# Patient Record
Sex: FEMALE | Race: BLACK OR AFRICAN AMERICAN | Employment: OTHER | URBAN - METROPOLITAN AREA
[De-identification: names, ages, dates, MRNs, and addresses within clinical notes are randomized per-mention and may not be internally consistent; named-entity substitution may affect disease eponyms.]

---

## 2017-02-27 ENCOUNTER — HOSPITAL ENCOUNTER (OUTPATIENT)
Dept: WOMENS IMAGING | Age: 82
Discharge: HOME | End: 2017-02-27
Payer: MEDICARE

## 2017-02-27 DIAGNOSIS — Z13.9 SCREENING: ICD-10-CM

## 2017-02-27 PROCEDURE — G0202 SCR MAMMO BI INCL CAD: HCPCS

## 2017-03-07 ENCOUNTER — HOSPITAL ENCOUNTER (OUTPATIENT)
Dept: CARDIOLOGY | Age: 82
Discharge: HOME | End: 2017-03-07
Payer: MEDICARE

## 2017-03-07 PROCEDURE — 93293 PM PHONE R-STRIP DEVICE EVAL: CPT

## 2017-06-06 ENCOUNTER — HOSPITAL ENCOUNTER (OUTPATIENT)
Dept: CARDIOLOGY | Age: 82
Discharge: HOME | End: 2017-06-06
Payer: MEDICARE

## 2017-06-06 PROCEDURE — 93293 PM PHONE R-STRIP DEVICE EVAL: CPT

## 2017-06-08 ENCOUNTER — OFFICE VISIT (OUTPATIENT)
Dept: CARDIOLOGY | Age: 82
End: 2017-06-08

## 2017-06-08 VITALS
SYSTOLIC BLOOD PRESSURE: 154 MMHG | HEART RATE: 60 BPM | HEIGHT: 62 IN | WEIGHT: 136.5 LBS | BODY MASS INDEX: 25.12 KG/M2 | DIASTOLIC BLOOD PRESSURE: 72 MMHG | TEMPERATURE: 98.7 F | RESPIRATION RATE: 18 BRPM | OXYGEN SATURATION: 98 %

## 2017-06-08 DIAGNOSIS — E78.00 PURE HYPERCHOLESTEROLEMIA: ICD-10-CM

## 2017-06-08 DIAGNOSIS — E03.9 HYPOTHYROIDISM, UNSPECIFIED TYPE: ICD-10-CM

## 2017-06-08 DIAGNOSIS — I25.10 CORONARY ARTERY DISEASE INVOLVING NATIVE HEART WITHOUT ANGINA PECTORIS, UNSPECIFIED VESSEL OR LESION TYPE: ICD-10-CM

## 2017-06-08 DIAGNOSIS — Z95.5 HISTORY OF CORONARY ARTERY STENT PLACEMENT: ICD-10-CM

## 2017-06-08 DIAGNOSIS — I10 ESSENTIAL HYPERTENSION: Primary | Chronic | ICD-10-CM

## 2017-06-08 PROCEDURE — 99214 OFFICE O/P EST MOD 30 MIN: CPT | Performed by: INTERNAL MEDICINE

## 2017-06-08 PROCEDURE — 93000 ELECTROCARDIOGRAM COMPLETE: CPT | Performed by: INTERNAL MEDICINE

## 2017-08-07 ENCOUNTER — HOSPITAL ENCOUNTER (INPATIENT)
Age: 82
LOS: 1 days | Discharge: HOME | DRG: 259 | End: 2017-08-09
Attending: INTERNAL MEDICINE | Admitting: INTERNAL MEDICINE
Payer: MEDICARE

## 2017-08-07 ENCOUNTER — APPOINTMENT (OUTPATIENT)
Dept: GENERAL RADIOLOGY | Age: 82
DRG: 259 | End: 2017-08-07
Payer: MEDICARE

## 2017-08-07 ENCOUNTER — APPOINTMENT (OUTPATIENT)
Dept: CT IMAGING | Age: 82
DRG: 259 | End: 2017-08-07
Payer: MEDICARE

## 2017-08-07 DIAGNOSIS — R55 SYNCOPE AND COLLAPSE: Primary | ICD-10-CM

## 2017-08-07 DIAGNOSIS — E87.1 HYPONATREMIA: ICD-10-CM

## 2017-08-07 LAB
ALBUMIN SERPL-MCNC: 3.9 G/DL (ref 3.9–4.9)
ALP BLD-CCNC: 53 U/L (ref 40–130)
ALT SERPL-CCNC: 16 U/L (ref 0–33)
ANION GAP SERPL CALCULATED.3IONS-SCNC: 12 MEQ/L (ref 7–13)
AST SERPL-CCNC: 28 U/L (ref 0–35)
BACTERIA: ABNORMAL /HPF
BASOPHILS ABSOLUTE: 0 K/UL (ref 0–0.2)
BASOPHILS RELATIVE PERCENT: 1.1 %
BILIRUB SERPL-MCNC: 0.4 MG/DL (ref 0–1.2)
BILIRUBIN URINE: NEGATIVE
BLOOD, URINE: NEGATIVE
BUN BLDV-MCNC: 30 MG/DL (ref 8–23)
CALCIUM SERPL-MCNC: 8.5 MG/DL (ref 8.6–10.2)
CHLORIDE BLD-SCNC: 94 MEQ/L (ref 98–107)
CK MB: 4.1 NG/ML (ref 0–3.8)
CLARITY: CLEAR
CO2: 25 MEQ/L (ref 22–29)
COLOR: YELLOW
CREAT SERPL-MCNC: 1.44 MG/DL (ref 0.5–0.9)
CREATINE KINASE-MB INDEX: 2 % (ref 0–3.5)
EOSINOPHILS ABSOLUTE: 0.2 K/UL (ref 0–0.7)
EOSINOPHILS RELATIVE PERCENT: 3.6 %
GFR AFRICAN AMERICAN: 41.9
GFR NON-AFRICAN AMERICAN: 34.6
GLOBULIN: 3.3 G/DL (ref 2.3–3.5)
GLUCOSE BLD-MCNC: 96 MG/DL (ref 74–109)
GLUCOSE URINE: NEGATIVE MG/DL
HCT VFR BLD CALC: 37 % (ref 37–47)
HEMOGLOBIN: 12.4 G/DL (ref 12–16)
KETONES, URINE: NEGATIVE MG/DL
LACTIC ACID: 1.2 MMOL/L (ref 0.5–2.2)
LEUKOCYTE ESTERASE, URINE: ABNORMAL
LYMPHOCYTES ABSOLUTE: 1.6 K/UL (ref 1–4.8)
LYMPHOCYTES RELATIVE PERCENT: 36.9 %
MAGNESIUM: 2.3 MG/DL (ref 1.7–2.3)
MCH RBC QN AUTO: 30.9 PG (ref 27–31.3)
MCHC RBC AUTO-ENTMCNC: 33.5 % (ref 33–37)
MCV RBC AUTO: 92.1 FL (ref 82–100)
MONOCYTES ABSOLUTE: 0.6 K/UL (ref 0.2–0.8)
MONOCYTES RELATIVE PERCENT: 15.3 %
NEUTROPHILS ABSOLUTE: 1.8 K/UL (ref 1.4–6.5)
NEUTROPHILS RELATIVE PERCENT: 43.1 %
NITRITE, URINE: NEGATIVE
PDW BLD-RTO: 15.4 % (ref 11.5–14.5)
PH UA: 7 (ref 5–9)
PLATELET # BLD: 146 K/UL (ref 130–400)
POTASSIUM SERPL-SCNC: 4.1 MEQ/L (ref 3.5–5.1)
PRO-BNP: 4337 PG/ML
PROTEIN UA: NEGATIVE MG/DL
RBC # BLD: 4.02 M/UL (ref 4.2–5.4)
RBC UA: ABNORMAL /HPF (ref 0–2)
SODIUM BLD-SCNC: 131 MEQ/L (ref 132–144)
SPECIFIC GRAVITY UA: 1.01 (ref 1–1.03)
TOTAL CK: 209 U/L (ref 0–170)
TOTAL PROTEIN: 7.2 G/DL (ref 6.4–8.1)
TROPONIN: <0.01 NG/ML (ref 0–0.01)
TROPONIN: <0.01 NG/ML (ref 0–0.01)
URINE REFLEX TO CULTURE: YES
UROBILINOGEN, URINE: 0.2 E.U./DL
WBC # BLD: 4.2 K/UL (ref 4.8–10.8)
WBC UA: ABNORMAL /HPF (ref 0–5)

## 2017-08-07 PROCEDURE — 2580000003 HC RX 258: Performed by: PHYSICIAN ASSISTANT

## 2017-08-07 PROCEDURE — 82553 CREATINE MB FRACTION: CPT

## 2017-08-07 PROCEDURE — 83605 ASSAY OF LACTIC ACID: CPT

## 2017-08-07 PROCEDURE — 83880 ASSAY OF NATRIURETIC PEPTIDE: CPT

## 2017-08-07 PROCEDURE — 70450 CT HEAD/BRAIN W/O DYE: CPT

## 2017-08-07 PROCEDURE — G0378 HOSPITAL OBSERVATION PER HR: HCPCS

## 2017-08-07 PROCEDURE — 99285 EMERGENCY DEPT VISIT HI MDM: CPT

## 2017-08-07 PROCEDURE — 85025 COMPLETE CBC W/AUTO DIFF WBC: CPT

## 2017-08-07 PROCEDURE — 87086 URINE CULTURE/COLONY COUNT: CPT

## 2017-08-07 PROCEDURE — 80053 COMPREHEN METABOLIC PANEL: CPT

## 2017-08-07 PROCEDURE — 99222 1ST HOSP IP/OBS MODERATE 55: CPT | Performed by: INTERNAL MEDICINE

## 2017-08-07 PROCEDURE — 82550 ASSAY OF CK (CPK): CPT

## 2017-08-07 PROCEDURE — 84484 ASSAY OF TROPONIN QUANT: CPT

## 2017-08-07 PROCEDURE — 71010 XR CHEST PORTABLE: CPT

## 2017-08-07 PROCEDURE — 6370000000 HC RX 637 (ALT 250 FOR IP): Performed by: PHYSICIAN ASSISTANT

## 2017-08-07 PROCEDURE — 93005 ELECTROCARDIOGRAM TRACING: CPT

## 2017-08-07 PROCEDURE — 81001 URINALYSIS AUTO W/SCOPE: CPT

## 2017-08-07 PROCEDURE — 36415 COLL VENOUS BLD VENIPUNCTURE: CPT

## 2017-08-07 PROCEDURE — 83735 ASSAY OF MAGNESIUM: CPT

## 2017-08-07 RX ORDER — SODIUM CHLORIDE 0.9 % (FLUSH) 0.9 %
10 SYRINGE (ML) INJECTION PRN
Status: DISCONTINUED | OUTPATIENT
Start: 2017-08-07 | End: 2017-08-09 | Stop reason: HOSPADM

## 2017-08-07 RX ORDER — SODIUM CHLORIDE 0.9 % (FLUSH) 0.9 %
10 SYRINGE (ML) INJECTION PRN
Status: DISCONTINUED | OUTPATIENT
Start: 2017-08-07 | End: 2017-08-07

## 2017-08-07 RX ORDER — NITROGLYCERIN 0.4 MG/1
TABLET SUBLINGUAL
Qty: 25 TABLET | Refills: 11 | Status: SHIPPED | OUTPATIENT
Start: 2017-08-07 | End: 2022-01-03 | Stop reason: SDUPTHER

## 2017-08-07 RX ORDER — CLOPIDOGREL BISULFATE 75 MG/1
75 TABLET ORAL DAILY
Status: DISCONTINUED | OUTPATIENT
Start: 2017-08-07 | End: 2017-08-09 | Stop reason: HOSPADM

## 2017-08-07 RX ORDER — LORAZEPAM 1 MG/1
1 TABLET ORAL EVERY 6 HOURS PRN
Status: DISCONTINUED | OUTPATIENT
Start: 2017-08-07 | End: 2017-08-09 | Stop reason: HOSPADM

## 2017-08-07 RX ORDER — ONDANSETRON 2 MG/ML
4 INJECTION INTRAMUSCULAR; INTRAVENOUS EVERY 6 HOURS PRN
Status: DISCONTINUED | OUTPATIENT
Start: 2017-08-07 | End: 2017-08-09 | Stop reason: HOSPADM

## 2017-08-07 RX ORDER — ONDANSETRON 2 MG/ML
4 INJECTION INTRAMUSCULAR; INTRAVENOUS EVERY 6 HOURS PRN
Status: DISCONTINUED | OUTPATIENT
Start: 2017-08-07 | End: 2017-08-07

## 2017-08-07 RX ORDER — SODIUM CHLORIDE 0.9 % (FLUSH) 0.9 %
10 SYRINGE (ML) INJECTION EVERY 12 HOURS SCHEDULED
Status: DISCONTINUED | OUTPATIENT
Start: 2017-08-07 | End: 2017-08-07

## 2017-08-07 RX ORDER — ASPIRIN 81 MG/1
81 TABLET, CHEWABLE ORAL DAILY
Status: DISCONTINUED | OUTPATIENT
Start: 2017-08-07 | End: 2017-08-07 | Stop reason: SDUPTHER

## 2017-08-07 RX ORDER — ACETAMINOPHEN 325 MG/1
650 TABLET ORAL EVERY 4 HOURS PRN
Status: DISCONTINUED | OUTPATIENT
Start: 2017-08-07 | End: 2017-08-09 | Stop reason: HOSPADM

## 2017-08-07 RX ORDER — AMLODIPINE BESYLATE 2.5 MG/1
2.5 TABLET ORAL DAILY
Status: DISCONTINUED | OUTPATIENT
Start: 2017-08-07 | End: 2017-08-09 | Stop reason: HOSPADM

## 2017-08-07 RX ORDER — CLOPIDOGREL BISULFATE 75 MG/1
75 TABLET ORAL DAILY
Status: DISCONTINUED | OUTPATIENT
Start: 2017-08-07 | End: 2017-08-07

## 2017-08-07 RX ORDER — MORPHINE SULFATE 4 MG/ML
4 INJECTION, SOLUTION INTRAMUSCULAR; INTRAVENOUS
Status: DISCONTINUED | OUTPATIENT
Start: 2017-08-07 | End: 2017-08-07

## 2017-08-07 RX ORDER — ASPIRIN 81 MG/1
81 TABLET, CHEWABLE ORAL DAILY
Status: DISCONTINUED | OUTPATIENT
Start: 2017-08-08 | End: 2017-08-09 | Stop reason: HOSPADM

## 2017-08-07 RX ORDER — MORPHINE SULFATE 4 MG/ML
2 INJECTION, SOLUTION INTRAMUSCULAR; INTRAVENOUS
Status: DISCONTINUED | OUTPATIENT
Start: 2017-08-07 | End: 2017-08-09 | Stop reason: HOSPADM

## 2017-08-07 RX ORDER — SODIUM CHLORIDE 0.9 % (FLUSH) 0.9 %
10 SYRINGE (ML) INJECTION EVERY 12 HOURS SCHEDULED
Status: DISCONTINUED | OUTPATIENT
Start: 2017-08-07 | End: 2017-08-09 | Stop reason: HOSPADM

## 2017-08-07 RX ORDER — LISINOPRIL 20 MG/1
20 TABLET ORAL DAILY
Status: DISCONTINUED | OUTPATIENT
Start: 2017-08-07 | End: 2017-08-09 | Stop reason: HOSPADM

## 2017-08-07 RX ORDER — MORPHINE SULFATE 4 MG/ML
4 INJECTION, SOLUTION INTRAMUSCULAR; INTRAVENOUS
Status: DISCONTINUED | OUTPATIENT
Start: 2017-08-07 | End: 2017-08-09 | Stop reason: HOSPADM

## 2017-08-07 RX ORDER — NITROGLYCERIN 0.4 MG/1
0.4 TABLET SUBLINGUAL EVERY 5 MIN PRN
Status: DISCONTINUED | OUTPATIENT
Start: 2017-08-07 | End: 2017-08-07

## 2017-08-07 RX ORDER — ATENOLOL 50 MG/1
50 TABLET ORAL DAILY
Status: DISCONTINUED | OUTPATIENT
Start: 2017-08-07 | End: 2017-08-09 | Stop reason: HOSPADM

## 2017-08-07 RX ORDER — ACETAMINOPHEN 325 MG/1
650 TABLET ORAL EVERY 4 HOURS PRN
Status: DISCONTINUED | OUTPATIENT
Start: 2017-08-07 | End: 2017-08-07

## 2017-08-07 RX ORDER — 0.9 % SODIUM CHLORIDE 0.9 %
1000 INTRAVENOUS SOLUTION INTRAVENOUS ONCE
Status: COMPLETED | OUTPATIENT
Start: 2017-08-07 | End: 2017-08-07

## 2017-08-07 RX ORDER — LEVOTHYROXINE SODIUM 0.05 MG/1
50 TABLET ORAL DAILY
Status: DISCONTINUED | OUTPATIENT
Start: 2017-08-07 | End: 2017-08-09 | Stop reason: HOSPADM

## 2017-08-07 RX ORDER — ISOSORBIDE MONONITRATE 30 MG/1
30 TABLET, EXTENDED RELEASE ORAL DAILY
Status: DISCONTINUED | OUTPATIENT
Start: 2017-08-07 | End: 2017-08-09 | Stop reason: HOSPADM

## 2017-08-07 RX ORDER — NITROGLYCERIN 0.4 MG/1
0.4 TABLET SUBLINGUAL EVERY 5 MIN PRN
Status: DISCONTINUED | OUTPATIENT
Start: 2017-08-07 | End: 2017-08-09 | Stop reason: HOSPADM

## 2017-08-07 RX ADMIN — LORAZEPAM 1 MG: 1 TABLET ORAL at 18:54

## 2017-08-07 RX ADMIN — ATENOLOL 50 MG: 50 TABLET ORAL at 20:06

## 2017-08-07 RX ADMIN — SODIUM CHLORIDE 1000 ML: 9 INJECTION, SOLUTION INTRAVENOUS at 15:43

## 2017-08-07 RX ADMIN — SODIUM CHLORIDE, PRESERVATIVE FREE 10 ML: 5 INJECTION INTRAVENOUS at 20:06

## 2017-08-07 ASSESSMENT — ENCOUNTER SYMPTOMS
GASTROINTESTINAL NEGATIVE: 1
APNEA: 0
STRIDOR: 0
WHEEZING: 0
BLOOD IN STOOL: 0
ALLERGIC/IMMUNOLOGIC NEGATIVE: 1
EYES NEGATIVE: 1
COUGH: 0
NAUSEA: 0
VOMITING: 0
SHORTNESS OF BREATH: 1
TROUBLE SWALLOWING: 0
SHORTNESS OF BREATH: 0
DIARRHEA: 0
CHEST TIGHTNESS: 0
COLOR CHANGE: 0
EYE PAIN: 0
RESPIRATORY NEGATIVE: 1

## 2017-08-07 NOTE — CARE COORDINATION
PAGED DAVID FIORE @ 3816 REGARDING WHETHER NEURO CHECKS WOULD BE CONSIDERED. NO RESPONSE TO PAGE AS OF 1730.

## 2017-08-07 NOTE — H&P
Negative for blood in stool and nausea. Genitourinary: Negative. Musculoskeletal: Negative. Skin: Negative. Neurological: Positive for syncope. Negative for dizziness, weakness and light-headedness. Hematological: Negative. Psychiatric/Behavioral: Negative. Physical Examination:    BP (!) 141/95  Pulse 65  Ht 5' 4\" (1.626 m)  Wt 135 lb (61.2 kg)  SpO2 97%  BMI 23.17 kg/m2   Physical Exam   Constitutional: She appears healthy. No distress. HENT:   Normal cephalic and Atraumatic   Eyes: Pupils are equal, round, and reactive to light. Neck: Normal range of motion and thyroid normal. Neck supple. No JVD present. No adenopathy. No thyromegaly present. Cardiovascular: Normal rate, regular rhythm, intact distal pulses and normal pulses. Murmur heard. Pulmonary/Chest: Effort normal and breath sounds normal. She has no wheezes. She has no rales. She exhibits no tenderness. Abdominal: Soft. Bowel sounds are normal.   Musculoskeletal: Normal range of motion. She exhibits no edema or tenderness. Neurological: She is alert and oriented to person, place, and time. Skin: Skin is warm. No cyanosis. Nails show no clubbing.          LABS:  CBC:   Lab Results   Component Value Date    WBC 4.2 08/07/2017    RBC 4.02 08/07/2017    HGB 12.4 08/07/2017    HCT 37.0 08/07/2017    MCV 92.1 08/07/2017    MCH 30.9 08/07/2017    MCHC 33.5 08/07/2017    RDW 15.4 08/07/2017     08/07/2017    MPV 9.1 07/18/2014     CBC with Differential:    Lab Results   Component Value Date    WBC 4.2 08/07/2017    RBC 4.02 08/07/2017    HGB 12.4 08/07/2017    HCT 37.0 08/07/2017     08/07/2017    MCV 92.1 08/07/2017    MCH 30.9 08/07/2017    MCHC 33.5 08/07/2017    RDW 15.4 08/07/2017    LYMPHOPCT 36.9 08/07/2017    MONOPCT 15.3 08/07/2017    BASOPCT 1.1 08/07/2017    MONOSABS 0.6 08/07/2017    LYMPHSABS 1.6 08/07/2017    EOSABS 0.2 08/07/2017    BASOSABS 0.0 08/07/2017     CMP:    Lab Results

## 2017-08-07 NOTE — IP AVS SNAPSHOT
Patient Information     Patient Name YENIFER Hopkins 1932      Care Provided at:     Name Address Phone       [address] [facility] [address] [facility]  [place] [place] [address] [phone]            Your Visit    Here you will find information about your visit, including the reason for your visit. Please take this sheet with you when you visit your doctor or other health care provider in the future. It will help determine the best possible medical care for you at that time. If you have any questions once you leave the hospital, please call the department phone number listed below. Why you were here     Your primary diagnosis was:  Not on File    Your diagnoses also included:  Syncope And Collapse      Visit Information     Date & Time Provider Department Dept. Phone    2017 Sugey Whitt MD [address] Telemetry [phone]       Follow-up Appointments    Below is a list of your follow-up and future appointments. This may not be a complete list as you may have made appointments directly with providers that we are not aware of or your providers may have made some for you. Please call your providers to confirm appointments. It is important to keep your appointments. Please bring your current insurance card, photo ID, co-pay, and all medication bottles to your appointment. If self-pay, payment is expected at the time of service. Follow-up Information     Schedule an appointment as soon as possible for a visit with Mac Kim MD.    Specialty:  Cardiology    Why:  Hospital discharge / Post pacemaker generator change , For wound check    Contact information:    [address] [facility], [address] [facility] [id number] [phone]        Future Appointments     2017 10:30 AM     Appointment with Mac Kim MD at [facility] Cardiology ([phone])   Please arrive 15 minutes prior to appointment, bring photo ID and insurance card.    [address] [facility], [address] [facility] · If your doctor recommends it, limit the amount of water you drink. And limit fluids that are mostly water. These include tea, coffee, and juice. · Take your medicines exactly as prescribed. Call your doctor if you have any problems with your medicine. · Get your sodium levels tested when your doctor tells you to. When should you call for help? Call 911 anytime you think you may need emergency care. For example, call if:  · You have a seizure. · You passed out (lost consciousness). Call your doctor now or seek immediate medical care if:  · You are confused or it is hard to focus. · You have little or no appetite. · You feel sick to your stomach or you vomit. · You have a headache. · You have mood changes. · You feel more tired than usual.  Watch closely for changes in your health, and be sure to contact your doctor if:  · You do not get better as expected. Where can you learn more? Go to https://[place].Tigermed. org and sign in to your Qiniu account. Enter U819 in the EyeIC box to learn more about \"Hyponatremia: Care Instructions. \"     If you do not have an account, please click on the \"Sign Up Now\" link. Current as of: October 14, 2016  Content Version: 11.2  © 4834-3873 [employer], [employer]. Care instructions adapted under license by [facility] ([facility]). If you have questions about a medical condition or this instruction, always ask your healthcare professional. [place] [address] any warranty or liability for your use of this information. Electrolyte Imbalance: Care Instructions  Your Care Instructions  Electrolytes are minerals in your blood. They include sodium, potassium, calcium, and magnesium. When they are not at the right levels, you can feel very ill. You may not know what is causing it, but you know something is wrong. You may feel weak or numb, have muscle spasms, or twitch.  Your of the medicines you take. How can you care for yourself at home? · Drink plenty of fluids, enough so that your urine is light yellow or clear like water. If you have kidney, heart, or liver disease and have to limit fluids, talk with your doctor before you increase the amount of fluids you drink. · Include high-fiber foods in your diet each day. These include fruits, vegetables, beans, and whole grains. · Get at least 30 minutes of exercise on most days of the week. Walking is a good choice. You also may want to do other activities, such as running, swimming, cycling, or playing tennis or team sports. · Take a fiber supplement, such as Citrucel or Metamucil, every day. Read and follow all instructions on the label. · Schedule time each day for a bowel movement. A daily routine may help. Take your time having your bowel movement. · Support your feet with a small step stool when you sit on the toilet. This helps flex your hips and places your pelvis in a squatting position. · Your doctor may recommend an over-the-counter laxative to relieve your constipation. Examples are Milk of Magnesia and MiraLax. Read and follow all instructions on the label. Do not use laxatives on a long-term basis. When should you call for help? Call your doctor now or seek immediate medical care if:  · You have new or worse belly pain. · You have new or worse nausea or vomiting. · You have blood in your stools. Watch closely for changes in your health, and be sure to contact your doctor if:  · Your constipation is getting worse. · You do not get better as expected. Where can you learn more? Go to https://bandar.WePay. org and sign in to your CLASEMOVIL account. Enter 21 [phone] in the Peoplematics box to learn more about \"Constipation: Care Instructions. \"     If you do not have an account, please click on the \"Sign Up Now\" link.   Current as of: May 27, 2016  Content Version: 11.2 activity you think might cause chest pain. Follow your doctor's instructions. Do not crush, chew, break, or open an extended-release capsule. Swallow it whole. The nitroglycerin sublingual tablet should be placed under your tongue and allowed to dissolve slowly. Do not chew or swallow it. You may use additional tablets every 5 minutes, but not more than 3 tablets in 15 minutes. If you use nitroglycerin sublingual spray  to treat an angina attack: At the first sign of an attack, apply the spray directly on or under your tongue. Close your mouth after each spray. Do not inhale the spray. Do not shake the spray before or during use. You may use additional sprays every 5 minutes, but not more than 3 sprays in 15 minutes. Seek emergency medical attention if your chest pain gets worse or lasts more than 5 minutes, especially if you have trouble breathing or feel weak, dizzy, or nauseated, or lightheaded. You may feel a slight burning or stinging in your mouth when you use this medicine. However, this sensation is not a sign of how well the medication is working. Do not use more medication just because you do not feel a burning or stinging. This medicine can cause unusual results with certain medical tests. Tell any doctor who treats you that you are using nitroglycerin. If you take nitroglycerin on a regular schedule to prevent angina, do not stop taking it suddenly or you could have a severe attack of angina. Keep this medicine on hand at all times in case of an angina attack. Get your prescription refilled before you run out of medicine completely. Store the tablets  in the glass container at room temperature, away from moisture and heat. Keep the bottle tightly closed when not in use. Keep the spray away from open flame or high heat, such as in a car on a hot day. The canister may explode if it gets too hot. What happens if I miss a dose?   Since nitroglycerin is taken as needed, you may not be on a dosing · pounding heartbeats or fluttering in your chest;  · slow heart rate;  · blurred vision or dry mouth; or  · a light-headed feeling, like you might pass out. Common side effects may include:  · mild burning or tingling with the tablet in your mouth;  · headache;  · dizziness, spinning sensation;  · nausea, vomiting;  · flushing (warmth, redness, or tingly feeling);  · pale skin, increased sweating; or  · feeling weak or dizzy. This is not a complete list of side effects and others may occur. Call your doctor for medical advice about side effects. You may report side effects to FDA at 4-139-MXG-1831. What other drugs will affect nitroglycerin? Many drugs can interact with nitroglycerin. Not all possible interactions are listed here. Tell your doctor about all your medications and any you start or stop using during treatment with nitroglycerin, especially:  · aspirin or heparin;  · a diuretic or \"water pill\";  · medicine to treat depression or mental illness; or  · ergot medicine to treat migraine headache, such as dihydroergotamine, ergotamine, ergonovine, or methylergonovine. This list is not complete and many other drugs can interact with nitroglycerin. This includes prescription and over-the-counter medicines, vitamins, and herbal products. Give a list of all your medicines to any healthcare provider who treats you. Where can I get more information? Your pharmacist can provide more information about nitroglycerin. Remember, keep this and all other medicines out of the reach of children, never share your medicines with others, and use this medication only for the indication prescribed. Every effort has been made to ensure that the information provided by [address] is accurate, up-to-date, and complete, but no guarantee is made to that effect. Drug information contained herein may be time sensitive.  [facility] information has been compiled for use by prevent or reduce dizziness, fainting, and shortness of breath caused by a slow or unsteady heartbeat. Your chest may be sore where the doctor made the cut (incision) and put in the pacemaker. You also may have a bruise and mild swelling. These symptoms usually get better in 1 to 2 weeks. You may feel a hard ridge along the incision. This usually gets softer in the months after surgery. You may be able to see or feel the outline of the pacemaker under your skin. You will probably be able to go back to work or your usual routine 1 to 2 weeks after surgery. Pacemaker batteries usually last 5 to 15 years. Your doctor will talk to you about how often you will need to have your pacemaker checked. You can safely use most household and office electronics such as kitchen appliances, electric power tools, and computers. You will need to stay away from things with strong magnetic and electrical fields such as an MRI machine (unless your pacemaker is safe for an MRI), welding equipment, and power generators. You can use a cell phone, but keep it at least 6 inches away from your pacemaker. Check with your doctor about what you need to stay away from, what you need to use with care, and what is okay to use. This care sheet gives you a general idea about how long it will take for you to recover. But each person recovers at a different pace. Follow the steps below to get better as quickly as possible. How can you care for yourself at home? Activity  · Rest when you feel tired. · Be active. Walking is a good choice. · For 4 to 6 weeks:  ¨ Avoid activities that strain your chest or upper arm muscles. This includes pushing a  or vacuum, mopping floors, swimming, or swinging a golf club or tennis racquet. ¨ Do not raise your arm (the one on the side of your body where the pacemaker is located) above your shoulder. ¨ Allow your body to heal. Don't move quickly or lift anything heavy until you are feeling better. · Keep a medical ID card with you at all times that says you have a pacemaker. The card should include the  and model information. · Wear medical alert jewelry stating that you have a pacemaker. You can buy this at most drugstores. · Check your pulse as directed by your doctor. · Have your pacemaker checked as often as your doctor recommends. In some cases, this may be done over the phone or the Internet. Your doctor will give you instructions about how to do this. Follow-up care is a key part of your treatment and safety. Be sure to make and go to all appointments, and call your doctor if you are having problems. It's also a good idea to know your test results and keep a list of the medicines you take. When should you call for help? Call 911 anytime you think you may need emergency care. For example, call if:  · You passed out (lost consciousness). · You have severe trouble breathing. · You have sudden chest pain and shortness of breath, or you cough up blood. · You have symptoms of a heart attack. These may include:  ¨ Chest pain or pressure, or a strange feeling in the chest.  ¨ Sweating. ¨ Shortness of breath. ¨ Nausea or vomiting. ¨ Pain, pressure, or a strange feeling in the back, neck, jaw, or upper belly or in one or both shoulders or arms. ¨ Lightheadedness or sudden weakness. ¨ A fast or irregular heartbeat. After you call 911, the  may tell you to chew 1 adult-strength or 2 to 4 low-dose aspirin. Wait for an ambulance. Do not try to drive yourself. · You have symptoms of a stroke. These may include:  ¨ Sudden numbness, tingling, weakness, or loss of movement in your face, arm, or leg, especially on only one side of your body. ¨ Sudden vision changes. ¨ Sudden trouble speaking. ¨ Sudden confusion or trouble understanding simple statements. ¨ Sudden problems with walking or balance. ¨ A sudden, severe headache that is different from past headaches.

## 2017-08-07 NOTE — Clinical Note
Patient Class: Observation [104]   REQUIRED: Diagnosis: Syncope and collapse [780. 2. ICD-9-CM]   Estimated Length of Stay: Estimated stay of less than 2 midnights   Future Attending Provider: Rosario Goodwin [[id number]]   Telemetry Bed Required?: Yes

## 2017-08-07 NOTE — ED PROVIDER NOTES
[address] [facility] ED  eMERGENCY dEPARTMENT eNCOUnter      Pt Name: Zuly Kendrick  MRN: [id number]  [place] 7/8/1932  Date of evaluation: 8/7/2017  Provider: Ruthie Estevez PA-C    CHIEF COMPLAINT       Chief Complaint   Patient presents with    Dizziness    Shortness of Breath    Loss of Consciousness    Abdominal Pain         HISTORY OF PRESENT ILLNESS   (Location/Symptom, Timing/Onset, Context/Setting, Quality, Duration, Modifying Factors, Severity)  Note limiting factors. Zuly Kendrick is a 80 y.o. female who presents to the emergency department following a syncopal episode earlier today. Patient does state some very minor chest pain association with the symptoms. Patient states that she has intermittent episodes of dizziness but none currently. Patient she has chronic constipation and does complain of very mild abdominal pain but denies any new or changing to this pain. Patient states that she is only short of breath and has been feeling short of breath for the past several days. Patient denies any cough or chest congestion association with the symptoms. Patient denies any palpitations. Patient does state that she has a history of passing out but one regularly reviewed she had a pacemaker and stent done following her syncopal episode in 2007. Patient denies any headaches, loss or change of vision or hearing. HPI    Nursing Notes were reviewed. REVIEW OF SYSTEMS    (2-9 systems for level 4, 10 or more for level 5)     Review of Systems   Constitutional: Negative for diaphoresis and fever. HENT: Negative for hearing loss and trouble swallowing. Eyes: Negative for pain. Respiratory: Positive for shortness of breath. Negative for apnea. Cardiovascular: Positive for chest pain. Negative for palpitations and leg swelling. Gastrointestinal: Negative for diarrhea and vomiting. Endocrine: Negative. Genitourinary: Negative for hematuria.    Musculoskeletal: Negative for neck pain and neck stiffness. Skin: Negative for color change. Allergic/Immunologic: Negative. Neurological: Positive for dizziness and syncope. Negative for numbness. Hematological: Negative. Psychiatric/Behavioral: Negative. All other systems reviewed and are negative. Except as noted above the remainder of the review of systems was reviewed and negative. PAST MEDICAL HISTORY     Past Medical History:   Diagnosis Date    Anxiety     Depression     Heart disease     Hypertension     Hypothyroidism          SURGICAL HISTORY       Past Surgical History:   Procedure Laterality Date    BREAST CYST ASPIRATION Right 1/17/14    U/S guided core bx of the right breast    HYSTERECTOMY      PACEMAKER INSERTION           CURRENT MEDICATIONS       Previous Medications    AMLODIPINE (NORVASC) 2.5 MG TABLET    Take 2.5 mg by mouth daily. ASPIRIN 81 MG TABLET    Take 81 mg by mouth    ATENOLOL (TENORMIN) 50 MG TABLET    Take 50 mg by mouth daily. CLOPIDOGREL (PLAVIX) 75 MG TABLET    Take 75 mg by mouth daily. ISOSORBIDE MONONITRATE (IMDUR) 30 MG CR TABLET    Take 30 mg by mouth daily. LEVOTHYROXINE (SYNTHROID) 50 MCG TABLET    Take 50 mcg by mouth Daily. LISINOPRIL (PRINIVIL;ZESTRIL) 20 MG TABLET    Take 20 mg by mouth daily. LORAZEPAM (ATIVAN) 1 MG TABLET    Take 1 mg by mouth every 6 hours as needed. MULTIPLE VITAMINS-MINERALS (CENTRUM SILVER ADULT 50+ PO)    Take 1 tablet by mouth    MULTIPLE VITAMINS-MINERALS (CENTRUM SILVER) TABS    Take  by mouth daily. NITROGLYCERIN (NITROSTAT) 0.4 MG SL TABLET    Place 0.4 mg under the tongue every 5 minutes as needed. SERTRALINE (ZOLOFT) 50 MG TABLET    Take 50 mg by mouth daily. TRIAMTERENE (DYRENIUM) 50 MG CAPSULE    Take 50 mg by mouth       ALLERGIES     Sulfa antibiotics    FAMILY HISTORY     History reviewed. No pertinent family history. SOCIAL HISTORY       Social History     Social History    Marital status:   Ultrasound and MRI are read by the radiologist. Plain radiographic images are visualized and preliminarily interpreted by the emergency physician with the below findings:    NEG    Interpretation per the Radiologist below, if available at the time of this note:    CT Head WO Contrast   Final Result      1. No appreciable acute intracranial abnormality. Note: An acute ischemic event or extension of chronic ischemic process may not be initially evident on CT. Recommend further evaluation with MRI as clinically indicated. 2. Interval progression of chronic small vessel ischemic disease. 3. Opacification of an ethmoid air cell. 4. Global atrophy and atherosclerosis. XR Chest Portable   Final Result   No acute pulmonary parenchymal abnormality. ED BEDSIDE ULTRASOUND:   Performed by ED Physician - none    LABS:  Labs Reviewed   CBC WITH AUTO DIFFERENTIAL - Abnormal; Notable for the following:        Result Value    WBC 4.2 (*)     RBC 4.02 (*)     RDW 15.4 (*)     All other components within normal limits   COMPREHENSIVE METABOLIC PANEL - Abnormal; Notable for the following:     Sodium 131 (*)     Chloride 94 (*)     BUN 30 (*)     CREATININE 1.44 (*)     GFR Non- 34.6 (*)     GFR  41.9 (*)     Calcium 8.5 (*)     All other components within normal limits   CK - Abnormal; Notable for the following: Total  (*)     All other components within normal limits   MAGNESIUM   TROPONIN   LACTIC ACID, PLASMA   URINE RT REFLEX TO CULTURE   CKMB & RELATIVE PERCENT       All other labs were within normal range or not returned as of this dictation. EMERGENCY DEPARTMENT COURSE and DIFFERENTIAL DIAGNOSIS/MDM:   Vitals:    Vitals:    08/07/17 1432 08/07/17 1532   BP: (!) 147/94 (!) 141/95   Pulse: 65    SpO2: 97%    Weight: 135 lb (61.2 kg)    Height: 5' 4\" (1.626 m)        She presents to the emergency department following a syncopal episode.   Testing is grossly

## 2017-08-08 ENCOUNTER — APPOINTMENT (OUTPATIENT)
Dept: GENERAL RADIOLOGY | Age: 82
DRG: 259 | End: 2017-08-08
Payer: MEDICARE

## 2017-08-08 ENCOUNTER — APPOINTMENT (OUTPATIENT)
Dept: CARDIAC CATH/INVASIVE PROCEDURES | Age: 82
DRG: 259 | End: 2017-08-08
Payer: MEDICARE

## 2017-08-08 LAB
ANION GAP SERPL CALCULATED.3IONS-SCNC: 13 MEQ/L (ref 7–13)
BUN BLDV-MCNC: 25 MG/DL (ref 8–23)
CALCIUM SERPL-MCNC: 7.9 MG/DL (ref 8.6–10.2)
CHLORIDE BLD-SCNC: 95 MEQ/L (ref 98–107)
CHOLESTEROL, TOTAL: 148 MG/DL (ref 0–199)
CO2: 22 MEQ/L (ref 22–29)
CREAT SERPL-MCNC: 1.24 MG/DL (ref 0.5–0.9)
GFR AFRICAN AMERICAN: 49.7
GFR NON-AFRICAN AMERICAN: 41.1
GLUCOSE BLD-MCNC: 109 MG/DL (ref 74–109)
HCT VFR BLD CALC: 33.5 % (ref 37–47)
HDLC SERPL-MCNC: 60 MG/DL (ref 40–59)
HEMOGLOBIN: 11.3 G/DL (ref 12–16)
INR BLD: 1
LDL CHOLESTEROL CALCULATED: 72 MG/DL (ref 0–129)
LV EF: 63 %
LVEF MODALITY: NORMAL
MAGNESIUM: 2.4 MG/DL (ref 1.7–2.3)
MCH RBC QN AUTO: 30.6 PG (ref 27–31.3)
MCHC RBC AUTO-ENTMCNC: 33.6 % (ref 33–37)
MCV RBC AUTO: 91 FL (ref 82–100)
PDW BLD-RTO: 15 % (ref 11.5–14.5)
PLATELET # BLD: 143 K/UL (ref 130–400)
POTASSIUM SERPL-SCNC: 3.9 MEQ/L (ref 3.5–5.1)
PROTHROMBIN TIME: 10.6 SEC (ref 8.1–13.7)
RBC # BLD: 3.68 M/UL (ref 4.2–5.4)
SODIUM BLD-SCNC: 130 MEQ/L (ref 132–144)
TRIGL SERPL-MCNC: 82 MG/DL (ref 0–200)
TROPONIN: <0.01 NG/ML (ref 0–0.01)
WBC # BLD: 4.4 K/UL (ref 4.8–10.8)

## 2017-08-08 PROCEDURE — 80048 BASIC METABOLIC PNL TOTAL CA: CPT

## 2017-08-08 PROCEDURE — C1785 PMKR, DUAL, RATE-RESP: HCPCS

## 2017-08-08 PROCEDURE — 2500000003 HC RX 250 WO HCPCS

## 2017-08-08 PROCEDURE — G0378 HOSPITAL OBSERVATION PER HR: HCPCS

## 2017-08-08 PROCEDURE — 80061 LIPID PANEL: CPT

## 2017-08-08 PROCEDURE — 2580000003 HC RX 258

## 2017-08-08 PROCEDURE — 6370000000 HC RX 637 (ALT 250 FOR IP): Performed by: INTERNAL MEDICINE

## 2017-08-08 PROCEDURE — 36415 COLL VENOUS BLD VENIPUNCTURE: CPT

## 2017-08-08 PROCEDURE — 84484 ASSAY OF TROPONIN QUANT: CPT

## 2017-08-08 PROCEDURE — 99233 SBSQ HOSP IP/OBS HIGH 50: CPT | Performed by: INTERNAL MEDICINE

## 2017-08-08 PROCEDURE — 93280 PM DEVICE PROGR EVAL DUAL: CPT

## 2017-08-08 PROCEDURE — 6370000000 HC RX 637 (ALT 250 FOR IP): Performed by: PHYSICIAN ASSISTANT

## 2017-08-08 PROCEDURE — 85610 PROTHROMBIN TIME: CPT

## 2017-08-08 PROCEDURE — 85027 COMPLETE CBC AUTOMATED: CPT

## 2017-08-08 PROCEDURE — 6360000002 HC RX W HCPCS: Performed by: NURSE PRACTITIONER

## 2017-08-08 PROCEDURE — 93306 TTE W/DOPPLER COMPLETE: CPT

## 2017-08-08 PROCEDURE — 33228 REMV&REPLC PM GEN DUAL LEAD: CPT | Performed by: INTERNAL MEDICINE

## 2017-08-08 PROCEDURE — 71010 XR CHEST PORTABLE: CPT

## 2017-08-08 PROCEDURE — 83735 ASSAY OF MAGNESIUM: CPT

## 2017-08-08 PROCEDURE — 0JPT0PZ REMOVAL OF CARDIAC RHYTHM RELATED DEVICE FROM TRUNK SUBCUTANEOUS TISSUE AND FASCIA, OPEN APPROACH: ICD-10-PCS | Performed by: INTERNAL MEDICINE

## 2017-08-08 PROCEDURE — 0JH606Z INSERTION OF PACEMAKER, DUAL CHAMBER INTO CHEST SUBCUTANEOUS TISSUE AND FASCIA, OPEN APPROACH: ICD-10-PCS | Performed by: INTERNAL MEDICINE

## 2017-08-08 PROCEDURE — 6360000002 HC RX W HCPCS

## 2017-08-08 PROCEDURE — 93005 ELECTROCARDIOGRAM TRACING: CPT

## 2017-08-08 PROCEDURE — 2580000003 HC RX 258: Performed by: PHYSICIAN ASSISTANT

## 2017-08-08 PROCEDURE — 96366 THER/PROPH/DIAG IV INF ADDON: CPT

## 2017-08-08 PROCEDURE — 96365 THER/PROPH/DIAG IV INF INIT: CPT

## 2017-08-08 PROCEDURE — 2580000003 HC RX 258: Performed by: INTERNAL MEDICINE

## 2017-08-08 RX ORDER — SODIUM CHLORIDE 9 MG/ML
INJECTION, SOLUTION INTRAVENOUS CONTINUOUS
Status: DISCONTINUED | OUTPATIENT
Start: 2017-08-08 | End: 2017-08-08

## 2017-08-08 RX ORDER — SODIUM CHLORIDE 0.9 % (FLUSH) 0.9 %
10 SYRINGE (ML) INJECTION PRN
Status: DISCONTINUED | OUTPATIENT
Start: 2017-08-08 | End: 2017-08-08

## 2017-08-08 RX ORDER — SODIUM CHLORIDE 0.9 % (FLUSH) 0.9 %
10 SYRINGE (ML) INJECTION EVERY 12 HOURS SCHEDULED
Status: DISCONTINUED | OUTPATIENT
Start: 2017-08-08 | End: 2017-08-08

## 2017-08-08 RX ADMIN — ISOSORBIDE MONONITRATE 30 MG: 30 TABLET, EXTENDED RELEASE ORAL at 08:40

## 2017-08-08 RX ADMIN — LEVOTHYROXINE SODIUM 50 MCG: 50 TABLET ORAL at 06:21

## 2017-08-08 RX ADMIN — SODIUM CHLORIDE, PRESERVATIVE FREE 10 ML: 5 INJECTION INTRAVENOUS at 08:41

## 2017-08-08 RX ADMIN — CLOPIDOGREL BISULFATE 75 MG: 75 TABLET ORAL at 21:09

## 2017-08-08 RX ADMIN — SERTRALINE HYDROCHLORIDE 50 MG: 50 TABLET ORAL at 21:10

## 2017-08-08 RX ADMIN — CEFAZOLIN SODIUM 2 G: 2 SOLUTION INTRAVENOUS at 15:57

## 2017-08-08 RX ADMIN — MAGNESIUM HYDROXIDE 30 ML: 400 SUSPENSION ORAL at 03:58

## 2017-08-08 RX ADMIN — SODIUM CHLORIDE: 9 INJECTION, SOLUTION INTRAVENOUS at 15:56

## 2017-08-08 RX ADMIN — AMLODIPINE BESYLATE 2.5 MG: 2.5 TABLET ORAL at 08:41

## 2017-08-08 RX ADMIN — ATENOLOL 50 MG: 50 TABLET ORAL at 08:40

## 2017-08-08 RX ADMIN — LISINOPRIL 20 MG: 20 TABLET ORAL at 08:41

## 2017-08-08 ASSESSMENT — ENCOUNTER SYMPTOMS
BLOOD IN STOOL: 0
CHEST TIGHTNESS: 0
RESPIRATORY NEGATIVE: 1
SHORTNESS OF BREATH: 0
WHEEZING: 0
COUGH: 0
EYES NEGATIVE: 1
NAUSEA: 0
STRIDOR: 0
GASTROINTESTINAL NEGATIVE: 1

## 2017-08-08 NOTE — CARE COORDINATION
PATIENT FROM HOME  STATES DTR LIVES  NEXT DOOR AND VERY INVOLVED IN CARE  PATIENT  FOR  PACEMAKER CHANGE TODAY  D/C PLAN  ASSESS FOR HOME CARE NEED  POST  PACEMAKER  REPLACEMENT.

## 2017-08-08 NOTE — FLOWSHEET NOTE
Received pt back to [facility] via cart from post cath lab. S/P pacemaker generator change. Transferred to bed via maxislide. Alert / oriented. Denies pain or SOB. VS obtained per PCA. Left chest site covered aquacel dressing, dry and intact. No bleeding / swelling / hematoma noted at this time. Set up to eat dinner. Daughter in room with pt. Call light at hand.  Electronically signed by Massimo Medley RN on 8/8/2017 at 6:50 PM

## 2017-08-08 NOTE — PROCEDURES
Section of Cardiology  Adult Brief Pacemaker Procedure Note        Procedure(s):  Pacemaker generator replacement    Pre-operative Diagnosis:  XIN    H&P Status: Completed and reviewed. Post-operative Diagnosis:  Successful PPM placement generator replacement.      Findings: see full report    Complications:  none    Primary Proceduralist:   Dr. Cassia Rust       Full procedure note to follow

## 2017-08-08 NOTE — FLOWSHEET NOTE
RN assessment completed. Pt resting in bed. Alert / oriented. Denies pain or SOB. No distress. Reviewed plan of care with pt. Up in room, steady gait. Denies dizziness.  Electronically signed by Ashutosh Stewart RN on 8/8/2017 at 2:14 PM

## 2017-08-08 NOTE — PROGRESS NOTES
Arrived to pre/post cath from the cath lab. Left upper chest dressing dry and intact, no bleeding or hematoma.   Attached to monitor and  Patient resting
Chest x ray done and left upper chest dressing dry and intact with no bleeding or hematoma
Report called to [facility] on [facility]
Abdominal: Soft. Bowel sounds are normal. There is no tenderness. Musculoskeletal: Normal range of motion. She exhibits no edema or deformity. Neurological: She is alert and oriented to person, place, and time. Skin: Skin is warm and dry. No rash noted.    Psychiatric: Her behavior is normal.       LABS:  CBC:   Lab Results   Component Value Date    WBC 4.4 08/08/2017    RBC 3.68 08/08/2017    HGB 11.3 08/08/2017    HCT 33.5 08/08/2017    MCV 91.0 08/08/2017    MCH 30.6 08/08/2017    MCHC 33.6 08/08/2017    RDW 15.0 08/08/2017     08/08/2017    MPV 9.1 07/18/2014     CBC with Differential:    Lab Results   Component Value Date    WBC 4.4 08/08/2017    RBC 3.68 08/08/2017    HGB 11.3 08/08/2017    HCT 33.5 08/08/2017     08/08/2017    MCV 91.0 08/08/2017    MCH 30.6 08/08/2017    MCHC 33.6 08/08/2017    RDW 15.0 08/08/2017    LYMPHOPCT 36.9 08/07/2017    MONOPCT 15.3 08/07/2017    BASOPCT 1.1 08/07/2017    MONOSABS 0.6 08/07/2017    LYMPHSABS 1.6 08/07/2017    EOSABS 0.2 08/07/2017    BASOSABS 0.0 08/07/2017     CMP:    Lab Results   Component Value Date     08/08/2017    K 3.9 08/08/2017    CL 95 08/08/2017    CO2 22 08/08/2017    BUN 25 08/08/2017    CREATININE 1.24 08/08/2017    GFRAA 49.7 08/08/2017    LABGLOM 41.1 08/08/2017    GLUCOSE 109 08/08/2017    PROT 7.2 08/07/2017    LABALBU 3.9 08/07/2017    CALCIUM 7.9 08/08/2017    BILITOT 0.4 08/07/2017    ALKPHOS 53 08/07/2017    AST 28 08/07/2017    ALT 16 08/07/2017     BMP:    Lab Results   Component Value Date     08/08/2017    K 3.9 08/08/2017    CL 95 08/08/2017    CO2 22 08/08/2017    BUN 25 08/08/2017    LABALBU 3.9 08/07/2017    CREATININE 1.24 08/08/2017    CALCIUM 7.9 08/08/2017    GFRAA 49.7 08/08/2017    LABGLOM 41.1 08/08/2017    GLUCOSE 109 08/08/2017     Magnesium:    Lab Results   Component Value Date    MG 2.4 08/08/2017     Troponin:    Lab Results   Component Value Date    TROPONINI <0.010 08/08/2017       EKG:

## 2017-08-09 VITALS
WEIGHT: 140.4 LBS | HEART RATE: 60 BPM | RESPIRATION RATE: 18 BRPM | BODY MASS INDEX: 23.97 KG/M2 | SYSTOLIC BLOOD PRESSURE: 132 MMHG | HEIGHT: 64 IN | DIASTOLIC BLOOD PRESSURE: 69 MMHG | OXYGEN SATURATION: 98 % | TEMPERATURE: 97.6 F

## 2017-08-09 LAB
ANION GAP SERPL CALCULATED.3IONS-SCNC: 15 MEQ/L (ref 7–13)
BUN BLDV-MCNC: 23 MG/DL (ref 8–23)
CALCIUM SERPL-MCNC: 8.6 MG/DL (ref 8.6–10.2)
CHLORIDE BLD-SCNC: 100 MEQ/L (ref 98–107)
CO2: 23 MEQ/L (ref 22–29)
CREAT SERPL-MCNC: 1.3 MG/DL (ref 0.5–0.9)
GFR AFRICAN AMERICAN: 47.1
GFR NON-AFRICAN AMERICAN: 38.9
GLUCOSE BLD-MCNC: 97 MG/DL (ref 74–109)
HCT VFR BLD CALC: 36.1 % (ref 37–47)
HEMOGLOBIN: 12 G/DL (ref 12–16)
MCH RBC QN AUTO: 30.5 PG (ref 27–31.3)
MCHC RBC AUTO-ENTMCNC: 33.2 % (ref 33–37)
MCV RBC AUTO: 92 FL (ref 82–100)
PDW BLD-RTO: 15.3 % (ref 11.5–14.5)
PLATELET # BLD: 141 K/UL (ref 130–400)
POTASSIUM SERPL-SCNC: 3.8 MEQ/L (ref 3.5–5.1)
RBC # BLD: 3.92 M/UL (ref 4.2–5.4)
SODIUM BLD-SCNC: 138 MEQ/L (ref 132–144)
URINE CULTURE, ROUTINE: NORMAL
WBC # BLD: 4.9 K/UL (ref 4.8–10.8)

## 2017-08-09 PROCEDURE — 6370000000 HC RX 637 (ALT 250 FOR IP): Performed by: PHYSICIAN ASSISTANT

## 2017-08-09 PROCEDURE — 2060000000 HC ICU INTERMEDIATE R&B

## 2017-08-09 PROCEDURE — 2580000003 HC RX 258: Performed by: PHYSICIAN ASSISTANT

## 2017-08-09 PROCEDURE — 85027 COMPLETE CBC AUTOMATED: CPT

## 2017-08-09 PROCEDURE — G0378 HOSPITAL OBSERVATION PER HR: HCPCS

## 2017-08-09 PROCEDURE — 80048 BASIC METABOLIC PNL TOTAL CA: CPT

## 2017-08-09 PROCEDURE — 99238 HOSP IP/OBS DSCHRG MGMT 30/<: CPT | Performed by: INTERNAL MEDICINE

## 2017-08-09 PROCEDURE — 36415 COLL VENOUS BLD VENIPUNCTURE: CPT

## 2017-08-09 PROCEDURE — 6370000000 HC RX 637 (ALT 250 FOR IP): Performed by: INTERNAL MEDICINE

## 2017-08-09 RX ADMIN — LISINOPRIL 20 MG: 20 TABLET ORAL at 08:09

## 2017-08-09 RX ADMIN — LEVOTHYROXINE SODIUM 50 MCG: 50 TABLET ORAL at 06:18

## 2017-08-09 RX ADMIN — SERTRALINE HYDROCHLORIDE 50 MG: 50 TABLET ORAL at 08:09

## 2017-08-09 RX ADMIN — CLOPIDOGREL BISULFATE 75 MG: 75 TABLET ORAL at 08:09

## 2017-08-09 RX ADMIN — AMLODIPINE BESYLATE 2.5 MG: 2.5 TABLET ORAL at 08:10

## 2017-08-09 RX ADMIN — ASPIRIN 81 MG 81 MG: 81 TABLET ORAL at 08:09

## 2017-08-09 RX ADMIN — ATENOLOL 50 MG: 50 TABLET ORAL at 08:09

## 2017-08-09 RX ADMIN — ACETAMINOPHEN 650 MG: 325 TABLET ORAL at 16:15

## 2017-08-09 RX ADMIN — SODIUM CHLORIDE, PRESERVATIVE FREE 10 ML: 5 INJECTION INTRAVENOUS at 08:09

## 2017-08-09 RX ADMIN — ISOSORBIDE MONONITRATE 30 MG: 30 TABLET, EXTENDED RELEASE ORAL at 08:09

## 2017-08-09 ASSESSMENT — PAIN SCALES - GENERAL
PAINLEVEL_OUTOF10: 0
PAINLEVEL_OUTOF10: 0
PAINLEVEL_OUTOF10: 4
PAINLEVEL_OUTOF10: 0

## 2017-08-09 ASSESSMENT — PAIN DESCRIPTION - LOCATION: LOCATION: HEAD

## 2017-08-09 ASSESSMENT — PAIN DESCRIPTION - PAIN TYPE: TYPE: ACUTE PAIN

## 2017-08-09 ASSESSMENT — PAIN DESCRIPTION - DESCRIPTORS: DESCRIPTORS: ACHING

## 2017-08-09 NOTE — DISCHARGE SUMMARY
Cardiology Discharge Summary      Patient Identification:  Darrell Jones  : 1932  MRN: [id number]   Account: [de-identified]     Admit date: 2017  Discharge date: 7:05 AM    Attending provider: Dixon Colon MD        Primary care provider: Madison Brown MD     Admission Diagnoses:  Syncope        Discharge Diagnoses: Active Hospital Problems    Diagnosis Date Noted    Syncope and collapse [R55]      Priority: High       Syncope  Hospital Course:   Darrell Jones is a 80 y.o. female admitted to [facility] on 2017 for . 41-year-old female presents to the hospital with syncope. She has permanent pacing from previous. Interrogation demonstrated end of battery life. Patient was pacing VVIR. Generator was changed. Procedures:    Pacer Generator change     Consults:   None    Examination:  BP (!) 171/68  Pulse 60  Temp 97.7 °F (36.5 °C) (Oral)   Resp 18  Ht 5' 4\" (1.626 m)  Wt 140 lb 6.4 oz (63.7 kg)  SpO2 98%  BMI 24.1 kg/m2   Physical Exam   Constitutional: She is oriented to person, place, and time. She appears well-developed and well-nourished. No distress. HENT:   Head: Normocephalic and atraumatic. Eyes: EOM are normal. Pupils are equal, round, and reactive to light. Neck: Normal range of motion. Neck supple. No JVD present. No tracheal deviation present. No thyromegaly present. Cardiovascular: Normal rate, regular rhythm and intact distal pulses. No murmur heard. Pulmonary/Chest: Effort normal and breath sounds normal. No respiratory distress. She has no wheezes. She has no rales. She exhibits no tenderness. Abdominal: Soft. Bowel sounds are normal. There is no tenderness. Musculoskeletal: Normal range of motion. She exhibits no edema or deformity. Neurological: She is alert and oriented to person, place, and time. Skin: Skin is warm and dry. No rash noted.    Psychiatric: Her behavior is normal. consistent with chronic small vessel disease. There are atherosclerotic calcifications of the cavernous carotid arteries. There is opacification of a right ethmoid air cell, otherwise the visualized paranasal sinuses and mastoid air cells are well aerated. Native ocular lenses are absent. There is hyperostosis frontalis interna, a benign age related finding. 1. No appreciable acute intracranial abnormality. Note: An acute ischemic event or extension of chronic ischemic process may not be initially evident on CT. Recommend further evaluation with MRI as clinically indicated. 2. Interval progression of chronic small vessel ischemic disease. 3. Opacification of an ethmoid air cell. 4. Global atrophy and atherosclerosis. Xr Chest Portable    Result Date: 8/8/2017  XR CHEST PORTABLE : 8/8/2017 CLINICAL HISTORY: Pacemaker placement and rule out pneumothorax . COMPARISON: 8/7/2017. TECHNIQUE: A portable upright AP radiograph of the chest was obtained. FINDINGS: A shallow inspiration is present without significant infiltrate identified. The cardiac and mediastinal silhouettes appear within normal limits for the patient's age group and technique, with a left subclavian dual-lead pacemaker in good apparent position. There is no significant pleural effusion, vascular congestion, pneumothorax, or displaced fractures identified     LEFT SUBCLAVIAN DUAL-LEAD PACEMAKER IN GOOD APPARENT POSITION. NO PNEUMOTHORAX, OR SIGNIFICANT CHANGE FROM 8/7/2017 IDENTIFIED. Xr Chest Portable    Result Date: 8/7/2017  EXAMINATION: XR CHEST PORTABLE, 8/7/2017 3:00 PM History: 26-year-old female, fainted today COMPARISON:  July 18, 2014 FINDINGS: The lungs are clear. The costophrenic angles are clear. The cardiac silhouette is normal in size. Aortic arch calcifications. The pulmonary vascularity is normal size. Elevation of the right humeral head, suggesting chronic right rotator cuff tear. Monitoring leads project across the thorax. There is a left chest wall cardiac device with 2 leads in stable position. No acute pulmonary parenchymal abnormality.        Labs:   Recent Results (from the past 72 hour(s))   CBC Auto Differential    Collection Time: 08/07/17  3:00 PM   Result Value Ref Range    WBC 4.2 (L) 4.8 - 10.8 K/uL    RBC 4.02 (L) 4.20 - 5.40 M/uL    Hemoglobin 12.4 12.0 - 16.0 g/dL    Hematocrit 37.0 37.0 - 47.0 %    MCV 92.1 82.0 - 100.0 fL    MCH 30.9 27.0 - 31.3 pg    MCHC 33.5 33.0 - 37.0 %    RDW 15.4 (H) 11.5 - 14.5 %    Platelets 570 315 - 244 K/uL    Neutrophils % 43.1 %    Lymphocytes % 36.9 %    Monocytes % 15.3 %    Eosinophils % 3.6 %    Basophils % 1.1 %    Neutrophils # 1.8 1.4 - 6.5 K/uL    Lymphocytes # 1.6 1.0 - 4.8 K/uL    Monocytes # 0.6 0.2 - 0.8 K/uL    Eosinophils # 0.2 0.0 - 0.7 K/uL    Basophils # 0.0 0.0 - 0.2 K/uL   Comprehensive Metabolic Panel    Collection Time: 08/07/17  3:00 PM   Result Value Ref Range    Sodium 131 (L) 132 - 144 mEq/L    Potassium 4.1 3.5 - 5.1 mEq/L    Chloride 94 (L) 98 - 107 mEq/L    CO2 25 22 - 29 mEq/L    Anion Gap 12 7 - 13 mEq/L    Glucose 96 74 - 109 mg/dL    BUN 30 (H) 8 - 23 mg/dL    CREATININE 1.44 (H) 0.50 - 0.90 mg/dL    GFR Non-African American 34.6 (L) >60    GFR  41.9 (L) >60    Calcium 8.5 (L) 8.6 - 10.2 mg/dL    Total Protein 7.2 6.4 - 8.1 g/dL    Alb 3.9 3.9 - 4.9 g/dL    Total Bilirubin 0.4 0.0 - 1.2 mg/dL    Alkaline Phosphatase 53 40 - 130 U/L    ALT 16 0 - 33 U/L    AST 28 0 - 35 U/L    Globulin 3.3 2.3 - 3.5 g/dL   Magnesium    Collection Time: 08/07/17  3:00 PM   Result Value Ref Range    Magnesium 2.3 1.7 - 2.3 mg/dL   Troponin    Collection Time: 08/07/17  3:00 PM   Result Value Ref Range    Troponin <0.010 0.000 - 0.010 ng/mL   CK    Collection Time: 08/07/17  3:00 PM   Result Value Ref Range    Total  (H) 0 - 170 U/L   Urine Reflex to Culture    Collection Time: 08/07/17  3:00 PM   Result Value Ref Range    Color, UA Yellow Straw/Yellow Clarity, UA Clear Clear    Glucose, Ur Negative Negative mg/dL    Bilirubin Urine Negative Negative    Ketones, Urine Negative Negative mg/dL    Specific Gravity, UA 1.007 1.005 - 1.030    Blood, Urine Negative Negative    pH, UA 7.0 5.0 - 9.0    Protein, UA Negative Negative mg/dL    Urobilinogen, Urine 0.2 <2.0 E.U./dL    Nitrite, Urine Negative Negative    Leukocyte Esterase, Urine TRACE (A) Negative    Urine Reflex to Culture YES    Lactic Acid, Plasma    Collection Time: 08/07/17  3:00 PM   Result Value Ref Range    Lactic Acid 1.2 0.5 - 2.2 mmol/L   EKG 12 Lead - Chest Pain    Collection Time: 08/07/17  3:19 PM   Result Value Ref Range    Ventricular Rate 65 BPM    Atrial Rate 63 BPM    QRS Duration 178 ms    Q-T Interval 522 ms    QTc Calculation (Bazett) 542 ms    R Axis -64 degrees    T Axis 83 degrees   CKMB & RELATIVE PERCENT    Collection Time: 08/07/17  4:51 PM   Result Value Ref Range    CK-MB 4.1 (H) 0.0 - 3.8 ng/mL    CK-MB Index 2.0 0.0 - 3.5 %   Urine Culture    Collection Time: 08/07/17  4:51 PM   Result Value Ref Range    Urine Culture, Routine No growth in <24 hours, culture reincubated    Microscopic Urinalysis    Collection Time: 08/07/17  4:51 PM   Result Value Ref Range    WBC, UA 3-5 0 - 5 /HPF    RBC, UA 3-5 (A) 0 - 2 /HPF    Bacteria, UA Few /HPF   Troponin    Collection Time: 08/07/17  5:28 PM   Result Value Ref Range    Troponin <0.010 0.000 - 0.010 ng/mL   Brain Natriuretic Peptide    Collection Time: 08/07/17  5:28 PM   Result Value Ref Range    Pro-BNP 4337 pg/mL   Troponin    Collection Time: 08/08/17  6:14 AM   Result Value Ref Range    Troponin <0.010 0.000 - 0.010 ng/mL   Basic metabolic panel    Collection Time: 08/08/17  6:14 AM   Result Value Ref Range    Sodium 130 (L) 132 - 144 mEq/L    Potassium 3.9 3.5 - 5.1 mEq/L    Chloride 95 (L) 98 - 107 mEq/L    CO2 22 22 - 29 mEq/L    Anion Gap 13 7 - 13 mEq/L    Glucose 109 74 - 109 mg/dL    BUN 25 (H) 8 - 23 mg/dL    CREATININE

## 2017-08-09 NOTE — PROCEDURES
[address] [facility]                     [address] [facility], [address] [facility]                                PROCEDURE NOTE    PATIENT NAME: Krystle SPEARS                                  :       1932  MED REC NO:   [id number]                                       ROOM:      Q579  ACCOUNT NO:   [de-identified]                                      ADMISSION  DATE:  2017  PROVIDER:     Chikis Walter DO      DATE OF PROCEDURE:  2017    PROCEDURE PERFORMED:  Dual-chamber permanent pacemaker generator  replacement. INDICATION:  Battery end of life. PROCEDURE PERFORMED:  Varun Walter D.O.    COMPLICATIONS:  None. DESCRIPTION OF PROCEDURE: The patient was brought to the cardiac cath  lab suite where she was sterilely prepped and draped in usual fashion. Lidocaine 1% was used to anesthetize the left subclavian site and an  incision was made over the previous generator. Dissection was made at  the level of the generator and the generator was freed from the  pocket, and leads were unsecured with the set screw provided and  connected to a new generator in the appropriate chambers. They were  secured into place. Sensing and pacing thresholds were checked and  the generator was placed in the pocket with the leads posterior. Subcutaneous and dermal layers were sutured with Vicryl sutures. Steri-Strips were overlaid. The patient tolerated the procedure well. She was transferred to the postop holding area in stable and  satisfactory condition. ASSESSMENT:  Status post successful dual-chamber permanent pacemaker  generator replacement.     PLAN:  Post procedure care as usual.        Pepe Espinoza DO    D: 2017 11:53:01       T: 2017 12:28:19     [email or web address]  Job#: [id number]     Doc#: [id number]

## 2017-08-10 LAB
EKG ATRIAL RATE: 63 BPM
EKG ATRIAL RATE: 72 BPM
EKG Q-T INTERVAL: 508 MS
EKG Q-T INTERVAL: 522 MS
EKG QRS DURATION: 172 MS
EKG QRS DURATION: 178 MS
EKG QTC CALCULATION (BAZETT): 528 MS
EKG QTC CALCULATION (BAZETT): 542 MS
EKG R AXIS: -57 DEGREES
EKG R AXIS: -64 DEGREES
EKG T AXIS: 144 DEGREES
EKG T AXIS: 83 DEGREES
EKG VENTRICULAR RATE: 65 BPM
EKG VENTRICULAR RATE: 65 BPM

## 2017-08-14 RX ORDER — AMMONIUM LACTATE 12 G/100G
LOTION TOPICAL
Qty: 1 BOTTLE | Refills: 3 | Status: SHIPPED | OUTPATIENT
Start: 2017-08-14 | End: 2017-12-11 | Stop reason: ALTCHOICE

## 2017-08-17 ENCOUNTER — OFFICE VISIT (OUTPATIENT)
Dept: CARDIOLOGY | Age: 82
End: 2017-08-17

## 2017-08-17 VITALS
BODY MASS INDEX: 23.9 KG/M2 | RESPIRATION RATE: 15 BRPM | HEART RATE: 60 BPM | OXYGEN SATURATION: 97 % | TEMPERATURE: 98.7 F | WEIGHT: 140 LBS | SYSTOLIC BLOOD PRESSURE: 171 MMHG | HEIGHT: 64 IN | DIASTOLIC BLOOD PRESSURE: 76 MMHG

## 2017-08-17 DIAGNOSIS — F32.5 MAJOR DEPRESSION, SINGLE EPISODE, IN COMPLETE REMISSION (HCC): ICD-10-CM

## 2017-08-17 DIAGNOSIS — Z95.0 STATUS POST PLACEMENT OF OTHER CARDIAC PACEMAKER: ICD-10-CM

## 2017-08-17 DIAGNOSIS — Z95.5 HISTORY OF CORONARY ARTERY STENT PLACEMENT: ICD-10-CM

## 2017-08-17 DIAGNOSIS — I45.9 CONDUCTION DISORDER OF THE HEART: Primary | ICD-10-CM

## 2017-08-17 DIAGNOSIS — I15.9 SECONDARY HYPERTENSION: ICD-10-CM

## 2017-08-17 DIAGNOSIS — R55 SYNCOPE, UNSPECIFIED SYNCOPE TYPE: ICD-10-CM

## 2017-08-17 DIAGNOSIS — I25.10 CORONARY ARTERY DISEASE INVOLVING NATIVE CORONARY ARTERY OF NATIVE HEART WITHOUT ANGINA PECTORIS: ICD-10-CM

## 2017-08-17 PROCEDURE — 93000 ELECTROCARDIOGRAM COMPLETE: CPT | Performed by: INTERNAL MEDICINE

## 2017-08-17 PROCEDURE — 99214 OFFICE O/P EST MOD 30 MIN: CPT | Performed by: INTERNAL MEDICINE

## 2017-08-17 RX ORDER — CHLORTHALIDONE 25 MG/1
25 TABLET ORAL DAILY
COMMUNITY
Start: 2017-05-24

## 2017-08-17 RX ORDER — CARVEDILOL 12.5 MG/1
25 TABLET ORAL 2 TIMES DAILY
COMMUNITY
Start: 2017-05-24 | End: 2020-08-19 | Stop reason: DRUGHIGH

## 2017-08-17 NOTE — PROGRESS NOTES
Lennox Old   80 y.o. female  Chief Complaint   Patient presents with    Coronary Artery Disease     history of PCI    Loss of Consciousness     Recent hosptalization for Syncope        History and Physical:  Mrs. Diogenes Vidales is a pleasant 43-year-old female at my office for a follow-up visit to the hospitalization of August 7, 2017. The patient at the time apparently blacked out at home while sitting down. She was brought to the hospital for evaluation but there was no evidence of any acute coronary event. There was no evidence of any complex dysrhythmia, and the pacemaker evaluation showed end-of-life pacer, so this was replaced by Dr. Marbella Fontenot. The patient remained stable throughout her hospitalization while being monitored by the nursing staff. PAST MEDICAL HISTORY:     -     Pertinent for coronary disease post PCI of the LAD in 2006 she had had the pacemaker sent since         2006  -     She is known to have history of hyper-tension    Past Medical History: He  has a past medical history of BPH (benign prostati*    Past Medical History: She  has a past medical history of Anxiety; Depression; Heart disease; Hypertension; and Hypothyroidism. Past Surgical History: She  has a past surgical history that includes Hysterectomy; Pacemaker insertion; and Breast cyst aspiration (Right, 1/17/14). Family History: No family history on file. Social History: She  reports that she has never smoked. She does not have any smokeless tobacco history on file. She reports that she does not drink alcohol or use illicit drugs.     Current Medications:   Current Outpatient Prescriptions on File Prior to Visit   Medication Sig Dispense Refill    ammonium lactate (LAC-HYDRIN) 12 % lotion apply to affected area topically twice a day 1 Bottle 3    NITROSTAT 0.4 MG SL tablet place 1 tablet under the tongue if needed every 5 minutes for chest pain for 3 doses IF NO RELIEF AFTER 3RD DOSE CALL PRESCRIBER . 25 tablet 11    clopidogrel (PLAVIX) 75 MG tablet Take 75 mg by mouth daily.  atenolol (TENORMIN) 50 MG tablet Take 50 mg by mouth daily.  amLODIPine (NORVASC) 2.5 MG tablet Take 2.5 mg by mouth daily.  isosorbide mononitrate (IMDUR) 30 MG CR tablet Take 30 mg by mouth daily.  levothyroxine (SYNTHROID) 50 MCG tablet Take 50 mcg by mouth Daily.  lisinopril (PRINIVIL;ZESTRIL) 20 MG tablet Take 20 mg by mouth daily.  sertraline (ZOLOFT) 50 MG tablet Take 50 mg by mouth daily.  LORazepam (ATIVAN) 1 MG tablet Take 1 mg by mouth every 6 hours as needed. No current facility-administered medications on file prior to visit. Allergies:  Sulfa antibiotics    Review of Systems  Review of Systems   Constitutional:        The patient is doing quite well post her recent hospitalization with no further incidence of syncope. She continues to live alone but has two daughter who provide her with support and stop regularly to check on her. She remains fairly active and is still driving herself. HENT: Negative. Eyes: Negative. Respiratory: Negative for chest tightness and shortness of breath. Cardiovascular: Negative for chest pain, palpitations and leg swelling. Gastrointestinal: Negative. Endocrine: Negative. Genitourinary: Negative. Musculoskeletal: Positive for arthralgias. Generalized age related arthritic changes. Skin:        The pacemaker incision and pocket are well healed and there is no signs of inflammation or infection. The patient is not complaining of any discomfort at the site. Allergic/Immunologic: Negative. Neurological: Negative for dizziness, syncope and light-headedness. The recent hospitalization was for an episode of syncope while at home in a resting state. The patients work up in the hospital was essentially negative besides the pacemaker battery being at end of life. She has had no recurrence of any symptoms since being discharged. Hematological: Bruises/bleeds easily. On daily antiplatelet therapy. Psychiatric/Behavioral: Negative. PHYSICAL EXAM: The exam revealed Mrs. Ervin to be pleasant and she remains mentally sharp. She is  fairly well preserved for her age and she continues to live alone, but there are 2 daughters close by who takes care of her. Skin is normal. JVP down. No cervical bruit. No lymphadenopathy or thyromegaly. The lungs are clear. The heart sounds are normal, could not be sure of any extra sounds. The abdominal exam revealed no rigidity, no tenderness, and no organomegaly. The lower extremity exam revealed no evidence of any ischemia and no edema. I did have the patient standup for a few minutes and to the blood pressure was still in the range of 05-79 systolic over 48/57 diastolic. The temperature 98.7. Heart rate 70/m and regular. O2 sat 97%. She is not overweight with a BMI of 24    The 12 leads EKG revealed the presence of at the paced rhythm and complete left bundle-branch block    ASSESSMENT:     Recent syncopal episode with prior history of fainting spells resumed etiology is end-of-life O for pacemaker but more likely is orthostatic changes. Well-controlled hypertension for patient's age    Stable coronary artery disease    RECOMMENDATIONS:    The above discussed with Mrs. Ervin    Recommended decreasing chlorthalidone dose to 12.5-12.5 mg every other day set of daily to decrease amlodipine dose to 12.5 mg a day. I advised that systolic blood pressure of 160s to 170s quite acceptable and not to increase antihypertensive treatment. To keep her regular appointment in 6 month. ADDENDUM: The pacemaker incision is noted to be well-healed and the pacemaker pocket is also noted to be not swollen and no evidence of any inflammation. Diagnostics:    Orders Placed This Encounter   Procedures    EKG 12 Lead     Order Specific Question:   Reason for Exam?     Answer:    Other Medications:  No orders of the defined types were placed in this encounter. Return in about 6 months (around 2/17/2018). Salvador Dailey MD, F.A.C.C.

## 2017-10-06 ASSESSMENT — ENCOUNTER SYMPTOMS
EYES NEGATIVE: 1
SHORTNESS OF BREATH: 0
CHEST TIGHTNESS: 0
GASTROINTESTINAL NEGATIVE: 1
ALLERGIC/IMMUNOLOGIC NEGATIVE: 1

## 2017-10-13 DIAGNOSIS — I51.9 HEART DISEASE: ICD-10-CM

## 2017-10-13 DIAGNOSIS — I10 ESSENTIAL HYPERTENSION: Chronic | ICD-10-CM

## 2017-10-13 DIAGNOSIS — E03.9 HYPOTHYROIDISM, UNSPECIFIED TYPE: Primary | Chronic | ICD-10-CM

## 2017-10-13 RX ORDER — CLOPIDOGREL BISULFATE 75 MG/1
75 TABLET ORAL DAILY
Qty: 30 TABLET | Refills: 11 | Status: SHIPPED | OUTPATIENT
Start: 2017-10-13

## 2017-10-13 RX ORDER — LEVOTHYROXINE SODIUM 0.05 MG/1
50 TABLET ORAL DAILY
Qty: 30 TABLET | Refills: 11 | Status: SHIPPED | OUTPATIENT
Start: 2017-10-13

## 2017-10-27 ENCOUNTER — HOSPITAL ENCOUNTER (EMERGENCY)
Age: 82
Discharge: HOME | End: 2017-10-27
Payer: MEDICARE

## 2017-10-27 VITALS
DIASTOLIC BLOOD PRESSURE: 92 MMHG | OXYGEN SATURATION: 98 % | WEIGHT: 136 LBS | TEMPERATURE: 97.9 F | SYSTOLIC BLOOD PRESSURE: 182 MMHG | BODY MASS INDEX: 23.22 KG/M2 | HEIGHT: 64 IN | HEART RATE: 63 BPM

## 2017-10-27 DIAGNOSIS — N93.9 ABNORMAL VAGINAL BLEEDING: Primary | ICD-10-CM

## 2017-10-27 DIAGNOSIS — N30.01 ACUTE CYSTITIS WITH HEMATURIA: ICD-10-CM

## 2017-10-27 LAB
AMORPHOUS: ABNORMAL
BACTERIA: ABNORMAL /HPF
BILIRUBIN URINE: NEGATIVE
BLOOD, URINE: ABNORMAL
CASTS: ABNORMAL /LPF
CLARITY: ABNORMAL
COLOR: YELLOW
GLUCOSE URINE: NEGATIVE MG/DL
KETONES, URINE: NEGATIVE MG/DL
LEUKOCYTE ESTERASE, URINE: ABNORMAL
NITRITE, URINE: NEGATIVE
PH UA: 6.5 (ref 5–9)
PROTEIN UA: 100 MG/DL
RBC UA: ABNORMAL /HPF (ref 0–2)
SPECIFIC GRAVITY UA: 1.02 (ref 1–1.03)
URINE REFLEX TO CULTURE: YES
UROBILINOGEN, URINE: 1 E.U./DL
WBC UA: ABNORMAL /HPF (ref 0–5)

## 2017-10-27 PROCEDURE — 81001 URINALYSIS AUTO W/SCOPE: CPT

## 2017-10-27 PROCEDURE — 99283 EMERGENCY DEPT VISIT LOW MDM: CPT

## 2017-10-27 PROCEDURE — 87086 URINE CULTURE/COLONY COUNT: CPT

## 2017-10-27 RX ORDER — CEPHALEXIN 500 MG/1
500 CAPSULE ORAL 3 TIMES DAILY
Qty: 21 CAPSULE | Refills: 0 | Status: SHIPPED | OUTPATIENT
Start: 2017-10-27 | End: 2017-11-03

## 2017-10-27 ASSESSMENT — ENCOUNTER SYMPTOMS
COLOR CHANGE: 0
COUGH: 0
CHEST TIGHTNESS: 0
SHORTNESS OF BREATH: 0
BACK PAIN: 0
NAUSEA: 0
CONSTIPATION: 0
VOMITING: 0
TROUBLE SWALLOWING: 0
DIARRHEA: 0
ABDOMINAL PAIN: 0

## 2017-10-27 NOTE — ED PROVIDER NOTES
[address] [facility] ED  eMERGENCY dEPARTMENT eNCOUnter      Pt Name: Duyen Dacosta  MRN: [id number]  Anitagfamber 7/8/1932  Date of evaluation: 10/27/2017  Provider: Jason Hogan NP     CHIEF COMPLAINT       Chief Complaint   Patient presents with    Vaginal Bleeding     since this am. denies any dysuria. HISTORY OF PRESENT ILLNESS   (Location/Symptom, Timing/Onset, Context/Setting, Quality, Duration, Modifying Factors, Severity) Note limiting factors. Is an 80-year-old female patient who comes to the emergency room with a one-day history of vaginal bleeding. She noticed when she was taking a bath this morning that she had some anginal vaginal bleeding when she washed and then later when she used the bathroom. Patient states that it is coming from her vagina and her rectum. Patient denies any abdominal pain, dysuria or frequency. She denies any fever, chills, nausea, vomiting, diarrhea, headache, lightheadedness, dizziness, blurred or double vision. Patient states that the blood was a light and was a small amount like spotting. Patient states that she has a history of a hysterectomy and she thinks that they took everything including her fallopian tubes and ovaries. Patient denies any recent travel or sick contacts. Patient lives at home alone, still works outside in her garden, and is able to drive. The history is provided by the patient. No  was used. Vaginal Bleeding   Quality:  Spotting (light red)  Severity:  Mild  Onset quality:  Sudden  Duration: Patient noticed it this morning however has been to the bathroom multiple times and hasn't noticed any further bleeding or spotting.   Timing:  Sporadic  Progression:  Unchanged  Chronicity:  New  Menstrual history:  Postmenopausal  Number of pads used:  0  Number of tampons used:  0  Possible pregnancy: no    Context: after urination and spontaneously    Context: not after intercourse, not at rest, not during intercourse, not during urination, not foreign body and not genital trauma    Relieved by:  None tried  Worsened by:  Nothing  Ineffective treatments:  None tried  Associated symptoms: no abdominal pain, no back pain, no dizziness, no dyspareunia, no dysuria, no fatigue, no fever, no nausea and no vaginal discharge    Risk factors: no bleeding disorder, no hx of ectopic pregnancy, no hx of endometriosis, no gynecological surgery, does not have multiple partners, no new sexual partner, no ovarian cysts, no ovarian torsion, no PID, no prior miscarriage, no STD, no STD exposure, no terminated pregnancies and does not have unprotected sex        Nursing Notes were reviewed. REVIEW OF SYSTEMS    (2+ for level 4; 10+ for level 5)     Review of Systems   Constitutional: Negative for chills, fatigue and fever. HENT: Negative for congestion and trouble swallowing. Respiratory: Negative for cough, chest tightness and shortness of breath. Cardiovascular: Negative for chest pain and leg swelling. Gastrointestinal: Negative for abdominal pain, constipation, diarrhea, nausea and vomiting. Genitourinary: Positive for vaginal bleeding. Negative for dyspareunia, dysuria and vaginal discharge. Musculoskeletal: Negative for arthralgias and back pain. Skin: Negative for color change. Neurological: Negative for dizziness, weakness, light-headedness and headaches. Psychiatric/Behavioral: Negative for agitation and behavioral problems. Except as noted above the remainder of the review of systems was reviewed and negative.      PAST MEDICAL HISTORY     Past Medical History:   Diagnosis Date    Anxiety     Depression     Heart disease     Hypertension     Hypothyroidism        SURGICAL HISTORY       Past Surgical History:   Procedure Laterality Date    BREAST CYST ASPIRATION Right 1/17/14    U/S guided core bx of the right breast    HYSTERECTOMY      PACEMAKER INSERTION         CURRENT MEDICATIONS       Discharge 4\" (1.626 m) 136 lb (61.7 kg)       Physical Exam   Constitutional: She is oriented to person, place, and time. She appears well-developed and well-nourished. HENT:   Head: Normocephalic and atraumatic. Eyes: Conjunctivae are normal.   Neck: Normal range of motion. Neck supple. Cardiovascular: Normal rate. Pulmonary/Chest: Effort normal and breath sounds normal.   Abdominal: Soft. Bowel sounds are normal. She exhibits no distension and no mass. There is no tenderness. There is no rebound and no guarding. Genitourinary: There is no rash, tenderness, lesion or injury on the right labia. There is no rash, tenderness, lesion or injury on the left labia. No erythema, tenderness or bleeding in the vagina. No foreign body in the vagina. No signs of injury around the vagina. No vaginal discharge found. Genitourinary Comments: Performed a pelvic exam on this patient with a chaperone by the name of Betito Arita RN. Patient has no external lesions on her labia or vagina. Patient's internal pelvic exam shows no bleeding at this time. Musculoskeletal: Normal range of motion. Neurological: She is alert and oriented to person, place, and time. Skin: Skin is warm and dry. Psychiatric: She has a normal mood and affect.        DIAGNOSTIC RESULTS     EKG: All EKG's are interpreted by the Emergency Department Physician who either signs or Co-signs this chart in the absence of a cardiologist.        RADIOLOGY:   Non-plain film images such as CT, Ultrasound and MRI are read by the radiologist. Plain radiographic images are visualized and preliminarily interpreted by the emergency physician with the below findings:      Interpretation per the Radiologist below (if available at the time of note entry):    No orders to display       LABS:  Labs Reviewed   URINE RT REFLEX TO CULTURE - Abnormal; Notable for the following:        Result Value    Clarity, UA CLOUDY (*)     Blood, Urine TRACE (*)     Protein,  (*) Leukocyte Esterase, Urine LARGE (*)     All other components within normal limits   MICROSCOPIC URINALYSIS - Abnormal; Notable for the following:     WBC, UA 20-50 (*)     All other components within normal limits   URINE CULTURE       All other labs were within normal range or not returned as of this dictation. EMERGENCY DEPARTMENT COURSE and DIFFERENTIAL DIAGNOSIS/MDM:   Vitals:    Vitals:    10/27/17 1355   BP: (!) 182/92   Pulse: 63   Temp: 97.9 °F (36.6 °C)   TempSrc: Oral   SpO2: 98%   Weight: 136 lb (61.7 kg)   Height: 5' 4\" (1.626 m)       MDM    Patient presents to the emergency room with complaints of light red spotting from her vagina that she noticed this morning. Patient had no complaints of dysuria however I ordered a UA to rule out a urinary tract infection. I also performed a pelvic exam which was negative for any visible plaque. I wanted to have the patient get an ultrasound while in the emergency room to rule out any abnormalities, however patient declined to stay at this time because she has to get her daughter and go to the airport to  her granddaughter who is coming in from [place]. Patient states that she will follow up with GYN. Patient's urine shows that she does have a urinary tract infection and patient will be sent home with Keflex. Patient instructed to return to the ER for any increased bleeding, abdominal pain, fever of 101 or greater, and any new or concerning symptoms. The patient verbalizes understanding and has no further questions comments or concerns at this time. Patient was referred to Dr. Mayra Harris for further evaluation of the vaginal bleeding. CRITICAL CARE TIME     Total Critical Care time (not applicable if blank)      Total minutes, excluding separately reportable procedures. There was a high probability of clinically significant/life threatening deterioration in the patient's condition which required my urgent intervention.  This includes discussing

## 2017-10-29 LAB — URINE CULTURE, ROUTINE: NORMAL

## 2017-11-07 ENCOUNTER — HOSPITAL ENCOUNTER (OUTPATIENT)
Dept: CARDIOLOGY | Age: 82
Discharge: HOME | End: 2017-11-07
Payer: MEDICARE

## 2017-11-07 PROCEDURE — 93296 REM INTERROG EVL PM/IDS: CPT

## 2017-11-08 ENCOUNTER — OFFICE VISIT (OUTPATIENT)
Dept: OBGYN | Age: 82
End: 2017-11-08

## 2017-11-08 VITALS
BODY MASS INDEX: 24.66 KG/M2 | DIASTOLIC BLOOD PRESSURE: 92 MMHG | SYSTOLIC BLOOD PRESSURE: 168 MMHG | WEIGHT: 134 LBS | HEIGHT: 62 IN

## 2017-11-08 DIAGNOSIS — N76.1 SUBACUTE VAGINITIS: ICD-10-CM

## 2017-11-08 DIAGNOSIS — Z01.419 NORMAL GYNECOLOGIC EXAMINATION: Primary | ICD-10-CM

## 2017-11-08 PROCEDURE — 99387 INIT PM E/M NEW PAT 65+ YRS: CPT | Performed by: OBSTETRICS & GYNECOLOGY

## 2017-11-08 ASSESSMENT — ENCOUNTER SYMPTOMS
RESPIRATORY NEGATIVE: 1
GASTROINTESTINAL NEGATIVE: 1

## 2017-11-08 NOTE — PROGRESS NOTES
Postmenopausal Annual     Kyle Neff is a 80y.o. year old  No obstetric history on file. female who presents today for her annual well woman exam.  The patient is not sexually active. The patient has never been taking hormone replacement therapy. Patient denies post-menopausal vaginal bleeding., However patient was recently in the emergency room and some spotting was noted along with some changes in her UA she was treated for UTI    The patient has regular exercise: no    Vitals:  BP (!) 168/92   Ht 5' 2\" (1.575 m)   Wt 134 lb (60.8 kg)   BMI 24.51 kg/m²   Allergies: Other and Sulfa antibiotics  Past Medical History:   Diagnosis Date    Anxiety     Depression     Heart disease     Hypertension     Hypothyroidism      Past Surgical History:   Procedure Laterality Date    BREAST CYST ASPIRATION Right 1/17/14    U/S guided core bx of the right breast    HYSTERECTOMY      PACEMAKER INSERTION       History reviewed. No pertinent family history. Social History     Social History    Marital status:      Spouse name: N/A    Number of children: N/A    Years of education: N/A     Occupational History    Not on file. Social History Main Topics    Smoking status: Never Smoker    Smokeless tobacco: Never Used    Alcohol use No    Drug use: No    Sexual activity: No     Other Topics Concern    Not on file     Social History Narrative    No narrative on file       Last mammogram 2mos  Breast cancer risk factors : No family history  Last Pap remote past    No LMP recorded. Patient has had a hysterectomy.   Age at menopause onset: 30yrs  Menopausal symptom assessment: none  Urinary incontinence &  symptoms: Mixed incontinence however patient is stable  Sexual dysfunction: Not applicable  Present Hormonal medications: None noted    Osteoporosis risk assessment : Small body frame  Last bone mineral density : Within the last 2 years    History of abnormal lipids: yes - followed by  Hypertension followed by primary care  Stroke/MI followed by primary care    Yearly flu vaccine recommended for persons aged 48 and older. Colonoscopy up-to-date    Review of Systems  Review of Systems   Constitutional: Negative. Respiratory: Negative. Cardiovascular: Negative. Gastrointestinal: Negative. Genitourinary: Negative. Neurological: Negative. All other systems reviewed and are negative. Objective:     Vitals:  BP (!) 168/92   Ht 5' 2\" (1.575 m)   Wt 134 lb (60.8 kg)   BMI 24.51 kg/m²     Physical Exam  Physical Exam   Constitutional: She is oriented to person, place, and time. She appears well-developed and well-nourished. No distress. HENT:   Head: Normocephalic. Right Ear: External ear normal.   Left Ear: External ear normal.   Nose: Nose normal.   Eyes: Conjunctivae and EOM are normal. Pupils are equal, round, and reactive to light. Neck: No tracheal deviation present. No thyromegaly present. Cardiovascular: Normal rate, regular rhythm, normal heart sounds and intact distal pulses. Exam reveals no gallop and no friction rub. No murmur heard. Pulmonary/Chest: Effort normal and breath sounds normal. No respiratory distress. She has no wheezes. She has no rales. She exhibits no tenderness. Abdominal: Soft. Bowel sounds are normal. She exhibits no distension and no mass. There is no tenderness. There is no rebound and no guarding. Genitourinary: No breast swelling, tenderness, discharge or bleeding. There is no rash, tenderness or lesion on the right labia. There is no rash, tenderness or lesion on the left labia. Right adnexum displays no mass, no tenderness and no fullness. Left adnexum displays no mass, no tenderness and no fullness. No erythema, tenderness or bleeding in the vagina. Vaginal discharge found. Genitourinary Comments: Patient does have some paravaginal prolapse but cuff is well supported no bleeding noted   Musculoskeletal: She exhibits no edema. Lymphadenopathy:        Right: No inguinal adenopathy present. Left: No inguinal adenopathy present. Neurological: She is alert and oriented to person, place, and time. She displays normal reflexes. No cranial nerve deficit. Skin: Skin is warm and dry. She is not diaphoretic. Psychiatric: She has a normal mood and affect. Her behavior is normal. Judgment normal.       Assessment:     No diagnosis found. Body mass index is 24.51 kg/m². Obesity:  Unnderweight  Smoking:  No    Plan:   Pap : not-indicated hysterectomy cervix removed  Vaginal cultures obtained    Obesity Counseling:  N/A  Smoking Counseling:  N/A    No orders of the defined types were placed in this encounter. No orders of the defined types were placed in this encounter. Follow up:  No Follow-up on file. Ina Mcallister DO    HEALTH MAINTENANCE:   Health information given. Women's Health patient information and counselling done. Counseled regarding risk/benefits/alternatives to Hormone Therapy and need for yearly re-evaluation. Periodic Pap smear discussed. Mammogram recommended yearly. Colon cancer screening by age 48 & every 5-10 years. Bone mineral density (by age 72 or sooner if indication it would affect Hormone Therapy management or other risk factors for osteoporosis).

## 2017-12-11 ENCOUNTER — OFFICE VISIT (OUTPATIENT)
Dept: CARDIOLOGY | Age: 82
End: 2017-12-11

## 2017-12-11 VITALS
RESPIRATION RATE: 16 BRPM | HEART RATE: 63 BPM | BODY MASS INDEX: 24.27 KG/M2 | HEIGHT: 63 IN | OXYGEN SATURATION: 98 % | DIASTOLIC BLOOD PRESSURE: 92 MMHG | SYSTOLIC BLOOD PRESSURE: 162 MMHG | WEIGHT: 137 LBS | TEMPERATURE: 98 F

## 2017-12-11 DIAGNOSIS — Z95.5 HISTORY OF CORONARY ARTERY STENT PLACEMENT: ICD-10-CM

## 2017-12-11 DIAGNOSIS — Z95.0 STATUS POST PLACEMENT OF OTHER CARDIAC PACEMAKER: ICD-10-CM

## 2017-12-11 DIAGNOSIS — I49.5 SICK SINUS SYNDROME (HCC): Primary | ICD-10-CM

## 2017-12-11 DIAGNOSIS — I10 ESSENTIAL HYPERTENSION: Chronic | ICD-10-CM

## 2017-12-11 PROCEDURE — 99214 OFFICE O/P EST MOD 30 MIN: CPT | Performed by: INTERNAL MEDICINE

## 2017-12-11 RX ORDER — AMLODIPINE BESYLATE 10 MG/1
10 TABLET ORAL DAILY
Qty: 30 TABLET | Refills: 3 | Status: SHIPPED | OUTPATIENT
Start: 2017-12-11 | End: 2018-11-05 | Stop reason: SDUPTHER

## 2017-12-11 RX ORDER — PRAVASTATIN SODIUM 20 MG
1 TABLET ORAL NIGHTLY
Refills: 1 | Status: ON HOLD | COMMUNITY
Start: 2017-12-08 | End: 2018-10-10

## 2017-12-11 RX ORDER — POTASSIUM BICARBONATE 25 MEQ/1
25 TABLET, EFFERVESCENT ORAL DAILY
Qty: 60 TABLET | Refills: 5 | Status: SHIPPED | OUTPATIENT
Start: 2017-12-11 | End: 2019-01-02 | Stop reason: SDUPTHER

## 2017-12-11 ASSESSMENT — ENCOUNTER SYMPTOMS
STRIDOR: 0
EYES NEGATIVE: 1
NAUSEA: 0
WHEEZING: 0
RESPIRATORY NEGATIVE: 1
SHORTNESS OF BREATH: 0
COUGH: 0
BLOOD IN STOOL: 0
CHEST TIGHTNESS: 0
GASTROINTESTINAL NEGATIVE: 1

## 2017-12-11 NOTE — PROGRESS NOTES
Subsequent Progress Note  Patient: Seth Yen  YOB: 1932  MRN: [id number]    Chief Complaint:f/u pacer aned syncope  Chief Complaint   Patient presents with    Coronary Artery Disease     6 m f/u, Arlyne Mark pt    Hypertension   CAD PCI 2008 8/2017 echo 60-65    Subjective/HPI: doing well no cp some jaramillo no palptitations. No falls. EKG:SR  Past Medical History:   Diagnosis Date    Anxiety     Depression     Heart disease     Hypertension     Hypothyroidism        Past Surgical History:   Procedure Laterality Date    BREAST CYST ASPIRATION Right 1/17/14    U/S guided core bx of the right breast    HYSTERECTOMY      PACEMAKER INSERTION         No family history on file. Social History     Social History    Marital status:       Spouse name: N/A    Number of children: N/A    Years of education: N/A     Social History Main Topics    Smoking status: Never Smoker    Smokeless tobacco: Never Used    Alcohol use No    Drug use: No    Sexual activity: No     Other Topics Concern    None     Social History Narrative    None       Allergies   Allergen Reactions    Other      Nectarines    Sulfa Antibiotics Rash       Current Outpatient Prescriptions   Medication Sig Dispense Refill    pravastatin (PRAVACHOL) 20 MG tablet Take 1 tablet by mouth nightly  1    amLODIPine (NORVASC) 10 MG tablet Take 1 tablet by mouth daily 30 tablet 3    potassium bicarbonate (EFFER-K) 25 MEQ disintegrating tablet Take 1 tablet by mouth daily 60 tablet 5    clopidogrel (PLAVIX) 75 MG tablet Take 1 tablet by mouth daily 30 tablet 11    levothyroxine (SYNTHROID) 50 MCG tablet Take 1 tablet by mouth Daily 30 tablet 11    chlorthalidone (HYGROTON) 25 MG tablet Take 12.5 mg by mouth every other day      carvedilol (COREG) 12.5 MG tablet Take 12.5 mg by mouth 2 times daily       NITROSTAT 0.4 MG SL tablet place 1 tablet under the tongue if needed every 5 minutes for chest pain for 3 doses IF NO RELIEF Depression    Anxiety    Hypothyroidism    Hypertension    Heart disease    Osteoarthritis of shoulder    Syncope and collapse    Status post placement of other cardiac pacemaker    Sick sinus syndrome (HCC)    History of coronary artery stent placement       Assessment/Plan:    1. Essential hypertension  Unconrolled. Stop Atenolol and start Amldidine 10 qd    2. Sick sinus syndrome (HCC)  PACER    3. History of coronary artery stent placement  STAY ON MEDS  4. Status post placement of other cardiac pacemaker  INTERROGATE A SINSTRUCTED       Counseling:  Heart Healthy Lifestyle, Low Salt Diet, Take Precautions to Prevent Falls, Regular Exercise and Walk Daily    Return in about 4 months (around 4/11/2018) for Cardiovascular care. .      Electronically signed by Margot Armando MD on 12/11/2017 at 2:15 PM

## 2018-02-06 ENCOUNTER — HOSPITAL ENCOUNTER (OUTPATIENT)
Dept: CARDIOLOGY | Age: 83
Discharge: HOME | End: 2018-02-06
Payer: MEDICARE

## 2018-02-06 PROCEDURE — 93280 PM DEVICE PROGR EVAL DUAL: CPT

## 2018-02-28 ENCOUNTER — APPOINTMENT (OUTPATIENT)
Dept: GENERAL RADIOLOGY | Age: 83
End: 2018-02-28
Payer: MEDICARE

## 2018-02-28 ENCOUNTER — HOSPITAL ENCOUNTER (EMERGENCY)
Age: 83
Discharge: HOME | End: 2018-02-28
Payer: MEDICARE

## 2018-02-28 VITALS
BODY MASS INDEX: 23.22 KG/M2 | SYSTOLIC BLOOD PRESSURE: 177 MMHG | OXYGEN SATURATION: 96 % | DIASTOLIC BLOOD PRESSURE: 76 MMHG | WEIGHT: 136 LBS | TEMPERATURE: 97.4 F | RESPIRATION RATE: 18 BRPM | HEART RATE: 60 BPM | HEIGHT: 64 IN

## 2018-02-28 DIAGNOSIS — S43.004A CLOSED DISLOCATION OF RIGHT SHOULDER, INITIAL ENCOUNTER: Primary | ICD-10-CM

## 2018-02-28 PROCEDURE — 73030 X-RAY EXAM OF SHOULDER: CPT

## 2018-02-28 PROCEDURE — 2580000003 HC RX 258: Performed by: PHYSICIAN ASSISTANT

## 2018-02-28 PROCEDURE — 96376 TX/PRO/DX INJ SAME DRUG ADON: CPT

## 2018-02-28 PROCEDURE — 23655 CLTX SHO DSLC W/MNPJ W/ANES: CPT

## 2018-02-28 PROCEDURE — 99284 EMERGENCY DEPT VISIT MOD MDM: CPT

## 2018-02-28 PROCEDURE — 2500000003 HC RX 250 WO HCPCS: Performed by: PHYSICIAN ASSISTANT

## 2018-02-28 PROCEDURE — 96375 TX/PRO/DX INJ NEW DRUG ADDON: CPT

## 2018-02-28 PROCEDURE — 96374 THER/PROPH/DIAG INJ IV PUSH: CPT

## 2018-02-28 PROCEDURE — 6360000002 HC RX W HCPCS: Performed by: PHYSICIAN ASSISTANT

## 2018-02-28 RX ORDER — MORPHINE SULFATE 4 MG/ML
4 INJECTION, SOLUTION INTRAMUSCULAR; INTRAVENOUS ONCE
Status: COMPLETED | OUTPATIENT
Start: 2018-02-28 | End: 2018-02-28

## 2018-02-28 RX ORDER — ONDANSETRON 2 MG/ML
INJECTION INTRAMUSCULAR; INTRAVENOUS
Status: DISCONTINUED
Start: 2018-02-28 | End: 2018-02-28 | Stop reason: HOSPADM

## 2018-02-28 RX ORDER — 0.9 % SODIUM CHLORIDE 0.9 %
1000 INTRAVENOUS SOLUTION INTRAVENOUS ONCE
Status: COMPLETED | OUTPATIENT
Start: 2018-02-28 | End: 2018-02-28

## 2018-02-28 RX ORDER — ONDANSETRON 2 MG/ML
4 INJECTION INTRAMUSCULAR; INTRAVENOUS ONCE
Status: COMPLETED | OUTPATIENT
Start: 2018-02-28 | End: 2018-02-28

## 2018-02-28 RX ORDER — FENTANYL CITRATE 50 UG/ML
25 INJECTION, SOLUTION INTRAMUSCULAR; INTRAVENOUS ONCE
Status: COMPLETED | OUTPATIENT
Start: 2018-02-28 | End: 2018-02-28

## 2018-02-28 RX ORDER — ETOMIDATE 2 MG/ML
0.3 INJECTION INTRAVENOUS ONCE
Status: COMPLETED | OUTPATIENT
Start: 2018-02-28 | End: 2018-02-28

## 2018-02-28 RX ORDER — HYDROCODONE BITARTRATE AND ACETAMINOPHEN 5; 325 MG/1; MG/1
1 TABLET ORAL EVERY 6 HOURS PRN
Qty: 12 TABLET | Refills: 0 | Status: SHIPPED | OUTPATIENT
Start: 2018-02-28 | End: 2018-03-03

## 2018-02-28 RX ADMIN — ONDANSETRON 4 MG: 2 INJECTION INTRAMUSCULAR; INTRAVENOUS at 01:43

## 2018-02-28 RX ADMIN — SODIUM CHLORIDE 1000 ML: 9 INJECTION, SOLUTION INTRAVENOUS at 00:16

## 2018-02-28 RX ADMIN — FENTANYL CITRATE 25 MCG: 50 INJECTION, SOLUTION INTRAMUSCULAR; INTRAVENOUS at 00:44

## 2018-02-28 RX ADMIN — ETOMIDATE 10 MG: 2 INJECTION INTRAVENOUS at 00:47

## 2018-02-28 RX ADMIN — MORPHINE SULFATE 4 MG: 4 INJECTION, SOLUTION INTRAMUSCULAR; INTRAVENOUS at 00:16

## 2018-02-28 RX ADMIN — ONDANSETRON 4 MG: 2 INJECTION INTRAMUSCULAR; INTRAVENOUS at 00:16

## 2018-02-28 RX ADMIN — ONDANSETRON 4 MG: 2 INJECTION INTRAMUSCULAR; INTRAVENOUS at 00:44

## 2018-02-28 ASSESSMENT — ENCOUNTER SYMPTOMS
APNEA: 0
SHORTNESS OF BREATH: 0
ABDOMINAL PAIN: 0
ALLERGIC/IMMUNOLOGIC NEGATIVE: 1
COLOR CHANGE: 0
EYE PAIN: 0
TROUBLE SWALLOWING: 0

## 2018-02-28 ASSESSMENT — PAIN SCALES - GENERAL
PAINLEVEL_OUTOF10: 10

## 2018-02-28 ASSESSMENT — PAIN DESCRIPTION - LOCATION: LOCATION: SHOULDER

## 2018-02-28 ASSESSMENT — PAIN DESCRIPTION - ORIENTATION: ORIENTATION: RIGHT

## 2018-02-28 NOTE — ED PROVIDER NOTES
[address] [facility] ED  eMERGENCY dEPARTMENT eNCOUnter      Pt Name: Kamryn Paniagua  MRN: [id number]  [place]furt 7/8/1932  Date of evaluation: 2/28/2018  Provider: Chayo Myers PA-C    CHIEF COMPLAINT       Chief Complaint   Patient presents with    Shoulder Injury     RIGHT SIDE DUE TO FALL          HISTORY OF PRESENT ILLNESS  (Location/Symptom, Timing/Onset, Context/Setting, Quality, Duration, Modifying Factors, Severity.)   Kamryn Paniagua is a 80 y.o. female who presents to the emergency department With complaints of right sided shoulder pain status post mechanical fall prior to arrival.  Patient states that she lost her balance and fallen directly onto the right shoulder. Patient has a shoulder that appears to be anterior dislocated with anterior fullness. Patient denies any numbness or tingling. Patient did not hit her head or lose consciousness. Patient denies any neck pain, back pain, saddle paresthesias or loss of bowel or bladder control. Patient denies any pain or injury to the hips or lower extremities. Patient denies any injury to the abdomen or chest     HPI    Nursing Notes were reviewed and I agree. REVIEW OF SYSTEMS    (2-9 systems for level 4, 10 or more for level 5)     Review of Systems   Constitutional: Negative for diaphoresis and fever. HENT: Negative for hearing loss and trouble swallowing. Eyes: Negative for pain. Respiratory: Negative for apnea and shortness of breath. Cardiovascular: Negative for chest pain. Gastrointestinal: Negative for abdominal pain. Endocrine: Negative. Genitourinary: Negative for hematuria. Musculoskeletal: Positive for arthralgias and joint swelling. Negative for neck pain and neck stiffness. Skin: Negative for color change. Allergic/Immunologic: Negative. Neurological: Negative for dizziness and numbness. Hematological: Negative. Psychiatric/Behavioral: Negative. All other systems reviewed and are negative.        Except as activity: No     Other Topics Concern    None     Social History Narrative    None       SCREENINGS           PHYSICAL EXAM    (up to 7 for level 4, 8 or more for level 5)     ED Triage Vitals [02/28/18 0011]   BP Temp Temp Source Pulse Resp SpO2 Height Weight   (!) 202/95 97.4 °F (36.3 °C) Oral 60 17 98 % 5' 4\" (1.626 m) 136 lb (61.7 kg)       Physical Exam   Constitutional: She is oriented to person, place, and time. She appears well-developed and well-nourished. No distress. HENT:   Head: Normocephalic and atraumatic. Mouth/Throat: No oropharyngeal exudate. Eyes: Conjunctivae and EOM are normal. Pupils are equal, round, and reactive to light. No scleral icterus. Neck: Normal range of motion. Neck supple. No tracheal deviation present. Cardiovascular: Normal rate, normal heart sounds and intact distal pulses. Pulmonary/Chest: Effort normal and breath sounds normal. No respiratory distress. Abdominal: Soft. Bowel sounds are normal. She exhibits no distension. Musculoskeletal:        Right shoulder: She exhibits decreased range of motion, tenderness, bony tenderness, swelling, deformity, pain and spasm. Neurological: She is alert and oriented to person, place, and time. No cranial nerve deficit. She exhibits normal muscle tone. Skin: Skin is warm and dry. No rash noted. She is not diaphoretic. No erythema. Psychiatric: She has a normal mood and affect. Her behavior is normal. Judgment and thought content normal.   Nursing note and vitals reviewed.         DIAGNOSTIC RESULTS     RADIOLOGY:   Non-plain film images such as CT, Ultrasound and MRI are read by the radiologist. Plain radiographic images are visualized and preliminarily interpreted by Krystle Pryor PA-C with the below findings:    Pos ant dislocation    Interpretation per the Radiologist below, if available at the time of this note:    XR SHOULDER RIGHT (MIN 2 VIEWS)    (Results Pending)   XR SHOULDER RIGHT (MIN 2 VIEWS)    (Results Pending)       LABS:  Labs Reviewed - No data to display    All other labs were within normal range or not returned as of this dictation. EMERGENCY DEPARTMENT COURSE and DIFFERENTIAL DIAGNOSIS/MDM:   Vitals:    Vitals:    02/28/18 0048 02/28/18 0050 02/28/18 0052 02/28/18 0053   BP:  (!) 181/109 (!) 190/101 (!) 190/101   Pulse: 60 60 60 60   Resp: 16 20 12 12   Temp:       TempSrc:       SpO2: 100% 100% 100% 100%   Weight:       Height:           REASSESSMENT     ED Course        Patient presented with a anterior right shoulder dislocation status post mechanical fall. Patient had conscious sedation performed with reduction of shoulder dislocation without difficulty. X-ray confirmed placement and patient was discharged home with orthopedic follow-up. MDM    PROCEDURES:    Ortho Injury  Date/Time: 2/28/2018 12:50 AM  Performed by: Luciano Mckeon  Authorized by: Luciano Mckeon   Consent: Verbal consent obtained. Written consent obtained.   Risks and benefits: risks, benefits and alternatives were discussed  Consent given by: patient  Patient understanding: patient states understanding of the procedure being performed  Patient consent: the patient's understanding of the procedure matches consent given  Procedure consent: procedure consent matches procedure scheduled  Relevant documents: relevant documents present and verified  Site marked: the operative site was marked  Imaging studies: imaging studies available  Patient identity confirmed: verbally with patient, arm band and provided demographic data  Injury location: shoulder  Location details: right shoulder  Injury type: dislocation  Dislocation type: anterior  Hill-Sachs deformity: no  Chronicity: new  Pre-procedure neurovascular assessment: neurovascularly intact  Pre-procedure distal perfusion: normal  Pre-procedure neurological function: normal  Pre-procedure range of motion: reduced    Anesthesia:  Local anesthesia used: no    Sedation:  Patient sedated: recognition program.  Efforts were made to edit the dictations but occasionally words are mis-transcribed.)    JOHN Weber PA-C  02/28/18 6710

## 2018-04-11 ENCOUNTER — OFFICE VISIT (OUTPATIENT)
Dept: CARDIOLOGY CLINIC | Age: 83
End: 2018-04-11
Payer: MEDICARE

## 2018-04-11 VITALS
BODY MASS INDEX: 23.12 KG/M2 | SYSTOLIC BLOOD PRESSURE: 128 MMHG | HEART RATE: 66 BPM | WEIGHT: 135.4 LBS | RESPIRATION RATE: 16 BRPM | DIASTOLIC BLOOD PRESSURE: 72 MMHG | HEIGHT: 64 IN | OXYGEN SATURATION: 97 %

## 2018-04-11 DIAGNOSIS — Z95.5 HISTORY OF CORONARY ARTERY STENT PLACEMENT: ICD-10-CM

## 2018-04-11 DIAGNOSIS — Z95.0 STATUS POST PLACEMENT OF OTHER CARDIAC PACEMAKER: Primary | ICD-10-CM

## 2018-04-11 DIAGNOSIS — I49.5 SICK SINUS SYNDROME (HCC): ICD-10-CM

## 2018-04-11 DIAGNOSIS — I10 ESSENTIAL HYPERTENSION: ICD-10-CM

## 2018-04-11 DIAGNOSIS — I51.9 HEART DISEASE: ICD-10-CM

## 2018-04-11 PROCEDURE — 93000 ELECTROCARDIOGRAM COMPLETE: CPT | Performed by: INTERNAL MEDICINE

## 2018-04-11 PROCEDURE — 99214 OFFICE O/P EST MOD 30 MIN: CPT | Performed by: INTERNAL MEDICINE

## 2018-04-11 ASSESSMENT — ENCOUNTER SYMPTOMS
STRIDOR: 0
CHEST TIGHTNESS: 0
WHEEZING: 0
NAUSEA: 0
BLOOD IN STOOL: 0
RESPIRATORY NEGATIVE: 1
SHORTNESS OF BREATH: 0
GASTROINTESTINAL NEGATIVE: 1
EYES NEGATIVE: 1
COUGH: 0

## 2018-05-08 ENCOUNTER — HOSPITAL ENCOUNTER (OUTPATIENT)
Dept: CARDIOLOGY | Age: 83
Discharge: HOME | End: 2018-05-08
Payer: MEDICARE

## 2018-05-08 PROCEDURE — 93296 REM INTERROG EVL PM/IDS: CPT

## 2018-08-07 ENCOUNTER — HOSPITAL ENCOUNTER (OUTPATIENT)
Dept: CARDIOLOGY | Age: 83
Discharge: HOME | End: 2018-08-07
Payer: MEDICARE

## 2018-08-07 PROCEDURE — 93280 PM DEVICE PROGR EVAL DUAL: CPT

## 2018-10-05 ENCOUNTER — OFFICE VISIT (OUTPATIENT)
Dept: CARDIOLOGY CLINIC | Age: 83
End: 2018-10-05
Payer: MEDICARE

## 2018-10-05 VITALS
BODY MASS INDEX: 23.73 KG/M2 | DIASTOLIC BLOOD PRESSURE: 82 MMHG | WEIGHT: 139 LBS | SYSTOLIC BLOOD PRESSURE: 162 MMHG | RESPIRATION RATE: 16 BRPM | HEART RATE: 68 BPM | OXYGEN SATURATION: 98 % | HEIGHT: 64 IN

## 2018-10-05 DIAGNOSIS — I10 ESSENTIAL HYPERTENSION: Chronic | ICD-10-CM

## 2018-10-05 DIAGNOSIS — R55 SYNCOPE AND COLLAPSE: ICD-10-CM

## 2018-10-05 DIAGNOSIS — I49.5 SICK SINUS SYNDROME (HCC): ICD-10-CM

## 2018-10-05 DIAGNOSIS — I51.9 HEART DISEASE: ICD-10-CM

## 2018-10-05 DIAGNOSIS — Z95.5 HISTORY OF CORONARY ARTERY STENT PLACEMENT: Primary | ICD-10-CM

## 2018-10-05 DIAGNOSIS — R06.09 DOE (DYSPNEA ON EXERTION): ICD-10-CM

## 2018-10-05 LAB
ANION GAP SERPL CALCULATED.3IONS-SCNC: 15 MEQ/L (ref 7–13)
BUN BLDV-MCNC: 29 MG/DL (ref 8–23)
CALCIUM SERPL-MCNC: 9.5 MG/DL (ref 8.6–10.2)
CHLORIDE BLD-SCNC: 97 MEQ/L (ref 98–107)
CO2: 26 MEQ/L (ref 22–29)
CREAT SERPL-MCNC: 1.54 MG/DL (ref 0.5–0.9)
GFR AFRICAN AMERICAN: 38.6
GFR NON-AFRICAN AMERICAN: 31.9
GLUCOSE BLD-MCNC: 94 MG/DL (ref 74–109)
HCT VFR BLD CALC: 38.5 % (ref 37–47)
HEMOGLOBIN: 12.9 G/DL (ref 12–16)
MCH RBC QN AUTO: 32.1 PG (ref 27–31.3)
MCHC RBC AUTO-ENTMCNC: 33.5 % (ref 33–37)
MCV RBC AUTO: 95.8 FL (ref 82–100)
PDW BLD-RTO: 15.3 % (ref 11.5–14.5)
PLATELET # BLD: 157 K/UL (ref 130–400)
POTASSIUM SERPL-SCNC: 4.2 MEQ/L (ref 3.5–5.1)
RBC # BLD: 4.02 M/UL (ref 4.2–5.4)
SODIUM BLD-SCNC: 138 MEQ/L (ref 132–144)
WBC # BLD: 5.2 K/UL (ref 4.8–10.8)

## 2018-10-05 PROCEDURE — 99215 OFFICE O/P EST HI 40 MIN: CPT | Performed by: INTERNAL MEDICINE

## 2018-10-05 ASSESSMENT — ENCOUNTER SYMPTOMS
WHEEZING: 0
EYES NEGATIVE: 1
CHEST TIGHTNESS: 0
SHORTNESS OF BREATH: 1
BLOOD IN STOOL: 0
STRIDOR: 0
COUGH: 0
GASTROINTESTINAL NEGATIVE: 1
NAUSEA: 0

## 2018-10-07 ENCOUNTER — HOSPITAL ENCOUNTER (INPATIENT)
Age: 83
LOS: 5 days | Discharge: HOME | DRG: 246 | End: 2018-10-12
Attending: FAMILY MEDICINE | Admitting: INTERNAL MEDICINE
Payer: MEDICARE

## 2018-10-07 ENCOUNTER — APPOINTMENT (OUTPATIENT)
Dept: GENERAL RADIOLOGY | Age: 83
DRG: 246 | End: 2018-10-07
Payer: MEDICARE

## 2018-10-07 ENCOUNTER — APPOINTMENT (OUTPATIENT)
Dept: CT IMAGING | Age: 83
DRG: 246 | End: 2018-10-07
Payer: MEDICARE

## 2018-10-07 DIAGNOSIS — R55 SYNCOPE AND COLLAPSE: Primary | ICD-10-CM

## 2018-10-07 DIAGNOSIS — N18.30 CKD (CHRONIC KIDNEY DISEASE), STAGE III (HCC): ICD-10-CM

## 2018-10-07 DIAGNOSIS — N39.0 ACUTE UTI: ICD-10-CM

## 2018-10-07 DIAGNOSIS — N17.9 AKI (ACUTE KIDNEY INJURY) (HCC): ICD-10-CM

## 2018-10-07 LAB
ALBUMIN SERPL-MCNC: 4 G/DL (ref 3.9–4.9)
ALP BLD-CCNC: 62 U/L (ref 40–130)
ALT SERPL-CCNC: 12 U/L (ref 0–33)
AMPHETAMINE SCREEN, URINE: NORMAL
AMYLASE: 176 U/L (ref 28–100)
ANION GAP SERPL CALCULATED.3IONS-SCNC: 11 MEQ/L (ref 7–13)
AST SERPL-CCNC: 24 U/L (ref 0–35)
BACTERIA: ABNORMAL /HPF
BARBITURATE SCREEN URINE: NORMAL
BASOPHILS ABSOLUTE: 0 K/UL (ref 0–0.2)
BASOPHILS RELATIVE PERCENT: 0.7 %
BENZODIAZEPINE SCREEN, URINE: NORMAL
BILIRUB SERPL-MCNC: 0.4 MG/DL (ref 0–1.2)
BILIRUBIN URINE: NEGATIVE
BLOOD, URINE: NEGATIVE
BUN BLDV-MCNC: 30 MG/DL (ref 8–23)
CALCIUM SERPL-MCNC: 9 MG/DL (ref 8.6–10.2)
CANNABINOID SCREEN URINE: NORMAL
CHLORIDE BLD-SCNC: 97 MEQ/L (ref 98–107)
CLARITY: ABNORMAL
CO2: 28 MEQ/L (ref 22–29)
COCAINE METABOLITE SCREEN URINE: NORMAL
COLOR: YELLOW
CREAT SERPL-MCNC: 1.7 MG/DL (ref 0.5–0.9)
EOSINOPHILS ABSOLUTE: 0.1 K/UL (ref 0–0.7)
EOSINOPHILS RELATIVE PERCENT: 3 %
EPITHELIAL CELLS, UA: ABNORMAL /HPF
ETHANOL PERCENT: NORMAL G/DL
ETHANOL: <10 MG/DL (ref 0–0.08)
GFR AFRICAN AMERICAN: 34.5
GFR NON-AFRICAN AMERICAN: 28.5
GLOBULIN: 3.5 G/DL (ref 2.3–3.5)
GLUCOSE BLD-MCNC: 117 MG/DL (ref 74–109)
GLUCOSE URINE: NEGATIVE MG/DL
HCT VFR BLD CALC: 33.3 % (ref 37–47)
HEMOGLOBIN: 11.1 G/DL (ref 12–16)
INR BLD: 1
KETONES, URINE: NEGATIVE MG/DL
LEUKOCYTE ESTERASE, URINE: ABNORMAL
LIPASE: 80 U/L (ref 13–60)
LYMPHOCYTES ABSOLUTE: 1.2 K/UL (ref 1–4.8)
LYMPHOCYTES RELATIVE PERCENT: 27.9 %
Lab: NORMAL
MCH RBC QN AUTO: 31.1 PG (ref 27–31.3)
MCHC RBC AUTO-ENTMCNC: 33.3 % (ref 33–37)
MCV RBC AUTO: 93.3 FL (ref 82–100)
MONOCYTES ABSOLUTE: 0.5 K/UL (ref 0.2–0.8)
MONOCYTES RELATIVE PERCENT: 11.4 %
NEUTROPHILS ABSOLUTE: 2.5 K/UL (ref 1.4–6.5)
NEUTROPHILS RELATIVE PERCENT: 57 %
NITRITE, URINE: NEGATIVE
OPIATE SCREEN URINE: NORMAL
PDW BLD-RTO: 14.7 % (ref 11.5–14.5)
PH UA: 7.5 (ref 5–9)
PHENCYCLIDINE SCREEN URINE: NORMAL
PLATELET # BLD: 135 K/UL (ref 130–400)
POTASSIUM REFLEX MAGNESIUM: 3.9 MEQ/L (ref 3.5–5.1)
PRO-BNP: 219 PG/ML
PROTEIN UA: ABNORMAL MG/DL
PROTHROMBIN TIME: 10.6 SEC (ref 9.6–12.3)
RBC # BLD: 3.58 M/UL (ref 4.2–5.4)
RBC UA: ABNORMAL /HPF (ref 0–2)
SODIUM BLD-SCNC: 136 MEQ/L (ref 132–144)
SPECIFIC GRAVITY UA: 1.01 (ref 1–1.03)
TOTAL PROTEIN: 7.5 G/DL (ref 6.4–8.1)
TROPONIN: <0.01 NG/ML (ref 0–0.01)
TSH SERPL DL<=0.05 MIU/L-ACNC: 1.64 UIU/ML (ref 0.27–4.2)
UROBILINOGEN, URINE: 1 E.U./DL
WBC # BLD: 4.4 K/UL (ref 4.8–10.8)
WBC UA: ABNORMAL /HPF (ref 0–5)

## 2018-10-07 PROCEDURE — 87040 BLOOD CULTURE FOR BACTERIA: CPT

## 2018-10-07 PROCEDURE — 87086 URINE CULTURE/COLONY COUNT: CPT

## 2018-10-07 PROCEDURE — 70450 CT HEAD/BRAIN W/O DYE: CPT

## 2018-10-07 PROCEDURE — 80307 DRUG TEST PRSMV CHEM ANLYZR: CPT

## 2018-10-07 PROCEDURE — 83880 ASSAY OF NATRIURETIC PEPTIDE: CPT

## 2018-10-07 PROCEDURE — 80053 COMPREHEN METABOLIC PANEL: CPT

## 2018-10-07 PROCEDURE — 82150 ASSAY OF AMYLASE: CPT

## 2018-10-07 PROCEDURE — 2500000003 HC RX 250 WO HCPCS: Performed by: FAMILY MEDICINE

## 2018-10-07 PROCEDURE — 36415 COLL VENOUS BLD VENIPUNCTURE: CPT

## 2018-10-07 PROCEDURE — 84443 ASSAY THYROID STIM HORMONE: CPT

## 2018-10-07 PROCEDURE — 81001 URINALYSIS AUTO W/SCOPE: CPT

## 2018-10-07 PROCEDURE — 2580000003 HC RX 258: Performed by: FAMILY MEDICINE

## 2018-10-07 PROCEDURE — 84484 ASSAY OF TROPONIN QUANT: CPT

## 2018-10-07 PROCEDURE — 6370000000 HC RX 637 (ALT 250 FOR IP): Performed by: FAMILY MEDICINE

## 2018-10-07 PROCEDURE — 71045 X-RAY EXAM CHEST 1 VIEW: CPT

## 2018-10-07 PROCEDURE — G0480 DRUG TEST DEF 1-7 CLASSES: HCPCS

## 2018-10-07 PROCEDURE — 99285 EMERGENCY DEPT VISIT HI MDM: CPT

## 2018-10-07 PROCEDURE — 96375 TX/PRO/DX INJ NEW DRUG ADDON: CPT

## 2018-10-07 PROCEDURE — 93005 ELECTROCARDIOGRAM TRACING: CPT

## 2018-10-07 PROCEDURE — 83690 ASSAY OF LIPASE: CPT

## 2018-10-07 PROCEDURE — 85025 COMPLETE CBC W/AUTO DIFF WBC: CPT

## 2018-10-07 PROCEDURE — 6360000002 HC RX W HCPCS: Performed by: FAMILY MEDICINE

## 2018-10-07 PROCEDURE — 6370000000 HC RX 637 (ALT 250 FOR IP): Performed by: INTERNAL MEDICINE

## 2018-10-07 PROCEDURE — 6360000002 HC RX W HCPCS: Performed by: INTERNAL MEDICINE

## 2018-10-07 PROCEDURE — 96366 THER/PROPH/DIAG IV INF ADDON: CPT

## 2018-10-07 PROCEDURE — 85610 PROTHROMBIN TIME: CPT

## 2018-10-07 PROCEDURE — 2580000003 HC RX 258: Performed by: INTERNAL MEDICINE

## 2018-10-07 PROCEDURE — 2060000000 HC ICU INTERMEDIATE R&B

## 2018-10-07 PROCEDURE — 96365 THER/PROPH/DIAG IV INF INIT: CPT

## 2018-10-07 RX ORDER — CHLORTHALIDONE 25 MG/1
12.5 TABLET ORAL EVERY OTHER DAY
Status: DISCONTINUED | OUTPATIENT
Start: 2018-10-07 | End: 2018-10-08

## 2018-10-07 RX ORDER — NITROGLYCERIN 0.4 MG/1
0.4 TABLET SUBLINGUAL EVERY 5 MIN PRN
Status: DISCONTINUED | OUTPATIENT
Start: 2018-10-07 | End: 2018-10-12 | Stop reason: HOSPADM

## 2018-10-07 RX ORDER — METOPROLOL TARTRATE 5 MG/5ML
5 INJECTION INTRAVENOUS ONCE
Status: DISCONTINUED | OUTPATIENT
Start: 2018-10-07 | End: 2018-10-07

## 2018-10-07 RX ORDER — POTASSIUM BICARBONATE 25 MEQ/1
25 TABLET, EFFERVESCENT ORAL DAILY
Status: DISCONTINUED | OUTPATIENT
Start: 2018-10-07 | End: 2018-10-08

## 2018-10-07 RX ORDER — METOPROLOL TARTRATE 5 MG/5ML
5 INJECTION INTRAVENOUS ONCE
Status: COMPLETED | OUTPATIENT
Start: 2018-10-07 | End: 2018-10-07

## 2018-10-07 RX ORDER — ASPIRIN 81 MG/1
81 TABLET, CHEWABLE ORAL DAILY
Status: DISCONTINUED | OUTPATIENT
Start: 2018-10-08 | End: 2018-10-12 | Stop reason: HOSPADM

## 2018-10-07 RX ORDER — LISINOPRIL 20 MG/1
20 TABLET ORAL DAILY
Status: DISCONTINUED | OUTPATIENT
Start: 2018-10-07 | End: 2018-10-08

## 2018-10-07 RX ORDER — SODIUM CHLORIDE 0.9 % (FLUSH) 0.9 %
10 SYRINGE (ML) INJECTION PRN
Status: DISCONTINUED | OUTPATIENT
Start: 2018-10-07 | End: 2018-10-12 | Stop reason: HOSPADM

## 2018-10-07 RX ORDER — PRAVASTATIN SODIUM 20 MG
20 TABLET ORAL NIGHTLY
Status: DISCONTINUED | OUTPATIENT
Start: 2018-10-07 | End: 2018-10-12 | Stop reason: HOSPADM

## 2018-10-07 RX ORDER — ENALAPRILAT 2.5 MG/2ML
1.25 INJECTION INTRAVENOUS ONCE
Status: COMPLETED | OUTPATIENT
Start: 2018-10-07 | End: 2018-10-07

## 2018-10-07 RX ORDER — ACETAMINOPHEN 325 MG/1
650 TABLET ORAL EVERY 4 HOURS PRN
Status: DISCONTINUED | OUTPATIENT
Start: 2018-10-07 | End: 2018-10-12 | Stop reason: HOSPADM

## 2018-10-07 RX ORDER — ISOSORBIDE MONONITRATE 30 MG/1
30 TABLET, EXTENDED RELEASE ORAL DAILY
Status: DISCONTINUED | OUTPATIENT
Start: 2018-10-07 | End: 2018-10-08

## 2018-10-07 RX ORDER — ONDANSETRON 2 MG/ML
4 INJECTION INTRAMUSCULAR; INTRAVENOUS EVERY 6 HOURS PRN
Status: DISCONTINUED | OUTPATIENT
Start: 2018-10-07 | End: 2018-10-12 | Stop reason: HOSPADM

## 2018-10-07 RX ORDER — HYDRALAZINE HYDROCHLORIDE 20 MG/ML
10 INJECTION INTRAMUSCULAR; INTRAVENOUS EVERY 6 HOURS PRN
Status: DISCONTINUED | OUTPATIENT
Start: 2018-10-07 | End: 2018-10-12 | Stop reason: HOSPADM

## 2018-10-07 RX ORDER — LABETALOL HYDROCHLORIDE 5 MG/ML
20 INJECTION, SOLUTION INTRAVENOUS EVERY 4 HOURS
Status: DISCONTINUED | OUTPATIENT
Start: 2018-10-07 | End: 2018-10-08

## 2018-10-07 RX ORDER — CARVEDILOL 12.5 MG/1
12.5 TABLET ORAL 2 TIMES DAILY
Status: DISCONTINUED | OUTPATIENT
Start: 2018-10-07 | End: 2018-10-12 | Stop reason: HOSPADM

## 2018-10-07 RX ORDER — LEVOTHYROXINE SODIUM 0.05 MG/1
50 TABLET ORAL DAILY
Status: DISCONTINUED | OUTPATIENT
Start: 2018-10-08 | End: 2018-10-12 | Stop reason: HOSPADM

## 2018-10-07 RX ORDER — AMLODIPINE BESYLATE 10 MG/1
10 TABLET ORAL DAILY
Status: DISCONTINUED | OUTPATIENT
Start: 2018-10-08 | End: 2018-10-12 | Stop reason: HOSPADM

## 2018-10-07 RX ORDER — SODIUM CHLORIDE 0.9 % (FLUSH) 0.9 %
10 SYRINGE (ML) INJECTION EVERY 12 HOURS SCHEDULED
Status: DISCONTINUED | OUTPATIENT
Start: 2018-10-07 | End: 2018-10-12 | Stop reason: HOSPADM

## 2018-10-07 RX ORDER — CLOPIDOGREL BISULFATE 75 MG/1
75 TABLET ORAL DAILY
Status: DISCONTINUED | OUTPATIENT
Start: 2018-10-07 | End: 2018-10-12 | Stop reason: HOSPADM

## 2018-10-07 RX ORDER — AMLODIPINE BESYLATE 5 MG/1
10 TABLET ORAL DAILY
Status: DISCONTINUED | OUTPATIENT
Start: 2018-10-07 | End: 2018-10-08

## 2018-10-07 RX ORDER — LORAZEPAM 1 MG/1
1 TABLET ORAL EVERY 6 HOURS PRN
Status: DISCONTINUED | OUTPATIENT
Start: 2018-10-07 | End: 2018-10-12 | Stop reason: HOSPADM

## 2018-10-07 RX ADMIN — SERTRALINE HYDROCHLORIDE 50 MG: 50 TABLET ORAL at 20:52

## 2018-10-07 RX ADMIN — HYDRALAZINE HYDROCHLORIDE 10 MG: 20 INJECTION INTRAMUSCULAR; INTRAVENOUS at 20:53

## 2018-10-07 RX ADMIN — ACETAMINOPHEN 650 MG: 325 TABLET ORAL at 23:01

## 2018-10-07 RX ADMIN — CEFTRIAXONE SODIUM 1 G: 1 INJECTION, POWDER, FOR SOLUTION INTRAMUSCULAR; INTRAVENOUS at 14:01

## 2018-10-07 RX ADMIN — PRAVASTATIN SODIUM 20 MG: 20 TABLET ORAL at 20:52

## 2018-10-07 RX ADMIN — CARVEDILOL 12.5 MG: 12.5 TABLET, FILM COATED ORAL at 20:52

## 2018-10-07 RX ADMIN — LORAZEPAM 1 MG: 1 TABLET ORAL at 23:02

## 2018-10-07 RX ADMIN — ISOSORBIDE MONONITRATE 30 MG: 30 TABLET, EXTENDED RELEASE ORAL at 19:21

## 2018-10-07 RX ADMIN — ONDANSETRON 4 MG: 2 INJECTION INTRAMUSCULAR; INTRAVENOUS at 23:01

## 2018-10-07 RX ADMIN — LISINOPRIL 20 MG: 20 TABLET ORAL at 19:21

## 2018-10-07 RX ADMIN — POTASSIUM BICARBONATE 25 MEQ: 25 TABLET, EFFERVESCENT ORAL at 20:52

## 2018-10-07 RX ADMIN — ENALAPRILAT 1.25 MG: 1.25 INJECTION INTRAVENOUS at 15:35

## 2018-10-07 RX ADMIN — METOPROLOL TARTRATE 5 MG: 1 INJECTION, SOLUTION INTRAVENOUS at 14:19

## 2018-10-07 RX ADMIN — Medication 10 ML: at 20:53

## 2018-10-07 RX ADMIN — AMLODIPINE BESYLATE 10 MG: 5 TABLET ORAL at 16:14

## 2018-10-07 RX ADMIN — CLOPIDOGREL BISULFATE 75 MG: 75 TABLET ORAL at 19:21

## 2018-10-07 RX ADMIN — CHLORTHALIDONE 12.5 MG: 25 TABLET ORAL at 19:21

## 2018-10-07 ASSESSMENT — PAIN SCALES - GENERAL
PAINLEVEL_OUTOF10: 0
PAINLEVEL_OUTOF10: 5

## 2018-10-07 ASSESSMENT — ENCOUNTER SYMPTOMS
GASTROINTESTINAL NEGATIVE: 1
EYES NEGATIVE: 1
RESPIRATORY NEGATIVE: 1

## 2018-10-07 NOTE — ED PROVIDER NOTES
[facility] [facility] ED  eMERGENCY dEPARTMENT eNCOUnter      Pt Name: Markus Lucas  MRN: [id number]  [place] 7/8/1932  Date of evaluation: 10/7/2018  Provider: Amy Hurtado MD    [address] [place] [address]       Chief Complaint   Patient presents with    Loss of Consciousness          Fatigue     2 weeks         HISTORY OF PRESENT ILLNESS   (Location/Symptom, Timing/Onset, Context/Setting, Quality, Duration, Modifying Factors, Severity)  Note limiting factors. Markus Lucas is a 80 y.o. female who presents to the emergency department     80years old with known coronary artery disease CHF status post AICD presented to the ER with a possible syncopal episode this morning states was sitting on her kitchen table and then daughter found her with her head down and took a few minutes to wake her back up. Patient states she have been having weakness and fatigue in the last couple of weeks progressively getting worse. Patient is currently                 Nursing Notes were reviewed. REVIEW OF SYSTEMS    (2-9 systems for level 4, 10 or more for level 5)     Review of Systems   Constitutional: Positive for activity change and appetite change. HENT: Negative. Eyes: Negative. Respiratory: Negative. Cardiovascular: Positive for leg swelling. Gastrointestinal: Negative. Genitourinary: Negative. Musculoskeletal: Negative. Neurological: Positive for syncope and light-headedness. Negative for dizziness, tremors, seizures, speech difficulty, numbness and headaches. Hematological: Negative. Psychiatric/Behavioral: Negative. Except as noted above the remainder of the review of systems was reviewed and negative.        PAST MEDICAL HISTORY     Past Medical History:   Diagnosis Date    Anxiety     Depression     Heart disease     Hypertension     Hypothyroidism          SURGICAL HISTORY       Past Surgical History:   Procedure Laterality Date    BREAST CYST ASPIRATION Right 1/17/14    U/S guided that was unwitnessed but lasted for a few minutes . Patient labs and EKG showed an new UTI patient have been under the care of Dr. Torri Landeros for syncope and weakness and is scheduled for cardiac catheterization with possible PTCA stent placement this Tuesday at 11:00. Due to patient complicated history and weakness she will be admitted for UTI syncope under the hospital service and antibiotic was initiated in the ER       Amount and/or Complexity of Data Reviewed  Clinical lab tests: ordered and reviewed  Tests in the radiology section of CPT®: ordered and reviewed        CONSULTS:  IP CONSULT TO CARDIOLOGY    PROCEDURES:  Unless otherwise noted below, none     Procedures    FINAL IMPRESSION      1. Syncope and collapse    2. Acute UTI          DISPOSITION/PLAN   DISPOSITION Decision To Admit 10/07/2018 02:43:58 PM      PATIENT REFERRED TO:  No follow-up provider specified.     DISCHARGE MEDICATIONS:  New Prescriptions    No medications on file          (Please note that portions of this note were completed with a voice recognition program.  Efforts were made to edit the dictations but occasionally words are mis-transcribed.)    Riccardo Son MD (electronically signed)  Attending Emergency Physician          Eleazar Diaz MD  10/07/18 [facility] MD [place]  10/07/18 [id number] Kevan Son MD  10/07/18 [id number]

## 2018-10-07 NOTE — H&P
tablet Take 1 tablet by mouth Daily 10/13/17  Yes Toma Valadez MD   chlorthalidone (HYGROTON) 25 MG tablet Take 12.5 mg by mouth every other day 5/24/17  Yes Historical Provider, MD   carvedilol (COREG) 12.5 MG tablet Take 12.5 mg by mouth 2 times daily  5/24/17  Yes Historical Provider, MD   lisinopril (PRINIVIL;ZESTRIL) 20 MG tablet Take 20 mg by mouth daily. Yes Historical Provider, MD   sertraline (ZOLOFT) 50 MG tablet Take 50 mg by mouth daily. Yes Historical Provider, MD   amLODIPine (NORVASC) 10 MG tablet Take 1 tablet by mouth daily 12/11/17   Yash Solomon MD   NITROSTAT 0.4 MG SL tablet place 1 tablet under the tongue if needed every 5 minutes for chest pain for 3 doses IF NO RELIEF AFTER 3RD DOSE CALL PRESCRIBER . 8/7/17   Toma Valadez MD   isosorbide mononitrate (IMDUR) 30 MG CR tablet Take 30 mg by mouth daily. Historical Provider, MD   LORazepam (ATIVAN) 1 MG tablet Take 1 mg by mouth every 6 hours as needed. Historical Provider, MD       Allergies: Other and Sulfa antibiotics    Social History:   TOBACCO:   reports that she has never smoked. She has never used smokeless tobacco.  ETOH:   reports that she does not drink alcohol. Family History:   History reviewed. No pertinent family history. REVIEW OF SYSTEMS:  Ten systems reviewed and negative except for stated in HPI    Physical Exam:    Vitals: BP (!) 216/89   Pulse 60   Temp 98.4 °F (36.9 °C) (Oral)   Resp 16   Ht 5' 4\" (1.626 m)   Wt 139 lb (63 kg)   SpO2 100%   BMI 23.86 kg/m²   General appearance: alert, appears stated age and cooperative, moderate acute distress  Skin: Skin color, texture, turgor normal. No rashes or lesions  HEENT: Head: Normocephalic, no lesions, without obvious abnormality. Eye: Normal external eye, conjunctiva, lids cornea, BIJU.   Neck: supple, symmetrical, trachea midline  Lungs: clear to auscultation bilaterally  Heart: regular rate and rhythm, S1, S2 normal, no murmur, click, rub or w/u in ED was negative  - ECG showed paced rhythm  - serial troponins  - telemetry  - interrogate pacemaker  - cardiology consult    Hypertensive urgency  - SBP>200 despite administration of IV lopressor and Vasotec by ED physician  - CT head was negative  - resume home meds  - IV hydralazine and labetalol PRN  - monitor BP closely    CAD s/p PCI to LAD in 2006  - scheduled for cardiac cath on Tuesday, 10/9  - resume home meds     UTI  - continue IV ceftriaxone  - follow culture     CKD 3  - stable  - monitor renal function        Garcia Wei MD  Admitting Hospitalist

## 2018-10-08 ENCOUNTER — APPOINTMENT (OUTPATIENT)
Dept: ULTRASOUND IMAGING | Age: 83
DRG: 246 | End: 2018-10-08
Payer: MEDICARE

## 2018-10-08 LAB
ANION GAP SERPL CALCULATED.3IONS-SCNC: 13 MEQ/L (ref 7–13)
BUN BLDV-MCNC: 33 MG/DL (ref 8–23)
CALCIUM SERPL-MCNC: 8.8 MG/DL (ref 8.6–10.2)
CHLORIDE BLD-SCNC: 98 MEQ/L (ref 98–107)
CO2: 26 MEQ/L (ref 22–29)
CREAT SERPL-MCNC: 2.2 MG/DL (ref 0.5–0.9)
CREATININE URINE: 142.6 MG/DL
GFR AFRICAN AMERICAN: 25.6
GFR NON-AFRICAN AMERICAN: 21.2
GLUCOSE BLD-MCNC: 114 MG/DL (ref 74–109)
HCT VFR BLD CALC: 33.8 % (ref 37–47)
HEMOGLOBIN: 11.3 G/DL (ref 12–16)
MCH RBC QN AUTO: 31.6 PG (ref 27–31.3)
MCHC RBC AUTO-ENTMCNC: 33.4 % (ref 33–37)
MCV RBC AUTO: 94.5 FL (ref 82–100)
PDW BLD-RTO: 15.3 % (ref 11.5–14.5)
PLATELET # BLD: 142 K/UL (ref 130–400)
POTASSIUM REFLEX MAGNESIUM: 5.1 MEQ/L (ref 3.5–5.1)
RBC # BLD: 3.58 M/UL (ref 4.2–5.4)
SODIUM BLD-SCNC: 137 MEQ/L (ref 132–144)
SODIUM URINE: 20 MEQ/L
WBC # BLD: 5.8 K/UL (ref 4.8–10.8)

## 2018-10-08 PROCEDURE — 36415 COLL VENOUS BLD VENIPUNCTURE: CPT

## 2018-10-08 PROCEDURE — 85027 COMPLETE CBC AUTOMATED: CPT

## 2018-10-08 PROCEDURE — 2060000000 HC ICU INTERMEDIATE R&B

## 2018-10-08 PROCEDURE — 2700000000 HC OXYGEN THERAPY PER DAY

## 2018-10-08 PROCEDURE — 76775 US EXAM ABDO BACK WALL LIM: CPT

## 2018-10-08 PROCEDURE — G8979 MOBILITY GOAL STATUS: HCPCS

## 2018-10-08 PROCEDURE — G8978 MOBILITY CURRENT STATUS: HCPCS

## 2018-10-08 PROCEDURE — 93880 EXTRACRANIAL BILAT STUDY: CPT | Performed by: INTERNAL MEDICINE

## 2018-10-08 PROCEDURE — 97116 GAIT TRAINING THERAPY: CPT

## 2018-10-08 PROCEDURE — 93880 EXTRACRANIAL BILAT STUDY: CPT

## 2018-10-08 PROCEDURE — 84300 ASSAY OF URINE SODIUM: CPT

## 2018-10-08 PROCEDURE — 6370000000 HC RX 637 (ALT 250 FOR IP): Performed by: INTERNAL MEDICINE

## 2018-10-08 PROCEDURE — 97162 PT EVAL MOD COMPLEX 30 MIN: CPT

## 2018-10-08 PROCEDURE — 2580000003 HC RX 258: Performed by: INTERNAL MEDICINE

## 2018-10-08 PROCEDURE — 99223 1ST HOSP IP/OBS HIGH 75: CPT | Performed by: INTERNAL MEDICINE

## 2018-10-08 PROCEDURE — 6360000002 HC RX W HCPCS: Performed by: INTERNAL MEDICINE

## 2018-10-08 PROCEDURE — 80048 BASIC METABOLIC PNL TOTAL CA: CPT

## 2018-10-08 PROCEDURE — 82570 ASSAY OF URINE CREATININE: CPT

## 2018-10-08 RX ORDER — ISOSORBIDE MONONITRATE 60 MG/1
60 TABLET, EXTENDED RELEASE ORAL DAILY
Status: DISCONTINUED | OUTPATIENT
Start: 2018-10-08 | End: 2018-10-12 | Stop reason: HOSPADM

## 2018-10-08 RX ORDER — SODIUM CHLORIDE 450 MG/100ML
INJECTION, SOLUTION INTRAVENOUS
Status: DISCONTINUED
Start: 2018-10-08 | End: 2018-10-08 | Stop reason: WASHOUT

## 2018-10-08 RX ORDER — LABETALOL HYDROCHLORIDE 5 MG/ML
20 INJECTION, SOLUTION INTRAVENOUS EVERY 6 HOURS PRN
Status: DISCONTINUED | OUTPATIENT
Start: 2018-10-08 | End: 2018-10-12 | Stop reason: HOSPADM

## 2018-10-08 RX ORDER — SODIUM CHLORIDE 9 MG/ML
INJECTION, SOLUTION INTRAVENOUS CONTINUOUS
Status: DISCONTINUED | OUTPATIENT
Start: 2018-10-08 | End: 2018-10-11

## 2018-10-08 RX ADMIN — CARVEDILOL 12.5 MG: 12.5 TABLET, FILM COATED ORAL at 20:33

## 2018-10-08 RX ADMIN — SODIUM CHLORIDE: 9 INJECTION, SOLUTION INTRAVENOUS at 09:16

## 2018-10-08 RX ADMIN — ASPIRIN 81 MG: 81 TABLET, CHEWABLE ORAL at 09:16

## 2018-10-08 RX ADMIN — ENOXAPARIN SODIUM 30 MG: 30 INJECTION SUBCUTANEOUS at 09:16

## 2018-10-08 RX ADMIN — PRAVASTATIN SODIUM 20 MG: 20 TABLET ORAL at 20:33

## 2018-10-08 RX ADMIN — SERTRALINE HYDROCHLORIDE 50 MG: 50 TABLET ORAL at 09:16

## 2018-10-08 RX ADMIN — ISOSORBIDE MONONITRATE 60 MG: 60 TABLET, EXTENDED RELEASE ORAL at 09:16

## 2018-10-08 RX ADMIN — LEVOTHYROXINE SODIUM 50 MCG: 50 TABLET ORAL at 06:17

## 2018-10-08 RX ADMIN — CLOPIDOGREL BISULFATE 75 MG: 75 TABLET ORAL at 09:16

## 2018-10-08 RX ADMIN — CARVEDILOL 12.5 MG: 12.5 TABLET, FILM COATED ORAL at 09:16

## 2018-10-08 RX ADMIN — AMLODIPINE BESYLATE 10 MG: 10 TABLET ORAL at 09:16

## 2018-10-08 ASSESSMENT — PAIN SCALES - GENERAL
PAINLEVEL_OUTOF10: 0

## 2018-10-08 ASSESSMENT — ENCOUNTER SYMPTOMS
NAUSEA: 0
SHORTNESS OF BREATH: 0
BLOOD IN STOOL: 0
COUGH: 0
RESPIRATORY NEGATIVE: 1
EYES NEGATIVE: 1
STRIDOR: 0
GASTROINTESTINAL NEGATIVE: 1
CHEST TIGHTNESS: 0
WHEEZING: 0

## 2018-10-08 NOTE — CONSULTS
bicarbonate (EFFER-K) 25 MEQ disintegrating tablet, Take 1 tablet by mouth daily  clopidogrel (PLAVIX) 75 MG tablet, Take 1 tablet by mouth daily  levothyroxine (SYNTHROID) 50 MCG tablet, Take 1 tablet by mouth Daily  chlorthalidone (HYGROTON) 25 MG tablet, Take 12.5 mg by mouth every other day  carvedilol (COREG) 12.5 MG tablet, Take 12.5 mg by mouth 2 times daily   lisinopril (PRINIVIL;ZESTRIL) 20 MG tablet, Take 20 mg by mouth daily. sertraline (ZOLOFT) 50 MG tablet, Take 100 mg by mouth daily   amLODIPine (NORVASC) 10 MG tablet, Take 1 tablet by mouth daily  NITROSTAT 0.4 MG SL tablet, place 1 tablet under the tongue if needed every 5 minutes for chest pain for 3 doses IF NO RELIEF AFTER 3RD DOSE CALL PRESCRIBER . isosorbide mononitrate (IMDUR) 30 MG CR tablet, Take 30 mg by mouth daily. LORazepam (ATIVAN) 1 MG tablet, Take 1 mg by mouth every 6 hours as needed. Current Hospital Medications:     Scheduled Meds:   isosorbide mononitrate  60 mg Oral Daily    sodium chloride flush  10 mL Intravenous 2 times per day    aspirin  81 mg Oral Daily    enoxaparin  30 mg Subcutaneous Daily    influenza virus vaccine  0.5 mL Intramuscular Once    amLODIPine  10 mg Oral Daily    carvedilol  12.5 mg Oral BID    clopidogrel  75 mg Oral Daily    levothyroxine  50 mcg Oral Daily    pravastatin  20 mg Oral Nightly    sertraline  50 mg Oral Daily     Continuous Infusions:   sodium chloride       PRN Meds:.labetalol, sodium chloride flush, acetaminophen, magnesium hydroxide, ondansetron, nitroGLYCERIN, hydrALAZINE, LORazepam  .   sodium chloride          Allergies: Allergies   Allergen Reactions    Other      Nectarines    Sulfa Antibiotics Rash        Review of Systems:       Review of Systems   Constitutional: Negative. Negative for diaphoresis and fatigue. HENT: Negative. Eyes: Negative. Respiratory: Negative.   Negative for cough, chest tightness, shortness of breath, wheezing and Portable    Result Date: 10/7/2018  EXAMINATION: CHEST PORTABLE VIEW  CLINICAL HISTORY: Lethargic COMPARISONS: August 8, 2017  FINDINGS: Single  views of the chest is submitted. There is a left-sided ICD device. Leads overlying the cardiac foot. Unchanged The cardiac silhouette is enlarged unchanged configuration. .  The mediastinum is unremarkable. Pulmonary vascular unremarkable. Right sided trachea. No focal infiltrates. No Pneumothoraces. NO ACUTE ACTIVE CARDIOPULMONARY PROCESS      Active Hospital Problems    Diagnosis Date Noted    Syncope and collapse [R55]      Priority: High         Impression/Plan:   1. Syncope- will check CUS  2. HTN Urgency- BP now stable. Advance Imdur  3. CAD stable no angina  4. LARON- will give fluids today. Stop Lisinopril and HCTZ for now. 5. If CR baseline we will proceed with LHC as scheduled tomorrow. 6. Keep NPO post MN and Hold Lovenox post MN     Thank you for allowing us to participate in the care of this patient. Will continue to follow. Please call if questions or concerns arise.     Electronically signed by Kelsi Mcleod MD on 10/8/2018 at 8:54 AM

## 2018-10-08 NOTE — PROGRESS NOTES
Radiology:  US CAROTID ARTERY BILATERAL   Preliminary Result   SCATTERED INTIMAL THICKENING AND ATHEROSCLEROSIS MOST ARE HETEROGENEOUS. BILATERAL ANTEGRADE VERTEBRAL FLOW      LESS THAN 50% STENOSIS INVOLVING BOTH ICAS BY DUPLEX CRITERIA. CT HEAD WO CONTRAST   Final Result      NO ACUTE INTRA-AXIAL OR EXTRA-AXIAL FINDINGS. IF SIGNS OR SYMPTOMS PERSIST THEN CONSIDER REPEAT CT SCAN IN 12 TO 24 HOURS OR MRI TO FURTHER EVALUATE IF THERE ARE NO CONTRAINDICATIONS      All CT scans at this facility use dose modulation, iterative reconstruction, and/or weight based dosing when appropriate to reduce radiation dose to as low as reasonably achievable. XR CHEST PORTABLE   Final Result   NO ACUTE ACTIVE CARDIOPULMONARY PROCESS      US RETROPERITONEAL LIMITED    (Results Pending)     Assessment/Plan:    Syncope  For cath tomorrow     Hypertensive urgency  htn much improved.   Cont to monitor.      CAD s/p PCI to LAD in 2006  - scheduled for cardiac cath on Tuesday, 10/9  - resume home meds      UTI  - continue IV ceftriaxone  No growth to date.       CKD 3  - stable  - monitor renal function    35 minutes total care time, >1/2 in unit/floor time and care coordination     Electronically signed by Nimco Berg MD on 10/8/2018 at 3:52 PM

## 2018-10-08 NOTE — PROGRESS NOTES
Assistance: Needs assistance (pt reports doing minimal housework but 2 dtrs live nearby who assist often as well)  Ambulation Assistance: Independent (with cane \"a little bit\")  Transfer Assistance: Independent  Active : Yes Reyes Brightly during the day and short distances\")    OBJECTIVE:   Vision/Hearing:  Vision: Impaired  Vision Exceptions: Wears glasses at all times  Hearing: Within functional limits    Cognition:  Overall Orientation Status: Within Functional Limits  Follows Commands: Within Functional Limits         ROM:  RLE AROM: WFL  LLE AROM : WFL    Strength:  Strength RLE  Comment: grossly 4-/5  Strength LLE  Comment: grossly 4-/5    Neuro:  Balance  Sitting - Static: Good  Sitting - Dynamic: Good  Standing - Static: Good;-  Standing - Dynamic: Fair;+             Bed mobility  Supine to Sit: Stand by assistance  Sit to Supine: Stand by assistance    Transfers  Sit to Stand: Stand by assistance  Stand to sit: Stand by assistance  Bed to Chair: Stand by assistance (no AD but does hold onto chair to pivot)    Ambulation  Ambulation?: Yes  Ambulation 1  Surface: level tile  Device: No Device  Assistance: Contact guard assistance;Stand by assistance  Quality of Gait: with first trial, pt with excessive postural sway, reaching out to hold onto furniture, no LOB, decreased kylie, flexed posture; second trial, pt able to demo improved balance and improved trunk rotation  Distance: 20 ft x2    Activity Tolerance  Activity Tolerance: Patient Tolerated treatment well  Activity Tolerance: no s/s distress          ASSESSMENT:   Body structures, Functions, Activity limitations: Decreased functional mobility ; Decreased strength;Decreased endurance;Decreased balance  Decision Making: Medium Complexity    Prognosis: Good  Patient Education: Educated in safety awareness, Dc planning, PT POC    DISCHARGE RECOMMENDATIONS:  Discharge Recommendations: Continue to assess pending progress    Assessment: Pt demonstrates the

## 2018-10-09 LAB
ANION GAP SERPL CALCULATED.3IONS-SCNC: 15 MEQ/L (ref 7–13)
BUN BLDV-MCNC: 35 MG/DL (ref 8–23)
CALCIUM SERPL-MCNC: 8.2 MG/DL (ref 8.6–10.2)
CHLORIDE BLD-SCNC: 99 MEQ/L (ref 98–107)
CO2: 21 MEQ/L (ref 22–29)
CREAT SERPL-MCNC: 1.84 MG/DL (ref 0.5–0.9)
EKG ATRIAL RATE: 60 BPM
EKG ATRIAL RATE: 68 BPM
EKG P AXIS: 26 DEGREES
EKG P AXIS: 65 DEGREES
EKG P-R INTERVAL: 222 MS
EKG P-R INTERVAL: 280 MS
EKG Q-T INTERVAL: 444 MS
EKG Q-T INTERVAL: 472 MS
EKG QRS DURATION: 160 MS
EKG QRS DURATION: 168 MS
EKG QTC CALCULATION (BAZETT): 472 MS
EKG QTC CALCULATION (BAZETT): 472 MS
EKG R AXIS: -51 DEGREES
EKG R AXIS: -53 DEGREES
EKG T AXIS: 129 DEGREES
EKG T AXIS: 180 DEGREES
EKG VENTRICULAR RATE: 60 BPM
EKG VENTRICULAR RATE: 68 BPM
GFR AFRICAN AMERICAN: 31.5
GFR NON-AFRICAN AMERICAN: 26
GLUCOSE BLD-MCNC: 121 MG/DL (ref 74–109)
HCT VFR BLD CALC: 31.6 % (ref 37–47)
HEMOGLOBIN: 10.8 G/DL (ref 12–16)
MCH RBC QN AUTO: 32.4 PG (ref 27–31.3)
MCHC RBC AUTO-ENTMCNC: 34.3 % (ref 33–37)
MCV RBC AUTO: 94.4 FL (ref 82–100)
PDW BLD-RTO: 15.2 % (ref 11.5–14.5)
PLATELET # BLD: 138 K/UL (ref 130–400)
POTASSIUM SERPL-SCNC: 4.6 MEQ/L (ref 3.5–5.1)
RBC # BLD: 3.35 M/UL (ref 4.2–5.4)
SODIUM BLD-SCNC: 135 MEQ/L (ref 132–144)
URINE CULTURE, ROUTINE: NORMAL
WBC # BLD: 6.9 K/UL (ref 4.8–10.8)

## 2018-10-09 PROCEDURE — 2060000000 HC ICU INTERMEDIATE R&B

## 2018-10-09 PROCEDURE — 6360000002 HC RX W HCPCS: Performed by: INTERNAL MEDICINE

## 2018-10-09 PROCEDURE — 97535 SELF CARE MNGMENT TRAINING: CPT

## 2018-10-09 PROCEDURE — 80048 BASIC METABOLIC PNL TOTAL CA: CPT

## 2018-10-09 PROCEDURE — 93005 ELECTROCARDIOGRAM TRACING: CPT

## 2018-10-09 PROCEDURE — 85027 COMPLETE CBC AUTOMATED: CPT

## 2018-10-09 PROCEDURE — 2580000003 HC RX 258: Performed by: INTERNAL MEDICINE

## 2018-10-09 PROCEDURE — 99232 SBSQ HOSP IP/OBS MODERATE 35: CPT | Performed by: INTERNAL MEDICINE

## 2018-10-09 PROCEDURE — 36415 COLL VENOUS BLD VENIPUNCTURE: CPT

## 2018-10-09 PROCEDURE — G8988 SELF CARE GOAL STATUS: HCPCS

## 2018-10-09 PROCEDURE — 6370000000 HC RX 637 (ALT 250 FOR IP): Performed by: INTERNAL MEDICINE

## 2018-10-09 PROCEDURE — 97166 OT EVAL MOD COMPLEX 45 MIN: CPT

## 2018-10-09 PROCEDURE — G8987 SELF CARE CURRENT STATUS: HCPCS

## 2018-10-09 PROCEDURE — 97116 GAIT TRAINING THERAPY: CPT

## 2018-10-09 RX ORDER — DIPHENHYDRAMINE HYDROCHLORIDE 50 MG/ML
50 INJECTION INTRAMUSCULAR; INTRAVENOUS ONCE
Status: DISCONTINUED | OUTPATIENT
Start: 2018-10-09 | End: 2018-10-12 | Stop reason: HOSPADM

## 2018-10-09 RX ORDER — SODIUM CHLORIDE 450 MG/100ML
75 INJECTION, SOLUTION INTRAVENOUS CONTINUOUS
Status: DISCONTINUED | OUTPATIENT
Start: 2018-10-09 | End: 2018-10-09

## 2018-10-09 RX ORDER — SODIUM CHLORIDE 0.9 % (FLUSH) 0.9 %
10 SYRINGE (ML) INJECTION EVERY 12 HOURS SCHEDULED
Status: DISCONTINUED | OUTPATIENT
Start: 2018-10-09 | End: 2018-10-12 | Stop reason: HOSPADM

## 2018-10-09 RX ORDER — ONDANSETRON 2 MG/ML
4 INJECTION INTRAMUSCULAR; INTRAVENOUS EVERY 6 HOURS PRN
Status: DISCONTINUED | OUTPATIENT
Start: 2018-10-09 | End: 2018-10-12 | Stop reason: HOSPADM

## 2018-10-09 RX ORDER — SODIUM CHLORIDE 0.9 % (FLUSH) 0.9 %
10 SYRINGE (ML) INJECTION PRN
Status: DISCONTINUED | OUTPATIENT
Start: 2018-10-09 | End: 2018-10-12 | Stop reason: HOSPADM

## 2018-10-09 RX ADMIN — AMLODIPINE BESYLATE 10 MG: 10 TABLET ORAL at 09:13

## 2018-10-09 RX ADMIN — ENOXAPARIN SODIUM 30 MG: 30 INJECTION SUBCUTANEOUS at 09:13

## 2018-10-09 RX ADMIN — SERTRALINE HYDROCHLORIDE 50 MG: 50 TABLET ORAL at 09:13

## 2018-10-09 RX ADMIN — SODIUM CHLORIDE: 9 INJECTION, SOLUTION INTRAVENOUS at 21:57

## 2018-10-09 RX ADMIN — CLOPIDOGREL BISULFATE 75 MG: 75 TABLET ORAL at 09:13

## 2018-10-09 RX ADMIN — LEVOTHYROXINE SODIUM 50 MCG: 50 TABLET ORAL at 09:13

## 2018-10-09 RX ADMIN — ISOSORBIDE MONONITRATE 60 MG: 60 TABLET, EXTENDED RELEASE ORAL at 09:13

## 2018-10-09 RX ADMIN — CARVEDILOL 12.5 MG: 12.5 TABLET, FILM COATED ORAL at 09:13

## 2018-10-09 RX ADMIN — ACETAMINOPHEN 650 MG: 325 TABLET ORAL at 21:58

## 2018-10-09 RX ADMIN — PRAVASTATIN SODIUM 20 MG: 20 TABLET ORAL at 21:58

## 2018-10-09 RX ADMIN — ASPIRIN 81 MG: 81 TABLET, CHEWABLE ORAL at 09:13

## 2018-10-09 RX ADMIN — SODIUM CHLORIDE: 9 INJECTION, SOLUTION INTRAVENOUS at 11:45

## 2018-10-09 RX ADMIN — CARVEDILOL 12.5 MG: 12.5 TABLET, FILM COATED ORAL at 21:58

## 2018-10-09 ASSESSMENT — ENCOUNTER SYMPTOMS
COUGH: 0
BLOOD IN STOOL: 0
CHEST TIGHTNESS: 0
EYES NEGATIVE: 1
WHEEZING: 0
NAUSEA: 0
STRIDOR: 0
RESPIRATORY NEGATIVE: 1
SHORTNESS OF BREATH: 0
GASTROINTESTINAL NEGATIVE: 1

## 2018-10-09 ASSESSMENT — PAIN SCALES - GENERAL
PAINLEVEL_OUTOF10: 3
PAINLEVEL_OUTOF10: 0
PAINLEVEL_OUTOF10: 0

## 2018-10-09 NOTE — PROGRESS NOTES
BMP:    Lab Results   Component Value Date     10/09/2018    K 4.6 10/09/2018    K 5.1 10/08/2018    CL 99 10/09/2018    CO2 21 10/09/2018    BUN 35 10/09/2018    LABALBU 4.0 10/07/2018    CREATININE 1.84 10/09/2018    CALCIUM 8.2 10/09/2018    GFRAA 31.5 10/09/2018    LABGLOM 26.0 10/09/2018    GLUCOSE 121 10/09/2018     Magnesium:    Lab Results   Component Value Date    MG 2.4 08/08/2017     Troponin:    Lab Results   Component Value Date    TROPONINI <0.010 10/07/2018        Active Hospital Problems    Diagnosis Date Noted    Syncope and collapse [R55]      Priority: High        Assessment/Plan:  1. Syncope-CUS negative  2. HTN Urgency- BP now stable. Continue meds  3. CAD stable no angina at rest  4. LARON- continue fluids today. continue to hold Lisinopril and HCTZ for now. LARON improved some  5. I have cancelled [facility] for today. Will reschedule when GFR further improves.        Electronically signed by Aira Russell MD on 10/9/2018 at 7:36 AM

## 2018-10-09 NOTE — PROGRESS NOTES
Hospitalist  Follow-up      Date of Service: 10/9/2018    Subjective:     Pt seen no complaints no chest pain or sob or palpitation          Past Medical History:   Diagnosis Date    Anxiety     Depression     Heart disease     Hypertension     Hypothyroidism      family history is not on file. reports that she has never smoked. She has never used smokeless tobacco. She reports that she does not drink alcohol or use drugs.              Case DW RN       Objective:  Exam:  BP (!) 141/59   Pulse 66   Temp 98.6 °F (37 °C) (Oral)   Resp 16   Ht 5' 4\" (1.626 m)   Wt 140 lb (63.5 kg)   SpO2 95%   BMI 24.03 kg/m²     Physical Exam:  Head: Normocephalic, atraumatic  Sclera clear  Neck supple, nontender  Lungs: few scattered crackles    Medications:  Current Facility-Administered Medications   Medication Dose Route Frequency Provider Last Rate Last Dose    diphenhydrAMINE (BENADRYL) injection 50 mg  50 mg Intravenous Once Janette Villalpando MD        hydrocortisone sodium succinate PF (SOLU-CORTEF) injection 200 mg  200 mg Intravenous Once Janette Villalpando MD        ondansetron [facility]) injection 4 mg  4 mg Intravenous Q6H PRN Janette Villalpando MD        sodium chloride flush 0.9 % injection 10 mL  10 mL Intravenous 2 times per day Janette Villalpando MD        sodium chloride flush 0.9 % injection 10 mL  10 mL Intravenous PRN Janette Villalpando MD        isosorbide mononitrate (IMDUR) extended release tablet 60 mg  60 mg Oral Daily Janette Villalpando MD   60 mg at 10/09/18 0913    labetalol (NORMODYNE;TRANDATE) injection 20 mg  20 mg Intravenous Q6H PRN Janette Villalpando MD        0.9 % sodium chloride infusion   Intravenous Continuous Janette Villalpando  mL/hr at 10/08/18 0916      sodium chloride flush 0.9 % injection 10 mL  10 mL Intravenous 2 times per day Betty Ly MD   10 mL at 10/07/18 2053    sodium chloride flush 0.9 % injection 10 mL  10 mL Intravenous PRN Betty Ly MD        acetaminophen (TYLENOL) tablet 650 possibly vasovagal or some componenet of dehydration. othostatic vitals not checked  . Labile uncontrolled HTN  CAD s/p PCIO  . Urine cx neg  . Urinary incontinence r/o retention, obs uropathy  .  LARON on CKD probably hemodynamic changes and dehdyration improving on iv fluids baseline cr 1.4    Recommendations:  C/w gentl hydration  D/w dr. Sarah Rodriguez. Possible cathn in am  Orthostatic vitals  Bladder scan  Renal us  Monitor kidney function    Doroteo Pineda MD    Electronically signed by Doroteo Pineda MD on 10/9/18 at 10:12 AM

## 2018-10-10 ENCOUNTER — APPOINTMENT (OUTPATIENT)
Dept: CARDIAC CATH/INVASIVE PROCEDURES | Age: 83
DRG: 246 | End: 2018-10-10
Payer: MEDICARE

## 2018-10-10 LAB
ANION GAP SERPL CALCULATED.3IONS-SCNC: 13 MEQ/L (ref 7–13)
BUN BLDV-MCNC: 26 MG/DL (ref 8–23)
CALCIUM SERPL-MCNC: 8 MG/DL (ref 8.6–10.2)
CHLORIDE BLD-SCNC: 101 MEQ/L (ref 98–107)
CO2: 19 MEQ/L (ref 22–29)
CREAT SERPL-MCNC: 1.31 MG/DL (ref 0.5–0.9)
GFR AFRICAN AMERICAN: 46.6
GFR NON-AFRICAN AMERICAN: 38.5
GLUCOSE BLD-MCNC: 100 MG/DL (ref 74–109)
HCT VFR BLD CALC: 30.2 % (ref 37–47)
HEMOGLOBIN: 10.3 G/DL (ref 12–16)
MCH RBC QN AUTO: 32.5 PG (ref 27–31.3)
MCHC RBC AUTO-ENTMCNC: 34.1 % (ref 33–37)
MCV RBC AUTO: 95.2 FL (ref 82–100)
PDW BLD-RTO: 15.1 % (ref 11.5–14.5)
PERFORMED ON: ABNORMAL
PLATELET # BLD: 123 K/UL (ref 130–400)
POC ACTIVATED CLOTTING TIME KAOLIN: 263 SEC (ref 82–152)
POC SAMPLE TYPE: ABNORMAL
POTASSIUM SERPL-SCNC: 4.3 MEQ/L (ref 3.5–5.1)
RBC # BLD: 3.17 M/UL (ref 4.2–5.4)
SODIUM BLD-SCNC: 133 MEQ/L (ref 132–144)
WBC # BLD: 5.5 K/UL (ref 4.8–10.8)

## 2018-10-10 PROCEDURE — 93458 L HRT ARTERY/VENTRICLE ANGIO: CPT | Performed by: INTERNAL MEDICINE

## 2018-10-10 PROCEDURE — 37221 HC ILIAC TERRITORY PLASTY STENT: CPT | Performed by: INTERNAL MEDICINE

## 2018-10-10 PROCEDURE — 6360000002 HC RX W HCPCS

## 2018-10-10 PROCEDURE — 2500000003 HC RX 250 WO HCPCS

## 2018-10-10 PROCEDURE — B41B1ZZ FLUOROSCOPY OF OTHER INTRA-ABDOMINAL ARTERIES USING LOW OSMOLAR CONTRAST: ICD-10-PCS | Performed by: INTERNAL MEDICINE

## 2018-10-10 PROCEDURE — 90686 IIV4 VACC NO PRSV 0.5 ML IM: CPT | Performed by: INTERNAL MEDICINE

## 2018-10-10 PROCEDURE — 6360000002 HC RX W HCPCS: Performed by: INTERNAL MEDICINE

## 2018-10-10 PROCEDURE — C1874 STENT, COATED/COV W/DEL SYS: HCPCS

## 2018-10-10 PROCEDURE — 2580000003 HC RX 258: Performed by: INTERNAL MEDICINE

## 2018-10-10 PROCEDURE — 027135Z DILATION OF CORONARY ARTERY, TWO ARTERIES WITH TWO DRUG-ELUTING INTRALUMINAL DEVICES, PERCUTANEOUS APPROACH: ICD-10-PCS | Performed by: INTERNAL MEDICINE

## 2018-10-10 PROCEDURE — 6370000000 HC RX 637 (ALT 250 FOR IP): Performed by: INTERNAL MEDICINE

## 2018-10-10 PROCEDURE — 99233 SBSQ HOSP IP/OBS HIGH 50: CPT | Performed by: INTERNAL MEDICINE

## 2018-10-10 PROCEDURE — 80048 BASIC METABOLIC PNL TOTAL CA: CPT

## 2018-10-10 PROCEDURE — 92928 PRQ TCAT PLMT NTRAC ST 1 LES: CPT | Performed by: INTERNAL MEDICINE

## 2018-10-10 PROCEDURE — C1760 CLOSURE DEV, VASC: HCPCS

## 2018-10-10 PROCEDURE — C1725 CATH, TRANSLUMIN NON-LASER: HCPCS

## 2018-10-10 PROCEDURE — 85027 COMPLETE CBC AUTOMATED: CPT

## 2018-10-10 PROCEDURE — 36415 COLL VENOUS BLD VENIPUNCTURE: CPT

## 2018-10-10 PROCEDURE — 2060000000 HC ICU INTERMEDIATE R&B

## 2018-10-10 PROCEDURE — G0008 ADMIN INFLUENZA VIRUS VAC: HCPCS | Performed by: INTERNAL MEDICINE

## 2018-10-10 PROCEDURE — C1894 INTRO/SHEATH, NON-LASER: HCPCS

## 2018-10-10 PROCEDURE — 85347 COAGULATION TIME ACTIVATED: CPT

## 2018-10-10 PROCEDURE — B2151ZZ FLUOROSCOPY OF LEFT HEART USING LOW OSMOLAR CONTRAST: ICD-10-PCS | Performed by: INTERNAL MEDICINE

## 2018-10-10 PROCEDURE — C1769 GUIDE WIRE: HCPCS

## 2018-10-10 PROCEDURE — 2580000003 HC RX 258

## 2018-10-10 PROCEDURE — C1876 STENT, NON-COA/NON-COV W/DEL: HCPCS

## 2018-10-10 PROCEDURE — 36252 INS CATH REN ART 1ST BILAT: CPT | Performed by: INTERNAL MEDICINE

## 2018-10-10 PROCEDURE — C1887 CATHETER, GUIDING: HCPCS

## 2018-10-10 PROCEDURE — B2111ZZ FLUOROSCOPY OF MULTIPLE CORONARY ARTERIES USING LOW OSMOLAR CONTRAST: ICD-10-PCS | Performed by: INTERNAL MEDICINE

## 2018-10-10 PROCEDURE — 37221 PR REVSC OPN/PRQ ILIAC ART W/STNT PLMT & ANGIOPLSTY: CPT | Performed by: INTERNAL MEDICINE

## 2018-10-10 PROCEDURE — 2709999900 HC NON-CHARGEABLE SUPPLY

## 2018-10-10 PROCEDURE — 4A023N7 MEASUREMENT OF CARDIAC SAMPLING AND PRESSURE, LEFT HEART, PERCUTANEOUS APPROACH: ICD-10-PCS | Performed by: INTERNAL MEDICINE

## 2018-10-10 RX ORDER — ASPIRIN 81 MG/1
81 TABLET ORAL DAILY
Qty: 30 TABLET | Refills: 3 | Status: SHIPPED | OUTPATIENT
Start: 2018-10-10 | End: 2021-03-08

## 2018-10-10 RX ORDER — PRAVASTATIN SODIUM 40 MG
40 TABLET ORAL NIGHTLY
Qty: 30 TABLET | Refills: 6 | Status: SHIPPED | OUTPATIENT
Start: 2018-10-10

## 2018-10-10 RX ADMIN — SERTRALINE HYDROCHLORIDE 50 MG: 50 TABLET ORAL at 08:49

## 2018-10-10 RX ADMIN — CARVEDILOL 12.5 MG: 12.5 TABLET, FILM COATED ORAL at 22:25

## 2018-10-10 RX ADMIN — CLOPIDOGREL BISULFATE 75 MG: 75 TABLET ORAL at 08:49

## 2018-10-10 RX ADMIN — INFLUENZA A VIRUS A/MICHIGAN/45/2015 X-275 (H1N1) ANTIGEN (FORMALDEHYDE INACTIVATED), INFLUENZA A VIRUS A/SINGAPORE/INFIMH-16-0019/2016 IVR-186 (H3N2) ANTIGEN (FORMALDEHYDE INACTIVATED), INFLUENZA B VIRUS B/PHUKET/3073/2013 ANTIGEN (FORMALDEHYDE INACTIVATED), AND INFLUENZA B VIRUS B/MARYLAND/15/2016 BX-69A ANTIGEN (FORMALDEHYDE INACTIVATED) 0.5 ML: 15; 15; 15; 15 INJECTION, SUSPENSION INTRAMUSCULAR at 08:51

## 2018-10-10 RX ADMIN — SODIUM CHLORIDE, PRESERVATIVE FREE 10 ML: 5 INJECTION INTRAVENOUS at 22:25

## 2018-10-10 RX ADMIN — ASPIRIN 81 MG: 81 TABLET, CHEWABLE ORAL at 08:49

## 2018-10-10 RX ADMIN — AMLODIPINE BESYLATE 10 MG: 10 TABLET ORAL at 08:49

## 2018-10-10 RX ADMIN — SODIUM CHLORIDE: 9 INJECTION, SOLUTION INTRAVENOUS at 08:52

## 2018-10-10 RX ADMIN — PRAVASTATIN SODIUM 20 MG: 20 TABLET ORAL at 22:25

## 2018-10-10 RX ADMIN — ISOSORBIDE MONONITRATE 60 MG: 60 TABLET, EXTENDED RELEASE ORAL at 08:49

## 2018-10-10 RX ADMIN — CARVEDILOL 12.5 MG: 12.5 TABLET, FILM COATED ORAL at 08:49

## 2018-10-10 ASSESSMENT — PAIN SCALES - GENERAL
PAINLEVEL_OUTOF10: 1
PAINLEVEL_OUTOF10: 0
PAINLEVEL_OUTOF10: 2
PAINLEVEL_OUTOF10: 2

## 2018-10-10 ASSESSMENT — ENCOUNTER SYMPTOMS
CHEST TIGHTNESS: 0
SHORTNESS OF BREATH: 0
BLOOD IN STOOL: 0
COUGH: 0
EYES NEGATIVE: 1
WHEEZING: 0
STRIDOR: 0
NAUSEA: 0
RESPIRATORY NEGATIVE: 1
GASTROINTESTINAL NEGATIVE: 1

## 2018-10-10 ASSESSMENT — PAIN DESCRIPTION - PAIN TYPE
TYPE: CHRONIC PAIN
TYPE: CHRONIC PAIN

## 2018-10-10 ASSESSMENT — PAIN DESCRIPTION - LOCATION
LOCATION: GENERALIZED
LOCATION: GENERALIZED

## 2018-10-10 NOTE — PROGRESS NOTES
To 1WT per cart. Daughter here with pt. Pts Depend dry under her. Pt advised she must use bedpan for 2 more hours.  ca

## 2018-10-10 NOTE — PROGRESS NOTES
Pt prepped for possible cath procedure later today. Pt was bathed & shaved and fluids are running. 2nd IV, # 20 placed in Right Forearm.       Electronically signed by Emilia Sepulveda RN on 10/10/2018 at 2:33 AM

## 2018-10-10 NOTE — PROGRESS NOTES
Results   Component Value Date    INR 1.0 10/07/2018    PROTIME 10.6 10/07/2018     No results found for: ORG    RADIOLOGY:  Ct Head Wo Contrast    Result Date: 10/7/2018  CT HEAD WO CONTRAST CLINICAL HISTORY:  syncope COMPARISON: August 7, 2017 TECHNIQUE: Multiple unenhanced serial axial images of the brain from the vertex of the skull to the base of the skull were performed. FINDINGS: The ventricles are dilated. This is compensatory to the surrounding moderate generalized parenchymal volume loss. No mass. No midline shift. The cisterns are patent. There are white matter and periventricular changes most likely consistent with chronic small vessel disease. Small bilateral basal ganglia lacunar infarcts. Unchanged Small right thalamic lacunar infarct. No acute intra-axial or extra-axial findings. The visualized osseous structures are unremarkable. The visualized portion of the paranasal sinuses, and mastoids are unremarkable. NO ACUTE INTRA-AXIAL OR EXTRA-AXIAL FINDINGS. IF SIGNS OR SYMPTOMS PERSIST THEN CONSIDER REPEAT CT SCAN IN 12 TO 24 HOURS OR MRI TO FURTHER EVALUATE IF THERE ARE NO CONTRAINDICATIONS All CT scans at this facility use dose modulation, iterative reconstruction, and/or weight based dosing when appropriate to reduce radiation dose to as low as reasonably achievable. Xr Chest Portable    Result Date: 10/7/2018  EXAMINATION: CHEST PORTABLE VIEW  CLINICAL HISTORY: Lethargic COMPARISONS: August 8, 2017  FINDINGS: Single  views of the chest is submitted. There is a left-sided ICD device. Leads overlying the cardiac foot. Unchanged The cardiac silhouette is enlarged unchanged configuration. .  The mediastinum is unremarkable. Pulmonary vascular unremarkable. Right sided trachea. No focal infiltrates. No Pneumothoraces.                                                                                    NO ACUTE ACTIVE CARDIOPULMONARY PROCESS    Us Retroperitoneal Limited    Result Date:

## 2018-10-10 NOTE — PROGRESS NOTES
Progress Note  Patient: Nilda Opitz  Unit/Bed: V028/J346-10  YOB: 1932  MRN: [id number]  Acct: [de-identified]   Admitting Diagnosis: Syncope and collapse [R55]  Admit Date:  10/7/2018  Hospital Day: 3    Chief Complaint:syncope diandra angina CAD PPM    Histories:  Past Medical History:   Diagnosis Date    Anxiety     Depression     Heart disease     Hypertension     Hypothyroidism      Past Surgical History:   Procedure Laterality Date    BREAST CYST ASPIRATION Right 1/17/14    U/S guided core bx of the right breast    HYSTERECTOMY      PACEMAKER INSERTION       History reviewed. No pertinent family history. Social History     Social History    Marital status:      Spouse name: N/A    Number of children: N/A    Years of education: N/A     Social History Main Topics    Smoking status: Never Smoker    Smokeless tobacco: Never Used    Alcohol use No    Drug use: No    Sexual activity: No     Other Topics Concern    None     Social History Narrative    None       Subjective/HPI; Feels fine no cp brreathing ok. Walking in room    EKG:A Paced        Review of Systems:   Review of Systems   Constitutional: Negative. Negative for diaphoresis and fatigue. HENT: Negative. Eyes: Negative. Respiratory: Negative. Negative for cough, chest tightness, shortness of breath, wheezing and stridor. Cardiovascular: Negative. Negative for chest pain, palpitations and leg swelling. Gastrointestinal: Negative. Negative for blood in stool and nausea. Genitourinary: Negative. Musculoskeletal: Negative. Skin: Negative. Neurological: Negative. Negative for dizziness, syncope, weakness and light-headedness. Hematological: Negative. Psychiatric/Behavioral: Negative.           Physical Examination:    BP (!) 121/51   Pulse 65   Temp 98.1 °F (36.7 °C) (Oral)   Resp 16   Ht 5' 4\" (1.626 m)   Wt 140 lb (63.5 kg)   SpO2 97%   BMI 24.03 kg/m²    Physical Exam   Constitutional: She appears healthy. No distress. HENT:   Normal cephalic and Atraumatic   Eyes: Pupils are equal, round, and reactive to light. Neck: Normal range of motion and thyroid normal. Neck supple. No JVD present. No neck adenopathy. No thyromegaly present. Cardiovascular: Normal rate, regular rhythm, normal heart sounds, intact distal pulses and normal pulses. Pulmonary/Chest: Effort normal. She has no wheezes. She has bibasilar rales. She exhibits no tenderness. Abdominal: Soft. Bowel sounds are normal. There is no tenderness. Musculoskeletal: Normal range of motion. She exhibits no edema or tenderness. Neurological: She is alert and oriented to person, place, and time. Skin: Skin is warm. No cyanosis. Nails show no clubbing.        LABS:  CBC:   Lab Results   Component Value Date    WBC 5.5 10/10/2018    RBC 3.17 10/10/2018    HGB 10.3 10/10/2018    HCT 30.2 10/10/2018    MCV 95.2 10/10/2018    MCH 32.5 10/10/2018    MCHC 34.1 10/10/2018    RDW 15.1 10/10/2018     10/10/2018    MPV 9.1 07/18/2014     CBC with Differential:    Lab Results   Component Value Date    WBC 5.5 10/10/2018    RBC 3.17 10/10/2018    HGB 10.3 10/10/2018    HCT 30.2 10/10/2018     10/10/2018    MCV 95.2 10/10/2018    MCH 32.5 10/10/2018    MCHC 34.1 10/10/2018    RDW 15.1 10/10/2018    LYMPHOPCT 27.9 10/07/2018    MONOPCT 11.4 10/07/2018    BASOPCT 0.7 10/07/2018    MONOSABS 0.5 10/07/2018    LYMPHSABS 1.2 10/07/2018    EOSABS 0.1 10/07/2018    BASOSABS 0.0 10/07/2018     CMP:    Lab Results   Component Value Date     10/10/2018    K 4.3 10/10/2018    K 5.1 10/08/2018     10/10/2018    CO2 19 10/10/2018    BUN 26 10/10/2018    CREATININE 1.31 10/10/2018    GFRAA 46.6 10/10/2018    LABGLOM 38.5 10/10/2018    GLUCOSE 100 10/10/2018    PROT 7.5 10/07/2018    LABALBU 4.0 10/07/2018    CALCIUM 8.0 10/10/2018    BILITOT 0.4 10/07/2018    ALKPHOS 62 10/07/2018    AST 24 10/07/2018    ALT 12 10/07/2018     BMP:

## 2018-10-11 LAB
ANION GAP SERPL CALCULATED.3IONS-SCNC: 12 MEQ/L (ref 7–13)
BUN BLDV-MCNC: 28 MG/DL (ref 8–23)
CALCIUM SERPL-MCNC: 8 MG/DL (ref 8.6–10.2)
CHLORIDE BLD-SCNC: 104 MEQ/L (ref 98–107)
CO2: 22 MEQ/L (ref 22–29)
CREAT SERPL-MCNC: 1.58 MG/DL (ref 0.5–0.9)
GFR AFRICAN AMERICAN: 37.5
GFR NON-AFRICAN AMERICAN: 31
GLUCOSE BLD-MCNC: 109 MG/DL (ref 74–109)
HCT VFR BLD CALC: 28.1 % (ref 37–47)
HEMOGLOBIN: 9.5 G/DL (ref 12–16)
MCH RBC QN AUTO: 32.2 PG (ref 27–31.3)
MCHC RBC AUTO-ENTMCNC: 34 % (ref 33–37)
MCV RBC AUTO: 94.7 FL (ref 82–100)
PDW BLD-RTO: 15.4 % (ref 11.5–14.5)
PLATELET # BLD: 126 K/UL (ref 130–400)
POTASSIUM REFLEX MAGNESIUM: 4.4 MEQ/L (ref 3.5–5.1)
POTASSIUM SERPL-SCNC: 4.4 MEQ/L (ref 3.5–5.1)
RBC # BLD: 2.97 M/UL (ref 4.2–5.4)
SODIUM BLD-SCNC: 138 MEQ/L (ref 132–144)
WBC # BLD: 7 K/UL (ref 4.8–10.8)

## 2018-10-11 PROCEDURE — 6370000000 HC RX 637 (ALT 250 FOR IP): Performed by: INTERNAL MEDICINE

## 2018-10-11 PROCEDURE — 2580000003 HC RX 258: Performed by: INTERNAL MEDICINE

## 2018-10-11 PROCEDURE — 2060000000 HC ICU INTERMEDIATE R&B

## 2018-10-11 PROCEDURE — 97116 GAIT TRAINING THERAPY: CPT

## 2018-10-11 PROCEDURE — 6360000002 HC RX W HCPCS: Performed by: INTERNAL MEDICINE

## 2018-10-11 PROCEDURE — 97535 SELF CARE MNGMENT TRAINING: CPT

## 2018-10-11 PROCEDURE — 99232 SBSQ HOSP IP/OBS MODERATE 35: CPT | Performed by: INTERNAL MEDICINE

## 2018-10-11 PROCEDURE — 80048 BASIC METABOLIC PNL TOTAL CA: CPT

## 2018-10-11 PROCEDURE — 85027 COMPLETE CBC AUTOMATED: CPT

## 2018-10-11 PROCEDURE — 36415 COLL VENOUS BLD VENIPUNCTURE: CPT

## 2018-10-11 RX ORDER — SODIUM CHLORIDE 9 MG/ML
INJECTION, SOLUTION INTRAVENOUS CONTINUOUS
Status: ACTIVE | OUTPATIENT
Start: 2018-10-11 | End: 2018-10-12

## 2018-10-11 RX ADMIN — SODIUM CHLORIDE: 9 INJECTION, SOLUTION INTRAVENOUS at 12:00

## 2018-10-11 RX ADMIN — SODIUM CHLORIDE: 9 INJECTION, SOLUTION INTRAVENOUS at 23:47

## 2018-10-11 RX ADMIN — ISOSORBIDE MONONITRATE 60 MG: 60 TABLET, EXTENDED RELEASE ORAL at 08:23

## 2018-10-11 RX ADMIN — AMLODIPINE BESYLATE 10 MG: 10 TABLET ORAL at 08:23

## 2018-10-11 RX ADMIN — MAGNESIUM HYDROXIDE 30 ML: 400 SUSPENSION ORAL at 09:30

## 2018-10-11 RX ADMIN — SODIUM CHLORIDE, PRESERVATIVE FREE 10 ML: 5 INJECTION INTRAVENOUS at 09:00

## 2018-10-11 RX ADMIN — LEVOTHYROXINE SODIUM 50 MCG: 50 TABLET ORAL at 06:14

## 2018-10-11 RX ADMIN — SODIUM CHLORIDE: 9 INJECTION, SOLUTION INTRAVENOUS at 00:36

## 2018-10-11 RX ADMIN — ASPIRIN 81 MG: 81 TABLET, CHEWABLE ORAL at 08:23

## 2018-10-11 RX ADMIN — SERTRALINE HYDROCHLORIDE 50 MG: 50 TABLET ORAL at 08:23

## 2018-10-11 RX ADMIN — PRAVASTATIN SODIUM 20 MG: 20 TABLET ORAL at 23:50

## 2018-10-11 RX ADMIN — CARVEDILOL 12.5 MG: 12.5 TABLET, FILM COATED ORAL at 23:50

## 2018-10-11 RX ADMIN — CLOPIDOGREL BISULFATE 75 MG: 75 TABLET ORAL at 08:23

## 2018-10-11 RX ADMIN — ACETAMINOPHEN 650 MG: 325 TABLET ORAL at 09:30

## 2018-10-11 RX ADMIN — CARVEDILOL 12.5 MG: 12.5 TABLET, FILM COATED ORAL at 08:23

## 2018-10-11 RX ADMIN — ENOXAPARIN SODIUM 30 MG: 30 INJECTION SUBCUTANEOUS at 08:22

## 2018-10-11 ASSESSMENT — PAIN SCALES - GENERAL
PAINLEVEL_OUTOF10: 5
PAINLEVEL_OUTOF10: 0
PAINLEVEL_OUTOF10: 0

## 2018-10-11 ASSESSMENT — ENCOUNTER SYMPTOMS
STRIDOR: 0
WHEEZING: 0
NAUSEA: 0
EYES NEGATIVE: 1
CHEST TIGHTNESS: 0
SHORTNESS OF BREATH: 0
RESPIRATORY NEGATIVE: 1
GASTROINTESTINAL NEGATIVE: 1
COUGH: 0
BLOOD IN STOOL: 0

## 2018-10-11 NOTE — PROGRESS NOTES
Physical Therapy Med Surg Daily Treatment Note  [facility]  [id number]       NAME: Kaylin Dong  : 1932 (80 y.o.)  MRN: [id number]  CODE STATUS: Full Code    Date of Service: 10/11/2018    Patient Diagnosis(es): Syncope and collapse [R55]   Chief Complaint   Patient presents with    Loss of Consciousness          Fatigue     2 weeks     Patient Active Problem List    Diagnosis Date Noted    Syncope and collapse      Priority: High    GRAHAM (dyspnea on exertion) 10/05/2018    Essential hypertension 2018    Status post placement of other cardiac pacemaker 2017    Sick sinus syndrome ([address] 2017    History of coronary artery stent placement 2017    Depression     Anxiety     Hypothyroidism     Hypertension     Heart disease     Osteoarthritis of shoulder 2012        Past Medical History:   Diagnosis Date    Anxiety     Depression     Heart disease     Hypertension     Hypothyroidism      Past Surgical History:   Procedure Laterality Date    BREAST CYST ASPIRATION Right 14    U/S guided core bx of the right breast    HYSTERECTOMY      PACEMAKER INSERTION       Restrictions  Restrictions/Precautions: Fall Risk    Subjective   General  Chart Reviewed: Yes  Family / Caregiver Present: Yes  Subjective  Subjective: pt agreeable to tx  General Comment  Comments: nothing new to report  Pre Treatment Pain Screening  Pain at present: 0  Scale Used: Numeric Score    Pain Screening  Patient Currently in Pain: Denies     Pain Reassessment: 0  Pain Assessment  Pain Assessment: 0-10  Pain Level: 0   Orientation   Objective   Bed mobility  Rolling to Left: Modified independent  Supine to Sit: Supervision    Transfers  Sit to Stand: Supervision  Stand to sit: Supervision    Ambulation  Ambulation?: Yes  Ambulation 1  Surface: level tile  Other Apparatus:  (IV pole)  Assistance: Stand by assistance  Quality of Gait: nbos with decreased step

## 2018-10-11 NOTE — PROGRESS NOTES
possibly vasovagal or some componenet of dehydration. . Labile uncontrolled HTN. beter controlled  CAD s/p DEX CX. 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 . Urine cx neg  . Urinary incontinence no evidence of obstructive uropathy  .  LARON on CKD probably hemodynamic changes and dehdyration Cr is up after cath    Recommendations:  C/w gentle iv hydration monitor kidney fucntion and order PT eval    Claude Henry MD    Electronically signed by Claude Henry MD on 10/11/18 at 11:35 AM

## 2018-10-11 NOTE — PROGRESS NOTES
Physical Therapy Missed Treatment   Facility/Department: [facility] MED SURG [id number]/[id number]    NAME: Kenisha Ledezma  Patient Status:   : 1932 (80 y.o.)  MRN: [id number]  Account: [de-identified]  Gender: female        [] Patient Declines PT Treatment            [x] Patient Unavailable: pt sleeping soundly, will check on later. Will attempt PT Treatment again at earliest convenience.         Electronically signed by Lurdes Lepe PTA on 10/11/18 at 2:22 PM

## 2018-10-12 VITALS
HEIGHT: 64 IN | HEART RATE: 62 BPM | WEIGHT: 149.25 LBS | BODY MASS INDEX: 25.48 KG/M2 | TEMPERATURE: 97.9 F | SYSTOLIC BLOOD PRESSURE: 113 MMHG | DIASTOLIC BLOOD PRESSURE: 45 MMHG | OXYGEN SATURATION: 97 % | RESPIRATION RATE: 16 BRPM

## 2018-10-12 LAB
ANION GAP SERPL CALCULATED.3IONS-SCNC: 10 MEQ/L (ref 7–13)
BLOOD CULTURE, ROUTINE: NORMAL
BUN BLDV-MCNC: 21 MG/DL (ref 8–23)
CALCIUM SERPL-MCNC: 8.1 MG/DL (ref 8.6–10.2)
CHLORIDE BLD-SCNC: 106 MEQ/L (ref 98–107)
CO2: 23 MEQ/L (ref 22–29)
CREAT SERPL-MCNC: 1.15 MG/DL (ref 0.5–0.9)
CULTURE, BLOOD 2: NORMAL
GFR AFRICAN AMERICAN: 54.1
GFR NON-AFRICAN AMERICAN: 44.7
GLUCOSE BLD-MCNC: 98 MG/DL (ref 74–109)
HCT VFR BLD CALC: 27.2 % (ref 37–47)
HEMOGLOBIN: 9.1 G/DL (ref 12–16)
MCH RBC QN AUTO: 31.6 PG (ref 27–31.3)
MCHC RBC AUTO-ENTMCNC: 33.4 % (ref 33–37)
MCV RBC AUTO: 94.8 FL (ref 82–100)
PDW BLD-RTO: 14.9 % (ref 11.5–14.5)
PLATELET # BLD: 127 K/UL (ref 130–400)
POTASSIUM SERPL-SCNC: 4.8 MEQ/L (ref 3.5–5.1)
RBC # BLD: 2.87 M/UL (ref 4.2–5.4)
SODIUM BLD-SCNC: 139 MEQ/L (ref 132–144)
WBC # BLD: 5.3 K/UL (ref 4.8–10.8)

## 2018-10-12 PROCEDURE — 6370000000 HC RX 637 (ALT 250 FOR IP): Performed by: INTERNAL MEDICINE

## 2018-10-12 PROCEDURE — 85027 COMPLETE CBC AUTOMATED: CPT

## 2018-10-12 PROCEDURE — 2580000003 HC RX 258: Performed by: INTERNAL MEDICINE

## 2018-10-12 PROCEDURE — 80048 BASIC METABOLIC PNL TOTAL CA: CPT

## 2018-10-12 PROCEDURE — 6360000002 HC RX W HCPCS: Performed by: INTERNAL MEDICINE

## 2018-10-12 PROCEDURE — 36415 COLL VENOUS BLD VENIPUNCTURE: CPT

## 2018-10-12 PROCEDURE — 99232 SBSQ HOSP IP/OBS MODERATE 35: CPT | Performed by: INTERNAL MEDICINE

## 2018-10-12 RX ADMIN — SODIUM CHLORIDE, PRESERVATIVE FREE 10 ML: 5 INJECTION INTRAVENOUS at 08:06

## 2018-10-12 RX ADMIN — ENOXAPARIN SODIUM 30 MG: 30 INJECTION SUBCUTANEOUS at 08:06

## 2018-10-12 RX ADMIN — ISOSORBIDE MONONITRATE 60 MG: 60 TABLET, EXTENDED RELEASE ORAL at 08:06

## 2018-10-12 RX ADMIN — LEVOTHYROXINE SODIUM 50 MCG: 50 TABLET ORAL at 06:06

## 2018-10-12 RX ADMIN — ASPIRIN 81 MG: 81 TABLET, CHEWABLE ORAL at 08:06

## 2018-10-12 RX ADMIN — CARVEDILOL 12.5 MG: 12.5 TABLET, FILM COATED ORAL at 08:06

## 2018-10-12 RX ADMIN — SERTRALINE HYDROCHLORIDE 50 MG: 50 TABLET ORAL at 08:06

## 2018-10-12 RX ADMIN — CLOPIDOGREL BISULFATE 75 MG: 75 TABLET ORAL at 08:06

## 2018-10-12 RX ADMIN — AMLODIPINE BESYLATE 10 MG: 10 TABLET ORAL at 08:06

## 2018-10-12 ASSESSMENT — ENCOUNTER SYMPTOMS
NAUSEA: 0
RESPIRATORY NEGATIVE: 1
BLOOD IN STOOL: 0
GASTROINTESTINAL NEGATIVE: 1
SHORTNESS OF BREATH: 0
COUGH: 0
STRIDOR: 0
CHEST TIGHTNESS: 0
EYES NEGATIVE: 1
WHEEZING: 0

## 2018-10-12 ASSESSMENT — PAIN DESCRIPTION - PAIN TYPE: TYPE: CHRONIC PAIN

## 2018-10-12 ASSESSMENT — PAIN DESCRIPTION - LOCATION: LOCATION: GENERALIZED

## 2018-10-12 ASSESSMENT — PAIN SCALES - GENERAL: PAINLEVEL_OUTOF10: 0

## 2018-10-12 NOTE — DISCHARGE SUMMARY
paranasal sinuses, and mastoids are unremarkable. NO ACUTE INTRA-AXIAL OR EXTRA-AXIAL FINDINGS. IF SIGNS OR SYMPTOMS PERSIST THEN CONSIDER REPEAT CT SCAN IN 12 TO 24 HOURS OR MRI TO FURTHER EVALUATE IF THERE ARE NO CONTRAINDICATIONS All CT scans at this facility use dose modulation, iterative reconstruction, and/or weight based dosing when appropriate to reduce radiation dose to as low as reasonably achievable. Xr Chest Portable    Result Date: 10/7/2018  EXAMINATION: CHEST PORTABLE VIEW  CLINICAL HISTORY: Lethargic COMPARISONS: August 8, 2017  FINDINGS: Single  views of the chest is submitted. There is a left-sided ICD device. Leads overlying the cardiac foot. Unchanged The cardiac silhouette is enlarged unchanged configuration. .  The mediastinum is unremarkable. Pulmonary vascular unremarkable. Right sided trachea. No focal infiltrates. No Pneumothoraces. NO ACUTE ACTIVE CARDIOPULMONARY PROCESS    Us Retroperitoneal Limited    Result Date: 10/9/2018  EXAMINATION: US RETROPERITONEAL LIMITED: DATE AND TIME: 10/8/2018 at 1:00 PM. CLINICAL HISTORY: HYDRONEPHROSIS. RENAL FAILURE, ACUTE (KIDNEY INJURY). COMPARISONS:  None. FINDINGS: Right kidney: 8.6 cm. Left kidney: 8.5 cm. Echogenicity: Kidneys are relatively small. Echogenicity of the renal cortex is within normal range. Small renal cysts bilaterally. No hydronephrosis. Hydronephrosis: There is no hydronephrosis. Mass: No solid renal mass. No renal calculi. NO HYDRONEPHROSIS. SMALL BILATERAL KIDNEYS WITHOUT SIGNS OF MEDICAL RENAL DISEASE. Us Carotid Artery Bilateral    Result Date: 10/8/2018  EXAMINATION:  CAROTID DUPLEX ULTRASONOGRAPHY: CLINICAL HISTORY:  SYNCOPE. COMPARISONS:  February 10, 2011. TECHNIQUE:  B-mode, color flow and spectral Doppler.  FINDINGS:  ARTERIAL BLOOD FLOW VELOCITY RIGHT PS                                               Prox CCA    114 cm/s

## 2018-10-15 NOTE — PROGRESS NOTES
Physical Therapy  Facility/Department: [facility] MED SURG [id number]  Physical Therapy Discharge      NAME: Ovidio Panchal    : 1932 (80 y.o.)  MRN: [id number]    Account: [de-identified]  Gender: female      Patient has been discharged from acute care hospital. DC patient from current PT program.      Electronically signed by Georgia Ritchie PT on 10/15/18 at 12:39 PM

## 2018-11-05 ENCOUNTER — OFFICE VISIT (OUTPATIENT)
Dept: CARDIOLOGY CLINIC | Age: 83
End: 2018-11-05
Payer: MEDICARE

## 2018-11-05 VITALS
HEART RATE: 60 BPM | OXYGEN SATURATION: 97 % | WEIGHT: 134 LBS | DIASTOLIC BLOOD PRESSURE: 82 MMHG | SYSTOLIC BLOOD PRESSURE: 172 MMHG | BODY MASS INDEX: 22.88 KG/M2 | HEIGHT: 64 IN | RESPIRATION RATE: 16 BRPM

## 2018-11-05 DIAGNOSIS — R06.09 DOE (DYSPNEA ON EXERTION): ICD-10-CM

## 2018-11-05 DIAGNOSIS — I10 ESSENTIAL HYPERTENSION: Chronic | ICD-10-CM

## 2018-11-05 DIAGNOSIS — Z95.5 HISTORY OF CORONARY ARTERY STENT PLACEMENT: ICD-10-CM

## 2018-11-05 DIAGNOSIS — I51.9 HEART DISEASE: ICD-10-CM

## 2018-11-05 DIAGNOSIS — I49.5 SICK SINUS SYNDROME (HCC): ICD-10-CM

## 2018-11-05 DIAGNOSIS — R55 SYNCOPE AND COLLAPSE: ICD-10-CM

## 2018-11-05 DIAGNOSIS — Z95.0 STATUS POST PLACEMENT OF OTHER CARDIAC PACEMAKER: ICD-10-CM

## 2018-11-05 PROCEDURE — 99214 OFFICE O/P EST MOD 30 MIN: CPT | Performed by: INTERNAL MEDICINE

## 2018-11-05 RX ORDER — AMLODIPINE BESYLATE 10 MG/1
10 TABLET ORAL DAILY
Qty: 30 TABLET | Refills: 5 | Status: SHIPPED | OUTPATIENT
Start: 2018-11-05 | End: 2019-06-30 | Stop reason: SDUPTHER

## 2018-11-05 ASSESSMENT — ENCOUNTER SYMPTOMS
COUGH: 0
GASTROINTESTINAL NEGATIVE: 1
EYES NEGATIVE: 1
SHORTNESS OF BREATH: 0
CHEST TIGHTNESS: 0
WHEEZING: 0
NAUSEA: 0
RESPIRATORY NEGATIVE: 1
BLOOD IN STOOL: 0
STRIDOR: 0

## 2018-11-05 NOTE — PROGRESS NOTES
Subsequent Progress Note  Patient: Sita Valdes  YOB: 1932  MRN: [id number]    Chief Complaint: cad jaramillo htn  Chief Complaint   Patient presents with    Shortness of Breath     f/u Post Cath    Loss of Consciousness       CV Data:  CAD PCI 2008 8/2017 echo 60-65  10/10/2018 CATH: Prior LAD stent mild to mod ISR, CX- mid 70-80 RCA diffuse-   CX CARLOS placed. PAULINE dissected and also required stent. 10/018 CUS mild    Subjective/HPI: no cp no sob no falls feels tired and dizzy     EKG:    Past Medical History:   Diagnosis Date    Anxiety     Depression     Heart disease     Hypertension     Hypothyroidism        Past Surgical History:   Procedure Laterality Date    BREAST CYST ASPIRATION Right 1/17/14    U/S guided core bx of the right breast    HYSTERECTOMY      PACEMAKER INSERTION         History reviewed. No pertinent family history. Social History     Social History    Marital status:       Spouse name: N/A    Number of children: N/A    Years of education: N/A     Social History Main Topics    Smoking status: Never Smoker    Smokeless tobacco: Never Used    Alcohol use No    Drug use: No    Sexual activity: No     Other Topics Concern    None     Social History Narrative    None       Allergies   Allergen Reactions    Other      Nectarines    Sulfa Antibiotics Rash       Current Outpatient Prescriptions   Medication Sig Dispense Refill    amLODIPine (NORVASC) 10 MG tablet Take 1 tablet by mouth daily 30 tablet 5    pravastatin (PRAVACHOL) 40 MG tablet Take 1 tablet by mouth nightly 30 tablet 6    aspirin EC 81 MG EC tablet Take 1 tablet by mouth daily 30 tablet 3    potassium bicarbonate (EFFER-K) 25 MEQ disintegrating tablet Take 1 tablet by mouth daily 60 tablet 5    clopidogrel (PLAVIX) 75 MG tablet Take 1 tablet by mouth daily 30 tablet 11    levothyroxine (SYNTHROID) 50 MCG tablet Take 1 tablet by mouth Daily 30 tablet 11    chlorthalidone (HYGROTON) 25 MG exhibits no tenderness. Abdominal: Soft. Bowel sounds are normal. There is no tenderness. Musculoskeletal: Normal range of motion. She exhibits no edema or tenderness. Neurological: She is alert and oriented to person, place, and time. Skin: Skin is warm. No cyanosis. Nails show no clubbing.        LABS:  CBC:   Lab Results   Component Value Date    WBC 5.3 10/12/2018    RBC 2.87 10/12/2018    HGB 9.1 10/12/2018    HCT 27.2 10/12/2018    MCV 94.8 10/12/2018    MCH 31.6 10/12/2018    MCHC 33.4 10/12/2018    RDW 14.9 10/12/2018     10/12/2018    MPV 9.1 07/18/2014     Lipids:  Lab Results   Component Value Date    CHOL 174 02/07/2018    CHOL 148 08/08/2017    CHOL 217 (H) 07/18/2014     Lab Results   Component Value Date    TRIG 94 02/07/2018    TRIG 82 08/08/2017    TRIG 147 07/18/2014     Lab Results   Component Value Date    HDL 70 (H) 02/07/2018    HDL 60 (H) 08/08/2017    HDL 85 (H) 07/18/2014     Lab Results   Component Value Date    LDLCALC 85 02/07/2018    LDLCALC 72 08/08/2017    LDLCALC 103 07/18/2014     No results found for: LABVLDL, VLDL  Lab Results   Component Value Date    CHOLHDLRATIO 3.2 11/24/2012     CMP:    Lab Results   Component Value Date     10/12/2018    K 4.8 10/12/2018    K 4.4 10/11/2018     10/12/2018    CO2 23 10/12/2018    BUN 21 10/12/2018    CREATININE 1.15 10/12/2018    GFRAA 54.1 10/12/2018    LABGLOM 44.7 10/12/2018    GLUCOSE 98 10/12/2018    PROT 7.5 10/07/2018    LABALBU 4.0 10/07/2018    CALCIUM 8.1 10/12/2018    BILITOT 0.4 10/07/2018    ALKPHOS 62 10/07/2018    AST 24 10/07/2018    ALT 12 10/07/2018     BMP:    Lab Results   Component Value Date     10/12/2018    K 4.8 10/12/2018    K 4.4 10/11/2018     10/12/2018    CO2 23 10/12/2018    BUN 21 10/12/2018    LABALBU 4.0 10/07/2018    CREATININE 1.15 10/12/2018    CALCIUM 8.1 10/12/2018    GFRAA 54.1 10/12/2018    LABGLOM 44.7 10/12/2018    GLUCOSE 98 10/12/2018     Magnesium:    Lab

## 2018-11-06 ENCOUNTER — HOSPITAL ENCOUNTER (OUTPATIENT)
Dept: CARDIOLOGY | Age: 83
Discharge: HOME | End: 2018-11-06
Payer: MEDICARE

## 2018-11-06 PROCEDURE — 93296 REM INTERROG EVL PM/IDS: CPT

## 2019-01-03 RX ORDER — POTASSIUM BICARBONATE 978 MG/1
TABLET, EFFERVESCENT ORAL
Qty: 90 TABLET | Refills: 0 | Status: SHIPPED | OUTPATIENT
Start: 2019-01-03 | End: 2019-05-04 | Stop reason: SDUPTHER

## 2019-02-05 ENCOUNTER — HOSPITAL ENCOUNTER (OUTPATIENT)
Dept: CARDIOLOGY | Age: 84
Discharge: HOME | End: 2019-02-05
Payer: MEDICARE

## 2019-02-05 PROCEDURE — 93280 PM DEVICE PROGR EVAL DUAL: CPT

## 2019-03-04 ENCOUNTER — OFFICE VISIT (OUTPATIENT)
Dept: CARDIOLOGY CLINIC | Age: 84
End: 2019-03-04
Payer: MEDICARE

## 2019-03-04 VITALS
DIASTOLIC BLOOD PRESSURE: 72 MMHG | HEIGHT: 64 IN | HEART RATE: 60 BPM | OXYGEN SATURATION: 98 % | SYSTOLIC BLOOD PRESSURE: 134 MMHG | WEIGHT: 140 LBS | BODY MASS INDEX: 23.9 KG/M2 | RESPIRATION RATE: 16 BRPM

## 2019-03-04 DIAGNOSIS — I10 ESSENTIAL HYPERTENSION: Primary | Chronic | ICD-10-CM

## 2019-03-04 DIAGNOSIS — R55 SYNCOPE AND COLLAPSE: ICD-10-CM

## 2019-03-04 DIAGNOSIS — R06.09 DOE (DYSPNEA ON EXERTION): ICD-10-CM

## 2019-03-04 DIAGNOSIS — Z95.5 HISTORY OF CORONARY ARTERY STENT PLACEMENT: ICD-10-CM

## 2019-03-04 DIAGNOSIS — I49.5 SICK SINUS SYNDROME (HCC): ICD-10-CM

## 2019-03-04 DIAGNOSIS — Z95.0 PACEMAKER: ICD-10-CM

## 2019-03-04 DIAGNOSIS — I73.9 CLAUDICATION (HCC): ICD-10-CM

## 2019-03-04 PROBLEM — Z98.61 HISTORY OF PTCA 1: Status: ACTIVE | Noted: 2019-03-04

## 2019-03-04 PROCEDURE — 99214 OFFICE O/P EST MOD 30 MIN: CPT | Performed by: INTERNAL MEDICINE

## 2019-03-04 ASSESSMENT — ENCOUNTER SYMPTOMS
GASTROINTESTINAL NEGATIVE: 1
COUGH: 0
WHEEZING: 0
CHEST TIGHTNESS: 0
NAUSEA: 0
SHORTNESS OF BREATH: 1
BLOOD IN STOOL: 0
EYES NEGATIVE: 1
STRIDOR: 0

## 2019-03-18 ENCOUNTER — HOSPITAL ENCOUNTER (OUTPATIENT)
Dept: ULTRASOUND IMAGING | Age: 84
Discharge: HOME | End: 2019-03-20
Payer: MEDICARE

## 2019-03-18 DIAGNOSIS — I73.9 CLAUDICATION (HCC): ICD-10-CM

## 2019-03-18 LAB
HCT VFR BLD CALC: 35.3 % (ref 37–47)
HEMOGLOBIN: 11.9 G/DL (ref 12–16)
MCH RBC QN AUTO: 31 PG (ref 27–31.3)
MCHC RBC AUTO-ENTMCNC: 33.6 % (ref 33–37)
MCV RBC AUTO: 92.1 FL (ref 82–100)
PDW BLD-RTO: 17.1 % (ref 11.5–14.5)
PLATELET # BLD: 162 K/UL (ref 130–400)
RBC # BLD: 3.83 M/UL (ref 4.2–5.4)
WBC # BLD: 5 K/UL (ref 4.8–10.8)

## 2019-03-18 PROCEDURE — 93924 LWR XTR VASC STDY BILAT: CPT

## 2019-03-18 PROCEDURE — 93924 LWR XTR VASC STDY BILAT: CPT | Performed by: INTERNAL MEDICINE

## 2019-03-26 ENCOUNTER — TELEPHONE (OUTPATIENT)
Dept: CARDIOLOGY CLINIC | Age: 84
End: 2019-03-26

## 2019-05-06 RX ORDER — POTASSIUM BICARBONATE 978 MG/1
TABLET, EFFERVESCENT ORAL
Qty: 90 TABLET | Refills: 1 | Status: SHIPPED | OUTPATIENT
Start: 2019-05-06 | End: 2019-12-04 | Stop reason: SDUPTHER

## 2019-05-07 ENCOUNTER — HOSPITAL ENCOUNTER (OUTPATIENT)
Dept: CARDIOLOGY | Age: 84
Discharge: HOME | End: 2019-05-07
Payer: MEDICARE

## 2019-05-07 PROCEDURE — 93296 REM INTERROG EVL PM/IDS: CPT

## 2019-06-06 ENCOUNTER — OFFICE VISIT (OUTPATIENT)
Dept: CARDIOLOGY CLINIC | Age: 84
End: 2019-06-06
Payer: MEDICARE

## 2019-06-06 VITALS
HEART RATE: 60 BPM | WEIGHT: 144 LBS | BODY MASS INDEX: 24.59 KG/M2 | DIASTOLIC BLOOD PRESSURE: 70 MMHG | HEIGHT: 64 IN | SYSTOLIC BLOOD PRESSURE: 128 MMHG | RESPIRATION RATE: 16 BRPM | OXYGEN SATURATION: 99 %

## 2019-06-06 DIAGNOSIS — Z95.5 HISTORY OF CORONARY ARTERY STENT PLACEMENT: ICD-10-CM

## 2019-06-06 DIAGNOSIS — I25.119 CORONARY ARTERY DISEASE INVOLVING NATIVE CORONARY ARTERY OF NATIVE HEART WITH ANGINA PECTORIS (HCC): ICD-10-CM

## 2019-06-06 DIAGNOSIS — R06.09 DOE (DYSPNEA ON EXERTION): ICD-10-CM

## 2019-06-06 DIAGNOSIS — I10 ESSENTIAL HYPERTENSION: Primary | Chronic | ICD-10-CM

## 2019-06-06 DIAGNOSIS — Z95.0 PACEMAKER: ICD-10-CM

## 2019-06-06 PROCEDURE — 99214 OFFICE O/P EST MOD 30 MIN: CPT | Performed by: INTERNAL MEDICINE

## 2019-06-06 ASSESSMENT — ENCOUNTER SYMPTOMS
BLOOD IN STOOL: 0
RESPIRATORY NEGATIVE: 1
EYES NEGATIVE: 1
NAUSEA: 0
COUGH: 0
WHEEZING: 0
SHORTNESS OF BREATH: 0
STRIDOR: 0
CHEST TIGHTNESS: 0
GASTROINTESTINAL NEGATIVE: 1

## 2019-06-06 NOTE — PROGRESS NOTES
Neurological: Negative for dizziness, syncope, weakness and light-headedness. Hematological: Negative. Psychiatric/Behavioral: Negative. Physical Examination:    /70 (Site: Right Upper Arm, Position: Sitting, Cuff Size: Large Adult)   Pulse 60   Resp 16   Ht 5' 4\" (1.626 m)   Wt 144 lb (65.3 kg)   SpO2 99%   BMI 24.72 kg/m²    Physical Exam   Constitutional: She appears healthy. No distress. HENT:   Normal cephalic and Atraumatic   Eyes: Pupils are equal, round, and reactive to light. Neck: Normal range of motion and thyroid normal. Neck supple. No JVD present. No neck adenopathy. No thyromegaly present. Cardiovascular: Normal rate, regular rhythm, intact distal pulses and normal pulses. Murmur heard. Pulmonary/Chest: Effort normal and breath sounds normal. She has no wheezes. She has no rales. She exhibits no tenderness. Abdominal: Soft. Bowel sounds are normal. There is no tenderness. Musculoskeletal: Normal range of motion. She exhibits no edema or tenderness. Neurological: She is alert and oriented to person, place, and time. Skin: Skin is warm. No cyanosis. Nails show no clubbing.        LABS:  CBC:   Lab Results   Component Value Date    WBC 5.0 03/18/2019    RBC 3.83 03/18/2019    HGB 11.9 03/18/2019    HCT 35.3 03/18/2019    MCV 92.1 03/18/2019    MCH 31.0 03/18/2019    MCHC 33.6 03/18/2019    RDW 17.1 03/18/2019     03/18/2019    MPV 9.1 07/18/2014     Lipids:  Lab Results   Component Value Date    CHOL 174 02/07/2018    CHOL 148 08/08/2017    CHOL 217 (H) 07/18/2014     Lab Results   Component Value Date    TRIG 94 02/07/2018    TRIG 82 08/08/2017    TRIG 147 07/18/2014     Lab Results   Component Value Date    HDL 70 (H) 02/07/2018    HDL 60 (H) 08/08/2017    HDL 85 (H) 07/18/2014     Lab Results   Component Value Date    LDLCALC 85 02/07/2018    LDLCALC 72 08/08/2017    LDLCALC 103 07/18/2014     No results found for: LABVLDL, VLDL  Lab Results Component Value Date    CHOLHDLRATIO 3.2 11/24/2012     CMP:    Lab Results   Component Value Date     03/18/2019    K 4.8 03/18/2019    K 4.4 10/11/2018     03/18/2019    CO2 26 03/18/2019    BUN 30 03/18/2019    CREATININE 1.30 03/18/2019    GFRAA 46.9 03/18/2019    LABGLOM 38.8 03/18/2019    GLUCOSE 98 03/18/2019    PROT 7.6 11/21/2018    LABALBU 4.3 03/18/2019    CALCIUM 9.4 03/18/2019    BILITOT 0.4 11/21/2018    ALKPHOS 59 11/21/2018    AST 24 11/21/2018    ALT 12 11/21/2018     BMP:    Lab Results   Component Value Date     03/18/2019    K 4.8 03/18/2019    K 4.4 10/11/2018     03/18/2019    CO2 26 03/18/2019    BUN 30 03/18/2019    LABALBU 4.3 03/18/2019    CREATININE 1.30 03/18/2019    CALCIUM 9.4 03/18/2019    GFRAA 46.9 03/18/2019    LABGLOM 38.8 03/18/2019    GLUCOSE 98 03/18/2019     Magnesium:    Lab Results   Component Value Date    MG 2.1 11/21/2018     TSH:  Lab Results   Component Value Date    TSH 2.380 11/21/2018       Patient Active Problem List   Diagnosis    Depression    Anxiety    Hypothyroidism    Hypertension    Heart disease    Osteoarthritis of shoulder    Syncope and collapse    Status post placement of other cardiac pacemaker    Sick sinus syndrome (HCC)    History of coronary artery stent placement    Essential hypertension    GRAHAM (dyspnea on exertion)    Pacemaker    History of PTCA 1    Coronary artery disease involving native coronary artery of native heart with angina pectoris (HCC)       There are no discontinued medications. Modified Medications    No medications on file       No orders of the defined types were placed in this encounter. Assessment/Plan:    1. Essential hypertension  Stable     2. History of coronary artery stent placement  No angina     3. GRAHAM (dyspnea on exertion)   stable     4. Pacemaker   Interrogate     5.  Coronary artery disease involving native coronary artery of native heart with angina pectoris ([facility] [address] No aninga    6. Hip pain - likely arthritis. Counseling:  Heart Healthy Lifestyle, Low Salt Diet, Volume Restriction 1500cc per day, Take Precautions to Prevent Falls, Regular Exercise and Walk Daily    Return in about 3 months (around 9/6/2019) for Cardiovascular care. .      Electronically signed by Sydney Saenz MD on 6/6/2019 at 2:48 PM

## 2019-08-06 ENCOUNTER — HOSPITAL ENCOUNTER (OUTPATIENT)
Dept: CARDIOLOGY | Age: 84
Discharge: HOME | End: 2019-08-06
Payer: MEDICARE

## 2019-08-06 PROCEDURE — 93280 PM DEVICE PROGR EVAL DUAL: CPT

## 2019-09-11 ENCOUNTER — OFFICE VISIT (OUTPATIENT)
Dept: CARDIOLOGY CLINIC | Age: 84
End: 2019-09-11
Payer: MEDICARE

## 2019-09-11 VITALS
BODY MASS INDEX: 24.31 KG/M2 | WEIGHT: 141.6 LBS | OXYGEN SATURATION: 98 % | DIASTOLIC BLOOD PRESSURE: 62 MMHG | SYSTOLIC BLOOD PRESSURE: 124 MMHG | HEART RATE: 58 BPM | RESPIRATION RATE: 12 BRPM

## 2019-09-11 DIAGNOSIS — Z95.0 PACEMAKER: ICD-10-CM

## 2019-09-11 DIAGNOSIS — E78.5 DYSLIPIDEMIA: ICD-10-CM

## 2019-09-11 DIAGNOSIS — I10 ESSENTIAL HYPERTENSION: Primary | Chronic | ICD-10-CM

## 2019-09-11 DIAGNOSIS — I25.119 CORONARY ARTERY DISEASE INVOLVING NATIVE CORONARY ARTERY OF NATIVE HEART WITH ANGINA PECTORIS (HCC): ICD-10-CM

## 2019-09-11 DIAGNOSIS — Z95.5 HISTORY OF CORONARY ARTERY STENT PLACEMENT: ICD-10-CM

## 2019-09-11 DIAGNOSIS — I49.5 SICK SINUS SYNDROME (HCC): ICD-10-CM

## 2019-09-11 PROCEDURE — 99214 OFFICE O/P EST MOD 30 MIN: CPT | Performed by: INTERNAL MEDICINE

## 2019-09-11 ASSESSMENT — ENCOUNTER SYMPTOMS
CHEST TIGHTNESS: 0
EYES NEGATIVE: 1
SHORTNESS OF BREATH: 0
STRIDOR: 0
BLOOD IN STOOL: 0
RESPIRATORY NEGATIVE: 1
CONSTIPATION: 1
WHEEZING: 0
NAUSEA: 0
COUGH: 0

## 2019-09-11 NOTE — PROGRESS NOTES
status: None    Intimate partner violence:     Fear of current or ex partner: None     Emotionally abused: None     Physically abused: None     Forced sexual activity: None   Other Topics Concern    None   Social History Narrative    None       Allergies   Allergen Reactions    Other      Nectarines    Sulfa Antibiotics Rash       Current Outpatient Medications   Medication Sig Dispense Refill    amLODIPine (NORVASC) 10 MG tablet take 1 tablet by mouth once daily 90 tablet 3    KLOR-CON/EF 25 MEQ disintegrating tablet take 1 tablet by mouth once daily 90 tablet 1    pravastatin (PRAVACHOL) 40 MG tablet Take 1 tablet by mouth nightly 30 tablet 6    aspirin EC 81 MG EC tablet Take 1 tablet by mouth daily 30 tablet 3    clopidogrel (PLAVIX) 75 MG tablet Take 1 tablet by mouth daily 30 tablet 11    levothyroxine (SYNTHROID) 50 MCG tablet Take 1 tablet by mouth Daily 30 tablet 11    chlorthalidone (HYGROTON) 25 MG tablet Take 25 mg by mouth daily       carvedilol (COREG) 12.5 MG tablet Take 25 mg by mouth 2 times daily       NITROSTAT 0.4 MG SL tablet place 1 tablet under the tongue if needed every 5 minutes for chest pain for 3 doses IF NO RELIEF AFTER 3RD DOSE CALL PRESCRIBER . 25 tablet 11    lisinopril (PRINIVIL;ZESTRIL) 20 MG tablet Take 20 mg by mouth daily.  sertraline (ZOLOFT) 50 MG tablet Take 100 mg by mouth daily       LORazepam (ATIVAN) 1 MG tablet Take 1 mg by mouth every 6 hours as needed. No current facility-administered medications for this visit. Review of Systems:   Review of Systems   Constitutional: Positive for fatigue. Negative for diaphoresis. HENT: Negative. Eyes: Negative. Respiratory: Negative. Negative for cough, chest tightness, shortness of breath, wheezing and stridor. Cardiovascular: Positive for palpitations. Negative for chest pain and leg swelling. Gastrointestinal: Positive for constipation. Negative for blood in stool and nausea.

## 2019-11-05 ENCOUNTER — HOSPITAL ENCOUNTER (OUTPATIENT)
Dept: CARDIOLOGY | Age: 84
Discharge: HOME | End: 2019-11-05
Payer: MEDICARE

## 2019-11-05 PROCEDURE — 93296 REM INTERROG EVL PM/IDS: CPT

## 2019-11-21 NOTE — ED NOTES
Bed: 18  Expected date: 2/27/18  Expected time: 12:00 AM  Means of arrival: [facility]  Comments:  81yo F, fall, shoulder pain.      Gayle De Souza RN  02/28/18 [id number]
Patient arouseable to voice. Pt continues to sleep. Pt denies pain at this time.       Claudia Loera RN  02/28/18 0107
Patient awakes to voice. Pt goes back to sleep after conversation. Pt denies pain at this time. VSS.       Krysta Eli RN  02/28/18 0083
Patient is alert and oriented. Pt is drowsy. IV fluids dcd. IV dcd.       Kartik Maddox RN  02/28/18 1951
Patient to Xray via cart.       Fely Castillo RN  02/28/18 0025
Pt assisted on getting dressed. Pt is alert and oriented but drowsy. Pt is able to hold conversation. Family is at bedside. Discharge instructions given to pt and pt's family. Pt is placed in wheelchair. Pt requesting to go to the bathroom. Pt is wheeled to the bathroom.       Beverly Gusman RN  02/28/18 9625
Pt states she is nauseous. Irina FIORE aware. Orders for Zofran. Pt urinates. Pt threw up a small amount of food. Pt states she feels better.       Jose Meredith RN  02/28/18 6208
RIGHT SHOULDER BACK IN PLACE PER ARGELIA FIORE. XRAY CALLED TO VERIFY PLACEMENT. SLING PLACE ON PATIENT.       Edyta Ignacio RN  02/28/18 1898
stretcher

## 2019-12-04 RX ORDER — POTASSIUM BICARBONATE 978 MG/1
TABLET, EFFERVESCENT ORAL
Qty: 90 TABLET | Refills: 3 | Status: SHIPPED | OUTPATIENT
Start: 2019-12-04 | End: 2020-12-21

## 2020-01-10 ENCOUNTER — OFFICE VISIT (OUTPATIENT)
Dept: CARDIOLOGY CLINIC | Age: 85
End: 2020-01-10
Payer: MEDICARE

## 2020-01-10 VITALS
DIASTOLIC BLOOD PRESSURE: 61 MMHG | SYSTOLIC BLOOD PRESSURE: 110 MMHG | OXYGEN SATURATION: 98 % | RESPIRATION RATE: 16 BRPM | WEIGHT: 143.4 LBS | HEART RATE: 62 BPM | BODY MASS INDEX: 24.61 KG/M2

## 2020-01-10 PROCEDURE — 99214 OFFICE O/P EST MOD 30 MIN: CPT | Performed by: INTERNAL MEDICINE

## 2020-01-10 PROCEDURE — 93000 ELECTROCARDIOGRAM COMPLETE: CPT | Performed by: INTERNAL MEDICINE

## 2020-01-10 ASSESSMENT — ENCOUNTER SYMPTOMS
RESPIRATORY NEGATIVE: 1
WHEEZING: 0
STRIDOR: 0
NAUSEA: 0
BLOOD IN STOOL: 0
SHORTNESS OF BREATH: 0
COUGH: 0
CONSTIPATION: 1
CHEST TIGHTNESS: 0
EYES NEGATIVE: 1

## 2020-01-10 NOTE — PROGRESS NOTES
member of club or organization: None     Attends meetings of clubs or organizations: None     Relationship status: None    Intimate partner violence:     Fear of current or ex partner: None     Emotionally abused: None     Physically abused: None     Forced sexual activity: None   Other Topics Concern    None   Social History Narrative    None       Allergies   Allergen Reactions    Other      Nectarines    Sulfa Antibiotics Rash       Current Outpatient Medications   Medication Sig Dispense Refill    KLOR-CON/EF 25 MEQ disintegrating tablet take 1 packet by mouth once daily 90 tablet 3    amLODIPine (NORVASC) 10 MG tablet take 1 tablet by mouth once daily 90 tablet 3    pravastatin (PRAVACHOL) 40 MG tablet Take 1 tablet by mouth nightly 30 tablet 6    aspirin EC 81 MG EC tablet Take 1 tablet by mouth daily 30 tablet 3    clopidogrel (PLAVIX) 75 MG tablet Take 1 tablet by mouth daily 30 tablet 11    levothyroxine (SYNTHROID) 50 MCG tablet Take 1 tablet by mouth Daily 30 tablet 11    chlorthalidone (HYGROTON) 25 MG tablet Take 25 mg by mouth daily       carvedilol (COREG) 12.5 MG tablet Take 25 mg by mouth 2 times daily       NITROSTAT 0.4 MG SL tablet place 1 tablet under the tongue if needed every 5 minutes for chest pain for 3 doses IF NO RELIEF AFTER 3RD DOSE CALL PRESCRIBER . 25 tablet 11    lisinopril (PRINIVIL;ZESTRIL) 20 MG tablet Take 20 mg by mouth daily.  sertraline (ZOLOFT) 50 MG tablet Take 100 mg by mouth daily       LORazepam (ATIVAN) 1 MG tablet Take 1 mg by mouth every 6 hours as needed. No current facility-administered medications for this visit. Review of Systems:   Review of Systems   Constitutional: Positive for fatigue. Negative for diaphoresis. HENT: Negative. Eyes: Negative. Respiratory: Negative. Negative for cough, chest tightness, shortness of breath, wheezing and stridor. Cardiovascular: Positive for palpitations.  Negative for chest pain and leg swelling. Gastrointestinal: Positive for constipation. Negative for blood in stool and nausea. Genitourinary: Negative. Musculoskeletal: Positive for arthralgias. Skin: Negative. Neurological: Positive for weakness. Negative for dizziness, syncope and light-headedness. Hematological: Negative. Psychiatric/Behavioral: Negative. Physical Examination:    /61 (Site: Left Upper Arm, Position: Sitting, Cuff Size: Medium Adult)   Pulse 62   Resp 16   Wt 143 lb 6.4 oz (65 kg)   SpO2 98%   BMI 24.61 kg/m²    Physical Exam   Constitutional: She appears healthy. No distress. HENT:   Normal cephalic and Atraumatic   Eyes: Pupils are equal, round, and reactive to light. Neck: Normal range of motion and thyroid normal. Neck supple. No JVD present. No neck adenopathy. No thyromegaly present. Cardiovascular: Normal rate, regular rhythm, intact distal pulses and normal pulses. Murmur heard. Pulmonary/Chest: Effort normal and breath sounds normal. She has no wheezes. She has no rales. She exhibits no tenderness. Abdominal: Soft. Bowel sounds are normal. There is no tenderness. Musculoskeletal: Normal range of motion. General: Edema (trace) present. No tenderness. Neurological: She is alert and oriented to person, place, and time. Skin: Skin is warm. No cyanosis. Nails show no clubbing.        LABS:  CBC:   Lab Results   Component Value Date    WBC 5.5 12/04/2019    RBC 4.02 12/04/2019    HGB 12.6 12/04/2019    HCT 37.8 12/04/2019    MCV 93.9 12/04/2019    MCH 31.3 12/04/2019    MCHC 33.3 12/04/2019    RDW 15.8 12/04/2019     12/04/2019    MPV 9.1 07/18/2014     Lipids:  Lab Results   Component Value Date    CHOL 178 06/15/2019    CHOL 174 02/07/2018    CHOL 148 08/08/2017     Lab Results   Component Value Date    TRIG 52 06/15/2019    TRIG 94 02/07/2018    TRIG 82 08/08/2017     Lab Results   Component Value Date    HDL 76 (H) 06/15/2019    HDL 70 (H) artery stent placement  No angina     3. Sick sinus syndrome ([facility] [address]     4. Coronary artery disease involving native coronary artery of native heart with angina pectoris ([facility] [address]   no angina     5. Dyslipidemia   statin     6. Pacemaker   interrgation reviewed. 7. CKD- f/u Dr. Lux Amaro   Counseling:  Heart Healthy Lifestyle, Low Salt Diet, Take Precautions to Prevent Falls, Regular Exercise and Walk Daily    Return in about 4 months (around 5/10/2020) for Cardiovascular care. .      Electronically signed by Surendra Tapia MD on 1/10/2020 at 2:53 PM

## 2020-02-04 ENCOUNTER — HOSPITAL ENCOUNTER (OUTPATIENT)
Dept: CARDIOLOGY | Age: 85
Discharge: HOME | End: 2020-02-04
Payer: MEDICARE

## 2020-02-04 PROCEDURE — 93280 PM DEVICE PROGR EVAL DUAL: CPT

## 2020-05-05 ENCOUNTER — HOSPITAL ENCOUNTER (OUTPATIENT)
Dept: CARDIOLOGY | Age: 85
Discharge: HOME | End: 2020-05-05
Payer: MEDICARE

## 2020-05-05 PROCEDURE — 93296 REM INTERROG EVL PM/IDS: CPT

## 2020-08-05 ENCOUNTER — HOSPITAL ENCOUNTER (OUTPATIENT)
Dept: CARDIOLOGY | Age: 85
Discharge: HOME | End: 2020-08-05
Payer: MEDICARE

## 2020-08-05 PROCEDURE — 93296 REM INTERROG EVL PM/IDS: CPT

## 2020-08-19 ENCOUNTER — OFFICE VISIT (OUTPATIENT)
Dept: CARDIOLOGY CLINIC | Age: 85
End: 2020-08-19
Payer: MEDICARE

## 2020-08-19 VITALS
HEART RATE: 60 BPM | BODY MASS INDEX: 23.69 KG/M2 | WEIGHT: 138 LBS | DIASTOLIC BLOOD PRESSURE: 60 MMHG | SYSTOLIC BLOOD PRESSURE: 112 MMHG | OXYGEN SATURATION: 99 % | RESPIRATION RATE: 18 BRPM

## 2020-08-19 PROCEDURE — 99214 OFFICE O/P EST MOD 30 MIN: CPT | Performed by: INTERNAL MEDICINE

## 2020-08-19 RX ORDER — MULTIVIT-MIN/IRON/FOLIC ACID/K 18-600-40
CAPSULE ORAL
COMMUNITY

## 2020-08-19 RX ORDER — CARVEDILOL 25 MG/1
25 TABLET ORAL 2 TIMES DAILY WITH MEALS
COMMUNITY
End: 2021-08-17 | Stop reason: ALTCHOICE

## 2020-08-19 ASSESSMENT — ENCOUNTER SYMPTOMS
WHEEZING: 0
SHORTNESS OF BREATH: 0
STRIDOR: 0
RESPIRATORY NEGATIVE: 1
NAUSEA: 0
CHEST TIGHTNESS: 0
EYES NEGATIVE: 1
COUGH: 0
CONSTIPATION: 1
BLOOD IN STOOL: 0

## 2020-08-19 NOTE — PROGRESS NOTES
Subsequent Progress Note  Patient: Manny Henley  YOB: 1932  MRN: [id number]    Chief Complaint: cad palp htn   Chief Complaint   Patient presents with    Follow-up     7 month    Coronary Artery Disease    Hypertension       CV Data:  CAD PCI 2008 8/2017 echo 60-65  10/10/2018 CATH: Prior LAD stent mild to mod ISR, CX- mid 70-80 RCA diffuse-   CX CARLOS placed.  PAULINE dissected and also required stent. 10/018 CUS mild  PPM  3/2019 PVR negative     Subjective/HPI: constipated and taking laxatives. No cp no jaramillo occ dizzy no recent falls no bleed    1/10/2020 feels well no co no sob eats well no falls no bleed. 8/19/2020 no cp no sob no falls no bleed takes meds eats well walks     EKG: SR LBBB    Past Medical History:   Diagnosis Date    Anxiety     CAD (coronary artery disease)     Depression     Heart disease     Hyperlipidemia     Hypertension     Hypothyroidism        Past Surgical History:   Procedure Laterality Date    BREAST CYST ASPIRATION Right 1/17/14    U/S guided core bx of the right breast    CORONARY ANGIOPLASTY WITH STENT PLACEMENT      DIAGNOSTIC CARDIAC CATH LAB PROCEDURE      HYSTERECTOMY      PACEMAKER INSERTION         History reviewed. No pertinent family history. Social History     Socioeconomic History    Marital status:       Spouse name: None    Number of children: None    Years of education: None    Highest education level: None   Occupational History    None   Social Needs    Financial resource strain: None    Food insecurity     Worry: None     Inability: None    Transportation needs     Medical: None     Non-medical: None   Tobacco Use    Smoking status: Never Smoker    Smokeless tobacco: Never Used   Substance and Sexual Activity    Alcohol use: No    Drug use: No    Sexual activity: Never   Lifestyle    Physical activity     Days per week: None     Minutes per session: None    Stress: None   Relationships    Social connections Talks on phone: None     Gets together: None     Attends Christianity service: None     Active member of club or organization: None     Attends meetings of clubs or organizations: None     Relationship status: None    Intimate partner violence     Fear of current or ex partner: None     Emotionally abused: None     Physically abused: None     Forced sexual activity: None   Other Topics Concern    None   Social History Narrative    None       Allergies   Allergen Reactions    Other      Nectarines    Sulfa Antibiotics Rash       Current Outpatient Medications   Medication Sig Dispense Refill    carvedilol (COREG) 25 MG tablet Take 25 mg by mouth 2 times daily (with meals)      Cholecalciferol (VITAMIN D) 50 MCG (2000 UT) CAPS capsule Take by mouth      KLOR-CON/EF 25 MEQ disintegrating tablet take 1 packet by mouth once daily 90 tablet 3    amLODIPine (NORVASC) 10 MG tablet take 1 tablet by mouth once daily 90 tablet 3    pravastatin (PRAVACHOL) 40 MG tablet Take 1 tablet by mouth nightly 30 tablet 6    aspirin EC 81 MG EC tablet Take 1 tablet by mouth daily 30 tablet 3    clopidogrel (PLAVIX) 75 MG tablet Take 1 tablet by mouth daily 30 tablet 11    levothyroxine (SYNTHROID) 50 MCG tablet Take 1 tablet by mouth Daily 30 tablet 11    chlorthalidone (HYGROTON) 25 MG tablet Take 25 mg by mouth daily       NITROSTAT 0.4 MG SL tablet place 1 tablet under the tongue if needed every 5 minutes for chest pain for 3 doses IF NO RELIEF AFTER 3RD DOSE CALL PRESCRIBER . 25 tablet 11    lisinopril (PRINIVIL;ZESTRIL) 20 MG tablet Take 20 mg by mouth daily.  sertraline (ZOLOFT) 50 MG tablet Take 100 mg by mouth daily       LORazepam (ATIVAN) 1 MG tablet Take 1 mg by mouth every 6 hours as needed. No current facility-administered medications for this visit. Review of Systems:   Review of Systems   Constitutional: Positive for fatigue. Negative for diaphoresis. HENT: Negative.     Eyes: Negative. Respiratory: Negative. Negative for cough, chest tightness, shortness of breath, wheezing and stridor. Cardiovascular: Positive for palpitations. Negative for chest pain and leg swelling. Gastrointestinal: Positive for constipation. Negative for blood in stool and nausea. Genitourinary: Negative. Musculoskeletal: Positive for arthralgias. Skin: Negative. Neurological: Positive for weakness. Negative for dizziness, syncope and light-headedness. Hematological: Negative. Psychiatric/Behavioral: Negative. Physical Examination:    /60 (Site: Right Upper Arm, Position: Sitting, Cuff Size: Medium Adult)   Pulse 60   Resp 18   Wt 138 lb (62.6 kg)   SpO2 99%   BMI 23.69 kg/m²    Physical Exam   Constitutional: She appears healthy. No distress. HENT:   Normal cephalic and Atraumatic   Eyes: Pupils are equal, round, and reactive to light. Neck: Normal range of motion and thyroid normal. Neck supple. No JVD present. No neck adenopathy. No thyromegaly present. Cardiovascular: Normal rate, regular rhythm, intact distal pulses and normal pulses. Murmur heard. Pulmonary/Chest: Effort normal and breath sounds normal. She has no wheezes. She has no rales. She exhibits no tenderness. Abdominal: Soft. Bowel sounds are normal. There is no abdominal tenderness. Musculoskeletal: Normal range of motion. General: Edema (trace) present. No tenderness. Neurological: She is alert and oriented to person, place, and time. Skin: Skin is warm. No cyanosis. Nails show no clubbing.        LABS:  CBC:   Lab Results   Component Value Date    WBC 5.1 06/11/2020    RBC 3.98 06/11/2020    HGB 12.3 06/11/2020    HCT 36.7 06/11/2020    MCV 92.2 06/11/2020    MCH 31.0 06/11/2020    MCHC 33.7 06/11/2020    RDW 16.8 06/11/2020     06/11/2020    MPV 9.1 07/18/2014     Lipids:  Lab Results   Component Value Date    CHOL 190 06/20/2020    CHOL 220 (H) 06/11/2020    CHOL 178 06/15/2019     Lab Results   Component Value Date    TRIG 62 06/20/2020    TRIG 68 06/11/2020    TRIG 52 06/15/2019     Lab Results   Component Value Date    HDL 82 (H) 06/20/2020    HDL 77 (H) 06/11/2020    HDL 76 (H) 06/15/2019     Lab Results   Component Value Date    LDLCALC 96 06/20/2020    LDLCALC 129 06/11/2020    LDLCALC 92 06/15/2019     No results found for: LABVLDL, VLDL  Lab Results   Component Value Date    CHOLHDLRATIO 3.2 11/24/2012     CMP:    Lab Results   Component Value Date     06/20/2020    K 4.5 06/20/2020    K 4.4 10/11/2018    CL 98 06/20/2020    CO2 26 06/20/2020    BUN 33 06/20/2020    CREATININE 1.57 06/20/2020    GFRAA 37.6 06/20/2020    LABGLOM 31.1 06/20/2020    GLUCOSE 108 06/20/2020    PROT 8.4 06/20/2020    LABALBU 4.3 06/20/2020    CALCIUM 10.1 06/20/2020    BILITOT 0.5 06/20/2020    ALKPHOS 63 06/20/2020    AST 20 06/20/2020    ALT 11 06/20/2020     BMP:    Lab Results   Component Value Date     06/20/2020    K 4.5 06/20/2020    K 4.4 10/11/2018    CL 98 06/20/2020    CO2 26 06/20/2020    BUN 33 06/20/2020    LABALBU 4.3 06/20/2020    CREATININE 1.57 06/20/2020    CALCIUM 10.1 06/20/2020    GFRAA 37.6 06/20/2020    LABGLOM 31.1 06/20/2020    GLUCOSE 108 06/20/2020     Magnesium:    Lab Results   Component Value Date    MG 2.1 11/21/2018     TSH:  Lab Results   Component Value Date    TSH 2.450 06/20/2020       Patient Active Problem List   Diagnosis    Depression    Anxiety    Hypothyroidism    Hypertension    Heart disease    Osteoarthritis of shoulder    Syncope and collapse    Status post placement of other cardiac pacemaker    Sick sinus syndrome (HCC)    History of coronary artery stent placement    Essential hypertension    GRAHAM (dyspnea on exertion)    Pacemaker    History of PTCA 1    Coronary artery disease involving native coronary artery of native heart with angina pectoris (HCC)    Dyslipidemia       Medications Discontinued During This Encounter   Medication Reason    carvedilol (COREG) 12.5 MG tablet DOSE ADJUSTMENT       Modified Medications    No medications on file       No orders of the defined types were placed in this encounter. Assessment/Plan:    1. Essential hypertension  Stable     2. History of coronary artery stent placement  No angina     3. Sick sinus syndrome (HCC) - PPM     4. Coronary artery disease involving native coronary artery of native heart with angina pectoris ([address]   no angina     5. Dyslipidemia   statin     6. Pacemaker   interrgation reviewed. - stable > 9yr battery life     7. CKD- f/u Dr. Pino Mcclellan   Counseling:  Heart Healthy Lifestyle, Low Salt Diet, Take Precautions to Prevent Falls, Regular Exercise and Walk Daily    Return in about 3 months (around 11/19/2020).       Electronically signed by Emily Townsend MD on 8/19/2020 at 1:55 PM

## 2020-10-15 RX ORDER — AMLODIPINE BESYLATE 10 MG/1
TABLET ORAL
Qty: 90 TABLET | Refills: 3 | Status: SHIPPED | OUTPATIENT
Start: 2020-10-15 | End: 2021-03-08

## 2020-11-03 PROBLEM — I10 HYPERTENSION: Status: RESOLVED | Noted: 2020-11-03 | Resolved: 2020-11-03

## 2020-11-04 ENCOUNTER — HOSPITAL ENCOUNTER (OUTPATIENT)
Dept: CARDIOLOGY | Age: 85
Discharge: HOME | End: 2020-11-04
Payer: MEDICARE

## 2020-11-04 PROCEDURE — 93280 PM DEVICE PROGR EVAL DUAL: CPT

## 2020-11-13 ENCOUNTER — OFFICE VISIT (OUTPATIENT)
Dept: CARDIOLOGY CLINIC | Age: 85
End: 2020-11-13
Payer: MEDICARE

## 2020-11-13 VITALS
OXYGEN SATURATION: 98 % | HEIGHT: 64 IN | SYSTOLIC BLOOD PRESSURE: 113 MMHG | RESPIRATION RATE: 18 BRPM | HEART RATE: 60 BPM | DIASTOLIC BLOOD PRESSURE: 59 MMHG | BODY MASS INDEX: 23.73 KG/M2 | WEIGHT: 139 LBS

## 2020-11-13 PROCEDURE — 99214 OFFICE O/P EST MOD 30 MIN: CPT | Performed by: INTERNAL MEDICINE

## 2020-11-13 ASSESSMENT — ENCOUNTER SYMPTOMS
NAUSEA: 0
COUGH: 0
EYES NEGATIVE: 1
RESPIRATORY NEGATIVE: 1
STRIDOR: 0
SHORTNESS OF BREATH: 0
BLOOD IN STOOL: 0
CHEST TIGHTNESS: 0
WHEEZING: 0
CONSTIPATION: 1

## 2020-11-13 NOTE — PROGRESS NOTES
Subsequent Progress Note  Patient: Sharita Notice  YOB: 1932  MRN: [id number]    Chief Complaint: cad palp htn   Chief Complaint   Patient presents with    3 Month Follow-Up    Coronary Artery Disease    Hypertension       CV Data:  CAD PCI 2008 8/2017 echo 60-65  10/10/2018 CATH: Prior LAD stent mild to mod ISR, CX- mid 70-80 RCA diffuse-   CX CARLOS placed.  PAULINE dissected and also required stent. 10/018 CUS mild  PPM  3/2019 PVR negative     Subjective/HPI: constipated and taking laxatives. No cp no jaramillo occ dizzy no recent falls no bleed    1/10/2020 feels well no co no sob eats well no falls no bleed. 8/19/2020 no cp no sob no falls no bleed takes meds eats well walks     11/13/2020 no cp no sob no falls no bleed. No plap eats well. EKG: SR LBBB    Past Medical History:   Diagnosis Date    Anxiety     CAD (coronary artery disease)     Depression     Heart disease     Hyperlipidemia     Hypertension     Hypothyroidism        Past Surgical History:   Procedure Laterality Date    BREAST CYST ASPIRATION Right 1/17/14    U/S guided core bx of the right breast    CORONARY ANGIOPLASTY WITH STENT PLACEMENT      DIAGNOSTIC CARDIAC CATH LAB PROCEDURE      HYSTERECTOMY      PACEMAKER INSERTION         History reviewed. No pertinent family history. Social History     Socioeconomic History    Marital status:       Spouse name: None    Number of children: None    Years of education: None    Highest education level: None   Occupational History    None   Social Needs    Financial resource strain: None    Food insecurity     Worry: None     Inability: None    Transportation needs     Medical: None     Non-medical: None   Tobacco Use    Smoking status: Never Smoker    Smokeless tobacco: Never Used   Substance and Sexual Activity    Alcohol use: No    Drug use: No    Sexual activity: Never   Lifestyle    Physical activity     Days per week: None     Minutes per session: None  Stress: None   Relationships    Social connections     Talks on phone: None     Gets together: None     Attends Protestant service: None     Active member of club or organization: None     Attends meetings of clubs or organizations: None     Relationship status: None    Intimate partner violence     Fear of current or ex partner: None     Emotionally abused: None     Physically abused: None     Forced sexual activity: None   Other Topics Concern    None   Social History Narrative    None       Allergies   Allergen Reactions    Other      Nectarines    Sulfa Antibiotics Rash       Current Outpatient Medications   Medication Sig Dispense Refill    amLODIPine (NORVASC) 10 MG tablet take 1 tablet by mouth once daily 90 tablet 3    carvedilol (COREG) 25 MG tablet Take 25 mg by mouth 2 times daily (with meals)      Cholecalciferol (VITAMIN D) 50 MCG (2000 UT) CAPS capsule Take by mouth      KLOR-CON/EF 25 MEQ disintegrating tablet take 1 packet by mouth once daily 90 tablet 3    pravastatin (PRAVACHOL) 40 MG tablet Take 1 tablet by mouth nightly 30 tablet 6    aspirin EC 81 MG EC tablet Take 1 tablet by mouth daily 30 tablet 3    clopidogrel (PLAVIX) 75 MG tablet Take 1 tablet by mouth daily 30 tablet 11    levothyroxine (SYNTHROID) 50 MCG tablet Take 1 tablet by mouth Daily 30 tablet 11    chlorthalidone (HYGROTON) 25 MG tablet Take 25 mg by mouth daily       NITROSTAT 0.4 MG SL tablet place 1 tablet under the tongue if needed every 5 minutes for chest pain for 3 doses IF NO RELIEF AFTER 3RD DOSE CALL PRESCRIBER . 25 tablet 11    lisinopril (PRINIVIL;ZESTRIL) 20 MG tablet Take 20 mg by mouth daily.  sertraline (ZOLOFT) 50 MG tablet Take 100 mg by mouth daily       LORazepam (ATIVAN) 1 MG tablet Take 1 mg by mouth every 6 hours as needed. No current facility-administered medications for this visit. Review of Systems:   Review of Systems   Constitutional: Positive for fatigue. Negative for diaphoresis. HENT: Negative. Eyes: Negative. Respiratory: Negative. Negative for cough, chest tightness, shortness of breath, wheezing and stridor. Cardiovascular: Positive for palpitations. Negative for chest pain and leg swelling. Gastrointestinal: Positive for constipation. Negative for blood in stool and nausea. Genitourinary: Negative. Musculoskeletal: Positive for arthralgias. Skin: Negative. Neurological: Positive for weakness. Negative for dizziness, syncope and light-headedness. Hematological: Negative. Psychiatric/Behavioral: Negative. Physical Examination:    BP (!) 113/59 (Site: Right Upper Arm, Position: Sitting, Cuff Size: Medium Adult)   Pulse 60   Resp 18   Ht 5' 4\" (1.626 m)   Wt 139 lb (63 kg)   SpO2 98%   BMI 23.86 kg/m²    Physical Exam   Constitutional: She appears healthy. No distress. HENT:   Normal cephalic and Atraumatic   Eyes: Pupils are equal, round, and reactive to light. Neck: Normal range of motion and thyroid normal. Neck supple. No JVD present. No neck adenopathy. No thyromegaly present. Cardiovascular: Normal rate, regular rhythm, intact distal pulses and normal pulses. Murmur heard. Pulmonary/Chest: Effort normal and breath sounds normal. She has no wheezes. She has no rales. She exhibits no tenderness. Abdominal: Soft. Bowel sounds are normal. There is no abdominal tenderness. Musculoskeletal: Normal range of motion. General: Edema (trace) present. No tenderness. Neurological: She is alert and oriented to person, place, and time. Skin: Skin is warm. No cyanosis. Nails show no clubbing.        LABS:  CBC:   Lab Results   Component Value Date    WBC 6.1 10/22/2020    RBC 3.84 10/22/2020    HGB 11.8 10/22/2020    HCT 35.9 10/22/2020    MCV 93.4 10/22/2020    MCH 30.7 10/22/2020    MCHC 32.9 10/22/2020    RDW 15.8 10/22/2020     10/22/2020    MPV 9.1 07/18/2014     Lipids:  Lab Results   Component Value Date    CHOL 190 06/20/2020    CHOL 220 (H) 06/11/2020    CHOL 178 06/15/2019     Lab Results   Component Value Date    TRIG 62 06/20/2020    TRIG 68 06/11/2020    TRIG 52 06/15/2019     Lab Results   Component Value Date    HDL 82 (H) 06/20/2020    HDL 77 (H) 06/11/2020    HDL 76 (H) 06/15/2019     Lab Results   Component Value Date    LDLCALC 96 06/20/2020    LDLCALC 129 06/11/2020    LDLCALC 92 06/15/2019     No results found for: LABVLDL, VLDL  Lab Results   Component Value Date    CHOLHDLRATIO 3.2 11/24/2012     CMP:    Lab Results   Component Value Date     10/29/2020    K 4.0 10/29/2020    K 4.4 10/11/2018     10/29/2020    CO2 28 10/29/2020    BUN 29 10/29/2020    CREATININE 1.38 10/29/2020    GFRAA 43.6 10/29/2020    LABGLOM 36.1 10/29/2020    GLUCOSE 101 10/29/2020    PROT 7.9 10/22/2020    LABALBU 4.2 10/22/2020    CALCIUM 9.6 10/29/2020    BILITOT 0.4 10/22/2020    ALKPHOS 62 10/22/2020    AST 23 10/22/2020    ALT 12 10/22/2020     BMP:    Lab Results   Component Value Date     10/29/2020    K 4.0 10/29/2020    K 4.4 10/11/2018     10/29/2020    CO2 28 10/29/2020    BUN 29 10/29/2020    LABALBU 4.2 10/22/2020    CREATININE 1.38 10/29/2020    CALCIUM 9.6 10/29/2020    GFRAA 43.6 10/29/2020    LABGLOM 36.1 10/29/2020    GLUCOSE 101 10/29/2020     Magnesium:    Lab Results   Component Value Date    MG 2.3 10/22/2020     TSH:  Lab Results   Component Value Date    TSH 2.450 06/20/2020       Patient Active Problem List   Diagnosis    Depression    Anxiety    Hypothyroidism    Heart disease    Osteoarthritis of shoulder    Syncope and collapse    Status post placement of other cardiac pacemaker    Sick sinus syndrome ([address]    History of coronary artery stent placement    Essential hypertension    GRAHAM (dyspnea on exertion)    Pacemaker    History of PTCA 1    Coronary artery disease involving native coronary artery of native heart with angina pectoris ([facility]    Dyslipidemia       There are no discontinued medications. Modified Medications    No medications on file       No orders of the defined types were placed in this encounter. Assessment/Plan:    1. Essential hypertension  Stable     2. History of coronary artery stent placement  No angina     3. Sick sinus syndrome (HCC) - PPM     4. Coronary artery disease involving native coronary artery of native heart with angina pectoris ([facility]   no angina     5. Dyslipidemia   statin     6. Pacemaker   interrgation reviewed. - stable > 9yr battery life - reviewed stable     7. CKD- f/u Dr. Amador Chiu   Counseling:  Heart Healthy Lifestyle, Low Salt Diet, Take Precautions to Prevent Falls, Regular Exercise and Walk Daily    Return in about 3 months (around 2/13/2021).       Electronically signed by Jannie Heath MD on 11/13/2020 at 4:35 PM

## 2020-12-21 RX ORDER — POTASSIUM BICARBONATE 978 MG/1
TABLET, EFFERVESCENT ORAL
Qty: 90 TABLET | Refills: 3 | Status: SHIPPED | OUTPATIENT
Start: 2020-12-21

## 2021-02-01 ENCOUNTER — TELEPHONE (OUTPATIENT)
Dept: CARDIOLOGY CLINIC | Age: 86
End: 2021-02-01

## 2021-02-01 NOTE — TELEPHONE ENCOUNTER
Patient is asking if Dr. Catherine Perez recommends sue she get the covid vaccine due to her heart conditions. Please call pt.  To inform

## 2021-02-08 ENCOUNTER — HOSPITAL ENCOUNTER (OUTPATIENT)
Dept: CARDIOLOGY | Age: 86
Discharge: HOME | End: 2021-02-08
Payer: MEDICARE

## 2021-02-08 PROCEDURE — 93296 REM INTERROG EVL PM/IDS: CPT

## 2021-03-08 ENCOUNTER — OFFICE VISIT (OUTPATIENT)
Dept: CARDIOLOGY CLINIC | Age: 86
End: 2021-03-08
Payer: MEDICARE

## 2021-03-08 VITALS
OXYGEN SATURATION: 97 % | HEIGHT: 64 IN | WEIGHT: 136 LBS | DIASTOLIC BLOOD PRESSURE: 64 MMHG | RESPIRATION RATE: 16 BRPM | SYSTOLIC BLOOD PRESSURE: 112 MMHG | BODY MASS INDEX: 23.22 KG/M2 | HEART RATE: 63 BPM

## 2021-03-08 DIAGNOSIS — Z95.0 PACEMAKER: ICD-10-CM

## 2021-03-08 DIAGNOSIS — I10 ESSENTIAL HYPERTENSION: ICD-10-CM

## 2021-03-08 DIAGNOSIS — I25.119 CORONARY ARTERY DISEASE INVOLVING NATIVE CORONARY ARTERY OF NATIVE HEART WITH ANGINA PECTORIS (HCC): Primary | ICD-10-CM

## 2021-03-08 DIAGNOSIS — I49.5 SICK SINUS SYNDROME (HCC): ICD-10-CM

## 2021-03-08 PROCEDURE — 93000 ELECTROCARDIOGRAM COMPLETE: CPT | Performed by: INTERNAL MEDICINE

## 2021-03-08 PROCEDURE — 99214 OFFICE O/P EST MOD 30 MIN: CPT | Performed by: INTERNAL MEDICINE

## 2021-03-08 ASSESSMENT — ENCOUNTER SYMPTOMS
BLOOD IN STOOL: 0
WHEEZING: 0
EYES NEGATIVE: 1
CONSTIPATION: 1
COUGH: 0
CHEST TIGHTNESS: 0
SHORTNESS OF BREATH: 0
RESPIRATORY NEGATIVE: 1
NAUSEA: 0
STRIDOR: 0

## 2021-03-08 NOTE — PROGRESS NOTES
Subsequent Progress Note  Patient: Hernique Narayanan  YOB: 1932  MRN: [id number]    Chief Complaint: cad palp htn   Chief Complaint   Patient presents with    Follow-up     4 month    Coronary Artery Disease    Hypertension       CV Data:  CAD PCI 2008 8/2017 echo 60-65  10/10/2018 CATH: Prior LAD stent mild to mod ISR, CX- mid 70-80 RCA diffuse-   CX CARLOS placed.  PAULINE dissected and also required stent. 10/018 CUS mild  PPM  3/2019 PVR negative     Subjective/HPI: constipated and taking laxatives. No cp no jaramillo occ dizzy no recent falls no bleed    1/10/2020 feels well no co no sob eats well no falls no bleed. 8/19/2020 no cp no sob no falls no bleed takes meds eats well walks     11/13/2020 no cp no sob no falls no bleed. No plap eats well. 3/8/21 no cp no sob no falls no bleed takes meds eats well tries to be active    EKG: SR LBBB    Past Medical History:   Diagnosis Date    Anxiety     CAD (coronary artery disease)     Depression     Heart disease     Hyperlipidemia     Hypertension     Hypothyroidism        Past Surgical History:   Procedure Laterality Date    BREAST CYST ASPIRATION Right 1/17/14    U/S guided core bx of the right breast    CORONARY ANGIOPLASTY WITH STENT PLACEMENT      DIAGNOSTIC CARDIAC CATH LAB PROCEDURE      HYSTERECTOMY      PACEMAKER INSERTION         History reviewed. No pertinent family history. Social History     Socioeconomic History    Marital status:       Spouse name: None    Number of children: None    Years of education: None    Highest education level: None   Occupational History    None   Social Needs    Financial resource strain: None    Food insecurity     Worry: None     Inability: None    Transportation needs     Medical: None     Non-medical: None   Tobacco Use    Smoking status: Never Smoker    Smokeless tobacco: Never Used   Substance and Sexual Activity    Alcohol use: No    Drug use: No    Sexual activity: Never Lifestyle    Physical activity     Days per week: None     Minutes per session: None    Stress: None   Relationships    Social connections     Talks on phone: None     Gets together: None     Attends Ten[employer] service: None     Active member of club or organization: None     Attends meetings of clubs or organizations: None     Relationship status: None    Intimate partner violence     Fear of current or ex partner: None     Emotionally abused: None     Physically abused: None     Forced sexual activity: None   Other Topics Concern    None   Social History Narrative    None       Allergies   Allergen Reactions    Other      Nectarines    Sulfa Antibiotics Rash       Current Outpatient Medications   Medication Sig Dispense Refill    KLOR-CON/EF 25 MEQ disintegrating tablet take 1 packet by mouth once daily 90 tablet 3    carvedilol (COREG) 25 MG tablet Take 25 mg by mouth 2 times daily (with meals)      Cholecalciferol (VITAMIN D) 50 MCG (2000 UT) CAPS capsule Take by mouth      pravastatin (PRAVACHOL) 40 MG tablet Take 1 tablet by mouth nightly 30 tablet 6    clopidogrel (PLAVIX) 75 MG tablet Take 1 tablet by mouth daily 30 tablet 11    levothyroxine (SYNTHROID) 50 MCG tablet Take 1 tablet by mouth Daily 30 tablet 11    chlorthalidone (HYGROTON) 25 MG tablet Take 25 mg by mouth daily       NITROSTAT 0.4 MG SL tablet place 1 tablet under the tongue if needed every 5 minutes for chest pain for 3 doses IF NO RELIEF AFTER 3RD DOSE CALL PRESCRIBER . 25 tablet 11    lisinopril (PRINIVIL;ZESTRIL) 20 MG tablet Take 20 mg by mouth daily.  sertraline (ZOLOFT) 50 MG tablet Take 100 mg by mouth daily       LORazepam (ATIVAN) 1 MG tablet Take 1 mg by mouth every 6 hours as needed. No current facility-administered medications for this visit. Review of Systems:   Review of Systems   Constitutional: Positive for fatigue. Negative for diaphoresis. HENT: Negative. Eyes: Negative. Lab Results   Component Value Date    TRIG 62 06/20/2020    TRIG 68 06/11/2020    TRIG 52 06/15/2019     Lab Results   Component Value Date    HDL 82 (H) 06/20/2020    HDL 77 (H) 06/11/2020    HDL 76 (H) 06/15/2019     Lab Results   Component Value Date    LDLCALC 96 06/20/2020    LDLCALC 129 06/11/2020    LDLCALC 92 06/15/2019     No results found for: LABVLDL, VLDL  Lab Results   Component Value Date    CHOLHDLRATIO 3.2 11/24/2012     CMP:    Lab Results   Component Value Date     02/12/2021    K 4.5 02/12/2021    K 4.4 10/11/2018    CL 96 02/12/2021    CO2 28 02/12/2021    BUN 37 02/12/2021    CREATININE 1.76 02/12/2021    GFRAA 32.9 02/12/2021    LABGLOM 27.2 02/12/2021    GLUCOSE 99 02/12/2021    PROT 8.0 02/12/2021    LABALBU 4.4 02/12/2021    CALCIUM 9.7 02/12/2021    BILITOT 0.5 02/12/2021    ALKPHOS 67 02/12/2021    AST 24 02/12/2021    ALT 13 02/12/2021     BMP:    Lab Results   Component Value Date     02/12/2021    K 4.5 02/12/2021    K 4.4 10/11/2018    CL 96 02/12/2021    CO2 28 02/12/2021    BUN 37 02/12/2021    LABALBU 4.4 02/12/2021    CREATININE 1.76 02/12/2021    CALCIUM 9.7 02/12/2021    GFRAA 32.9 02/12/2021    LABGLOM 27.2 02/12/2021    GLUCOSE 99 02/12/2021     Magnesium:    Lab Results   Component Value Date    MG 2.3 10/22/2020     TSH:  Lab Results   Component Value Date    TSH 2.450 06/20/2020       Patient Active Problem List   Diagnosis    Depression    Anxiety    Hypothyroidism    Heart disease    Osteoarthritis of shoulder    Syncope and collapse    Status post placement of other cardiac pacemaker    Sick sinus syndrome ([address]    History of coronary artery stent placement    Essential hypertension    GRAHAM (dyspnea on exertion)    Pacemaker    History of PTCA 1    Coronary artery disease involving native coronary artery of native heart with angina pectoris (HCC)    Dyslipidemia       Medications Discontinued During This Encounter   Medication Reason   

## 2021-05-14 ENCOUNTER — HOSPITAL ENCOUNTER (OUTPATIENT)
Dept: CARDIOLOGY | Age: 86
Discharge: HOME | End: 2021-05-14
Payer: MEDICARE

## 2021-05-14 PROCEDURE — 93296 REM INTERROG EVL PM/IDS: CPT

## 2021-08-17 ENCOUNTER — OFFICE VISIT (OUTPATIENT)
Dept: CARDIOLOGY CLINIC | Age: 86
End: 2021-08-17
Payer: MEDICARE

## 2021-08-17 VITALS
HEIGHT: 64 IN | SYSTOLIC BLOOD PRESSURE: 136 MMHG | DIASTOLIC BLOOD PRESSURE: 73 MMHG | BODY MASS INDEX: 21.17 KG/M2 | WEIGHT: 124 LBS | OXYGEN SATURATION: 99 % | RESPIRATION RATE: 16 BRPM | HEART RATE: 62 BPM

## 2021-08-17 DIAGNOSIS — Z95.0 PACEMAKER: ICD-10-CM

## 2021-08-17 DIAGNOSIS — R55 SYNCOPE AND COLLAPSE: ICD-10-CM

## 2021-08-17 DIAGNOSIS — I25.119 CORONARY ARTERY DISEASE INVOLVING NATIVE CORONARY ARTERY OF NATIVE HEART WITH ANGINA PECTORIS (HCC): Primary | ICD-10-CM

## 2021-08-17 DIAGNOSIS — I10 ESSENTIAL HYPERTENSION: ICD-10-CM

## 2021-08-17 DIAGNOSIS — I49.5 SICK SINUS SYNDROME (HCC): ICD-10-CM

## 2021-08-17 DIAGNOSIS — I20.8 ANGINA OF EFFORT (HCC): ICD-10-CM

## 2021-08-17 PROCEDURE — 99214 OFFICE O/P EST MOD 30 MIN: CPT | Performed by: INTERNAL MEDICINE

## 2021-08-17 RX ORDER — LISINOPRIL 20 MG/1
20 TABLET ORAL DAILY
COMMUNITY

## 2021-08-17 RX ORDER — AMLODIPINE BESYLATE 2.5 MG/1
2.5 TABLET ORAL DAILY
COMMUNITY

## 2021-08-17 RX ORDER — VENLAFAXINE 25 MG/1
25 TABLET ORAL 3 TIMES DAILY
COMMUNITY

## 2021-08-17 RX ORDER — ACETAMINOPHEN 500 MG
500 TABLET ORAL EVERY 6 HOURS PRN
COMMUNITY

## 2021-08-17 RX ORDER — RANOLAZINE 500 MG/1
500 TABLET, EXTENDED RELEASE ORAL 2 TIMES DAILY
Qty: 60 TABLET | Refills: 3 | Status: SHIPPED | OUTPATIENT
Start: 2021-08-17 | End: 2021-09-23 | Stop reason: SDUPTHER

## 2021-08-17 ASSESSMENT — ENCOUNTER SYMPTOMS
WHEEZING: 0
EYES NEGATIVE: 1
BLOOD IN STOOL: 0
CHEST TIGHTNESS: 0
CONSTIPATION: 1
RESPIRATORY NEGATIVE: 1
SHORTNESS OF BREATH: 0
NAUSEA: 0
STRIDOR: 0
COUGH: 0

## 2021-08-17 NOTE — PROGRESS NOTES
Subsequent Progress Note  Patient: Brian Thurman  YOB: 1932  MRN: [id number]    Chief Complaint: cad palp htn   Chief Complaint   Patient presents with    Follow-up     4 month    Coronary Artery Disease    Hypertension    Irregular Heart Beat     SSS       CV Data:  CAD PCI 2008 8/2017 echo 60-65  10/10/2018 CATH: Prior LAD stent mild to mod ISR, CX- mid 70-80 RCA diffuse-   CX CARLOS placed.  PAULINE dissected and also required stent. 10/018 CUS mild  PPM  3/2019 PVR negative     Subjective/HPI: constipated and taking laxatives. No cp no jaramillo occ dizzy no recent falls no bleed    1/10/2020 feels well no co no sob eats well no falls no bleed. 8/19/2020 no cp no sob no falls no bleed takes meds eats well walks     11/13/2020 no cp no sob no falls no bleed. No plap eats well. 3/8/21 no cp no sob no falls no bleed takes meds eats well tries to be active    8/17/21 NOW HAS LEFT SIDE SHOULDER AND ARM PAIN. Took NTG SL and seemed to have helped. Causes sob. [place] yesterday for the first time and lost consciousness. No bleed takes meds. EKG: SR LBBB    Past Medical History:   Diagnosis Date    Anxiety     CAD (coronary artery disease)     Depression     Heart disease     Hyperlipidemia     Hypertension     Hypothyroidism        Past Surgical History:   Procedure Laterality Date    BREAST CYST ASPIRATION Right 1/17/14    U/S guided core bx of the right breast    CORONARY ANGIOPLASTY WITH STENT PLACEMENT      DIAGNOSTIC CARDIAC CATH LAB PROCEDURE      HYSTERECTOMY      PACEMAKER INSERTION         History reviewed. No pertinent family history. Social History     Socioeconomic History    Marital status:       Spouse name: None    Number of children: None    Years of education: None    Highest education level: None   Occupational History    None   Tobacco Use    Smoking status: Never Smoker    Smokeless tobacco: Never Used   Substance and Sexual Activity    Alcohol use: No    Drug use: No    Sexual activity: Never   Other Topics Concern    None   Social History Narrative    None     Social Determinants of Health     Financial Resource Strain:     Difficulty of Paying Living Expenses:    Food Insecurity:     Worried About Running Out of Food in the Last Year:     [address] in the Last Year:    Transportation Needs:     Lack of Transportation (Medical):  Lack of Transportation (Non-Medical):    Physical Activity:     Days of Exercise per Week:     Minutes of Exercise per Session:    Stress:     Feeling of Stress :    Social Connections:     Frequency of Communication with Friends and Family:     Frequency of Social Gatherings with Friends and Family:     Attends Confucianist Services:     Active Member of Clubs or Organizations:     Attends Club or Organization Meetings:     Marital Status:    Intimate Partner Violence:     Fear of Current or Ex-Partner:     Emotionally Abused:     Physically Abused:     Sexually Abused:         Allergies   Allergen Reactions    Other      Nectarines    Sulfa Antibiotics Rash       Current Outpatient Medications   Medication Sig Dispense Refill    amLODIPine (NORVASC) 2.5 MG tablet Take 2.5 mg by mouth daily      venlafaxine (EFFEXOR) 25 MG tablet Take 25 mg by mouth 3 times daily      acetaminophen (TYLENOL) 500 MG tablet Take 500 mg by mouth every 6 hours as needed for Pain      Multiple Vitamins-Minerals (OCUVITE EXTRA PO) Take by mouth      lisinopril (PRINIVIL;ZESTRIL) 20 MG tablet Take 20 mg by mouth daily      ranolazine (RANEXA) 500 MG extended release tablet Take 1 tablet by mouth 2 times daily 60 tablet 3    KLOR-CON/EF 25 MEQ disintegrating tablet take 1 packet by mouth once daily 90 tablet 3    Cholecalciferol (VITAMIN D) 50 MCG (2000 UT) CAPS capsule Take by mouth      pravastatin (PRAVACHOL) 40 MG tablet Take 1 tablet by mouth nightly 30 tablet 6    clopidogrel (PLAVIX) 75 MG tablet Take 1 tablet by mouth daily 30 tablet 11    levothyroxine (SYNTHROID) 50 MCG tablet Take 1 tablet by mouth Daily 30 tablet 11    chlorthalidone (HYGROTON) 25 MG tablet Take 25 mg by mouth daily       NITROSTAT 0.4 MG SL tablet place 1 tablet under the tongue if needed every 5 minutes for chest pain for 3 doses IF NO RELIEF AFTER 3RD DOSE CALL PRESCRIBER . 25 tablet 11    LORazepam (ATIVAN) 1 MG tablet Take 1 mg by mouth every 6 hours as needed. No current facility-administered medications for this visit. Review of Systems:   Review of Systems   Constitutional: Positive for fatigue. Negative for diaphoresis. HENT: Negative. Eyes: Negative. Respiratory: Negative. Negative for cough, chest tightness, shortness of breath, wheezing and stridor. Cardiovascular: Positive for palpitations. Negative for chest pain and leg swelling. Gastrointestinal: Positive for constipation. Negative for blood in stool and nausea. Genitourinary: Negative. Musculoskeletal: Positive for arthralgias. Skin: Negative. Neurological: Positive for weakness. Negative for dizziness, syncope and light-headedness. Hematological: Negative. Psychiatric/Behavioral: Negative. Physical Examination:    /73 (Site: Left Upper Arm, Position: Sitting, Cuff Size: Medium Adult)   Pulse 62   Resp 16   Ht 5' 4\" (1.626 m)   Wt 124 lb (56.2 kg)   SpO2 99%   BMI 21.28 kg/m²    Physical Exam   Constitutional: She appears healthy. No distress. HENT:   Normal cephalic and Atraumatic   Eyes: Pupils are equal, round, and reactive to light. Neck: Thyroid normal. No JVD present. No neck adenopathy. No thyromegaly present. Cardiovascular: Normal rate, regular rhythm, intact distal pulses and normal pulses. Murmur heard. Pulmonary/Chest: Effort normal and breath sounds normal. She has no wheezes. She has no rales. She exhibits no tenderness. Abdominal: Soft. Bowel sounds are normal. There is no abdominal tenderness. Musculoskeletal:         General: No tenderness. Normal range of motion. Cervical back: Normal range of motion and neck supple. Neurological: She is alert and oriented to person, place, and time. Skin: Skin is warm. No cyanosis. Nails show no clubbing.        LABS:  CBC:   Lab Results   Component Value Date    WBC 5.9 02/12/2021    RBC 3.94 02/12/2021    HGB 12.2 02/12/2021    HCT 36.6 02/12/2021    MCV 92.9 02/12/2021    MCH 31.0 02/12/2021    MCHC 33.4 02/12/2021    RDW 15.9 02/12/2021     02/12/2021    MPV 9.1 07/18/2014     Lipids:  Lab Results   Component Value Date    CHOL 190 06/20/2020    CHOL 220 (H) 06/11/2020    CHOL 178 06/15/2019     Lab Results   Component Value Date    TRIG 62 06/20/2020    TRIG 68 06/11/2020    TRIG 52 06/15/2019     Lab Results   Component Value Date    HDL 82 (H) 06/20/2020    HDL 77 (H) 06/11/2020    HDL 76 (H) 06/15/2019     Lab Results   Component Value Date    LDLCALC 96 06/20/2020    LDLCALC 129 06/11/2020    LDLCALC 92 06/15/2019     No results found for: LABVLDL, VLDL  Lab Results   Component Value Date    CHOLHDLRATIO 3.2 11/24/2012     CMP:    Lab Results   Component Value Date     02/12/2021    K 4.5 02/12/2021    K 4.4 10/11/2018    CL 96 02/12/2021    CO2 28 02/12/2021    BUN 37 02/12/2021    CREATININE 1.76 02/12/2021    GFRAA 32.9 02/12/2021    LABGLOM 27.2 02/12/2021    GLUCOSE 99 02/12/2021    PROT 8.0 02/12/2021    LABALBU 4.4 02/12/2021    CALCIUM 9.7 02/12/2021    BILITOT 0.5 02/12/2021    ALKPHOS 67 02/12/2021    AST 24 02/12/2021    ALT 13 02/12/2021     BMP:    Lab Results   Component Value Date     02/12/2021    K 4.5 02/12/2021    K 4.4 10/11/2018    CL 96 02/12/2021    CO2 28 02/12/2021    BUN 37 02/12/2021    LABALBU 4.4 02/12/2021    CREATININE 1.76 02/12/2021    CALCIUM 9.7 02/12/2021    GFRAA 32.9 02/12/2021    LABGLOM 27.2 02/12/2021    GLUCOSE 99 02/12/2021     Magnesium:    Lab Results   Component Value Date    MG 2.3 10/22/2020     TSH:  Lab Results   Component Value Date    TSH 2.450 06/20/2020       Patient Active Problem List   Diagnosis    Depression    Anxiety    Hypothyroidism    Heart disease    Osteoarthritis of shoulder    Syncope and collapse    Status post placement of other cardiac pacemaker    Sick sinus syndrome ([facility] [address]    History of coronary artery stent placement    Essential hypertension    GRAHAM (dyspnea on exertion)    Pacemaker    History of PTCA 1    Coronary artery disease involving native coronary artery of native heart with angina pectoris (HCC)    Dyslipidemia       Medications Discontinued During This Encounter   Medication Reason    lisinopril (PRINIVIL;ZESTRIL) 20 MG tablet DISCONTINUED BY ANOTHER CLINICIAN    carvedilol (COREG) 25 MG tablet DISCONTINUED BY ANOTHER CLINICIAN    sertraline (ZOLOFT) 50 MG tablet DISCONTINUED BY ANOTHER CLINICIAN       Modified Medications    No medications on file       Orders Placed This Encounter   Medications    ranolazine (RANEXA) 500 MG extended release tablet     Sig: Take 1 tablet by mouth 2 times daily     Dispense:  60 tablet     Refill:  3       Assessment/Plan:    1. Essential hypertension  BP is stable. Will continue cutrrent BP meds. 2. History of coronary artery stent placement  CP - will need Spect and Echo - continue meds. Will add Ranexa 500 bid for addtion angina support. Continue Plavix but stop ASA  3. Sick sinus syndrome (HCC) - PPM - interrogation report reviewed. Normal fxn and 8 yrs 6 mo on Battery      4. Coronary artery disease involving native coronary artery of native heart with angina pectoris ([facility] [address]   Possible angina - will order spect. 5. Dyslipidemia   conitnue statin     6. Syncope- CUS. Take precautions to avoid falls. 7. CKD- f/u Dr. Maritza Newell stable. Recent las reviewed.    Counseling:  Heart Healthy Lifestyle, Low Salt Diet, Take Precautions to Prevent Falls, Regular Exercise and Walk Daily    Return for

## 2021-08-18 ENCOUNTER — HOSPITAL ENCOUNTER (OUTPATIENT)
Dept: CARDIOLOGY | Age: 86
Discharge: HOME | End: 2021-08-18
Payer: MEDICARE

## 2021-08-18 PROCEDURE — 93296 REM INTERROG EVL PM/IDS: CPT

## 2021-09-17 ENCOUNTER — HOSPITAL ENCOUNTER (OUTPATIENT)
Dept: NUCLEAR MEDICINE | Age: 86
Discharge: HOME | End: 2021-09-19
Payer: MEDICARE

## 2021-09-17 ENCOUNTER — HOSPITAL ENCOUNTER (OUTPATIENT)
Dept: NON INVASIVE DIAGNOSTICS | Age: 86
Discharge: HOME | End: 2021-09-17
Payer: MEDICARE

## 2021-09-17 ENCOUNTER — HOSPITAL ENCOUNTER (OUTPATIENT)
Dept: ULTRASOUND IMAGING | Age: 86
Discharge: HOME | End: 2021-09-19
Payer: MEDICARE

## 2021-09-17 DIAGNOSIS — I20.8 ANGINA OF EFFORT (HCC): ICD-10-CM

## 2021-09-17 DIAGNOSIS — R55 SYNCOPE AND COLLAPSE: ICD-10-CM

## 2021-09-17 LAB
LV EF: 55 %
LVEF MODALITY: NORMAL

## 2021-09-17 PROCEDURE — 93306 TTE W/DOPPLER COMPLETE: CPT

## 2021-09-17 PROCEDURE — 93017 CV STRESS TEST TRACING ONLY: CPT

## 2021-09-17 PROCEDURE — 78452 HT MUSCLE IMAGE SPECT MULT: CPT

## 2021-09-17 PROCEDURE — 2580000003 HC RX 258: Performed by: INTERNAL MEDICINE

## 2021-09-17 PROCEDURE — 93880 EXTRACRANIAL BILAT STUDY: CPT | Performed by: INTERNAL MEDICINE

## 2021-09-17 PROCEDURE — A9502 TC99M TETROFOSMIN: HCPCS | Performed by: INTERNAL MEDICINE

## 2021-09-17 PROCEDURE — 6360000002 HC RX W HCPCS: Performed by: INTERNAL MEDICINE

## 2021-09-17 PROCEDURE — 3430000000 HC RX DIAGNOSTIC RADIOPHARMACEUTICAL: Performed by: INTERNAL MEDICINE

## 2021-09-17 PROCEDURE — 93880 EXTRACRANIAL BILAT STUDY: CPT

## 2021-09-17 RX ORDER — SODIUM CHLORIDE 0.9 % (FLUSH) 0.9 %
10 SYRINGE (ML) INJECTION PRN
Status: DISCONTINUED | OUTPATIENT
Start: 2021-09-17 | End: 2021-09-20 | Stop reason: HOSPADM

## 2021-09-17 RX ADMIN — Medication 10 ML: at 09:51

## 2021-09-17 RX ADMIN — REGADENOSON 0.4 MG: 0.08 INJECTION, SOLUTION INTRAVENOUS at 11:39

## 2021-09-17 RX ADMIN — Medication 10 ML: at 11:39

## 2021-09-17 RX ADMIN — Medication 10 ML: at 11:40

## 2021-09-17 RX ADMIN — TETROFOSMIN 35.3 MILLICURIE: 1.38 INJECTION, POWDER, LYOPHILIZED, FOR SOLUTION INTRAVENOUS at 11:39

## 2021-09-17 RX ADMIN — TETROFOSMIN 11 MILLICURIE: 1.38 INJECTION, POWDER, LYOPHILIZED, FOR SOLUTION INTRAVENOUS at 09:50

## 2021-09-17 NOTE — PROGRESS NOTES
Reviewed history, allergies, and medications. Pt did not take any cardiac medications this morning. Only took synthroid. Consent confirmed. Lexiscan exam explained. Placed patient on monitor. [address] here to inject [place]. SOB noted during recovery phase. Denied chest pain. No ectopy noted. Patient off monitor and instructed to eat, will have last part of exam in 1 hour.

## 2021-09-22 NOTE — PROCEDURES
[address]                      [facility], [address] [facility]                              CARDIAC STRESS TEST    PATIENT NAME: Fco Lion                       :        1932  MED REC NO:   [id number]                            ROOM:  ACCOUNT NO:   [de-identified]                           ADMIT DATE: 2021  PROVIDER:     Nesha Walter DO    CARDIOVASCULAR DIAGNOSTIC DEPARTMENT    DATE OF STUDY:  2021    Jamel Alert MYOCARDIAL PERFUSION STRESS TEST    ORDERING PROVIDER:  Jesica Treviño MD    PRIMARY CARE PROVIDER:  Sandy Chapin DO    EXAM TYPE:  Stress Myocardial Perfusion Regadenosin 1-day. REASON FOR EXAM:  Angina. PROCEDURE DESCRIPTION:  The patient was injected intravenously at rest  with technetium-99m tetrofosmin followed by resting SPECT myocardial  perfusion imaging and then underwent stress protocol using regadenoson  0.4 mg injected intravenously. At that time, technetium-99m tetrofosmin  stress dose was injected intravenously and SPECT myocardial perfusion  and gated imaging were repeated. Rest dose:  11.0 mCi  Stress dose:  35.3 mCi    FINDINGS:  The stress and rest images exhibit homogeneous uptake of  tracer throughout the left ventricular myocardium. There is no evidence  of stress-induced reversible perfusion abnormalities to suggest  myocardial ischemia. Gated imaging exhibits normal left ventricular  size and normal wall motion and myocardial thickening. EKG analysis did  not demonstrate ST-segment changes nor arrhythmias concerning for  myocardial ischemia. Normal EKG, normal sinus rhythm, nonspecific  intraventricular block, and possible lateral infarct. LVEF:  86%  TID ratio:  0.78    IMPRESSION:  1. No evidence of stress-induced myocardial ischemia. 2.  Normal left ventricular systolic function.         Zander Munoz DO    D: 2021 #9:56:32       T: 2021 10:47:19     [email or web address]  Job#: [id number] Doc#: [id number]    CC:

## 2021-09-22 NOTE — PROCEDURES
[address]                      [address], [address] [facility]                              CARDIAC STRESS TEST    PATIENT NAME: Paula Allen                       :        1932  MED REC NO:   [id number]                            ROOM:  ACCOUNT NO:   [de-identified]                           ADMIT DATE: 2021  PROVIDER:     Ingrid Walter DO    CARDIOVASCULAR DIAGNOSTIC DEPARTMENT    DATE OF STUDY:  2021    ADDENDUM    Already dictated this stress test earlier today, please disregard the  second dictation of the stress test, please cancel that one.         Tucker Hanna DO    D: 2021 #11:03:09       T: 2021 12:20:46     [email or web address]  Job#: [id number]     Doc#: [id number]    CC:

## 2021-09-23 ENCOUNTER — OFFICE VISIT (OUTPATIENT)
Dept: CARDIOLOGY CLINIC | Age: 86
End: 2021-09-23
Payer: MEDICARE

## 2021-09-23 VITALS
HEART RATE: 77 BPM | DIASTOLIC BLOOD PRESSURE: 82 MMHG | WEIGHT: 123 LBS | RESPIRATION RATE: 16 BRPM | HEIGHT: 64 IN | SYSTOLIC BLOOD PRESSURE: 132 MMHG | OXYGEN SATURATION: 99 % | BODY MASS INDEX: 21 KG/M2

## 2021-09-23 DIAGNOSIS — I10 ESSENTIAL HYPERTENSION: ICD-10-CM

## 2021-09-23 DIAGNOSIS — Z95.0 PACEMAKER: ICD-10-CM

## 2021-09-23 DIAGNOSIS — I25.119 CORONARY ARTERY DISEASE INVOLVING NATIVE CORONARY ARTERY OF NATIVE HEART WITH ANGINA PECTORIS (HCC): Primary | ICD-10-CM

## 2021-09-23 DIAGNOSIS — I49.5 SICK SINUS SYNDROME (HCC): ICD-10-CM

## 2021-09-23 PROCEDURE — 99214 OFFICE O/P EST MOD 30 MIN: CPT | Performed by: INTERNAL MEDICINE

## 2021-09-23 RX ORDER — RANOLAZINE 500 MG/1
500 TABLET, EXTENDED RELEASE ORAL 2 TIMES DAILY
Qty: 60 TABLET | Refills: 11 | Status: SHIPPED | OUTPATIENT
Start: 2021-09-23 | End: 2022-08-25

## 2021-09-23 ASSESSMENT — ENCOUNTER SYMPTOMS
BLOOD IN STOOL: 0
SHORTNESS OF BREATH: 0
CONSTIPATION: 1
STRIDOR: 0
WHEEZING: 0
CHEST TIGHTNESS: 0
COUGH: 0
RESPIRATORY NEGATIVE: 1
EYES NEGATIVE: 1
NAUSEA: 0

## 2021-09-23 NOTE — PROGRESS NOTES
Subsequent Progress Note  Patient: Jennifer Chau  YOB: 1932  MRN: [id number]    Chief Complaint: cad palp htn   Chief Complaint   Patient presents with    Results     ECHO, Stress Test    Coronary Artery Disease    Hypertension       CV Data:  CAD PCI 2008 8/2017 echo 60-65  10/10/2018 CATH: Prior LAD stent mild to mod ISR, CX- mid 70-80 RCA diffuse-   CX CARLOS placed.  PAULINE dissected and also required stent. 10/018 CUS mild  PPM  3/2019 PVR negative   9/21 CUD mild/mod  9/21 spect - negative  9/21 Echo EF 55 1+ MR RVSP 60 mmHg      Subjective/HPI: constipated and taking laxatives. No cp no jaramillo occ dizzy no recent falls no bleed    1/10/2020 feels well no co no sob eats well no falls no bleed. 8/19/2020 no cp no sob no falls no bleed takes meds eats well walks     11/13/2020 no cp no sob no falls no bleed. No plap eats well. 3/8/21 no cp no sob no falls no bleed takes meds eats well tries to be active    8/17/21 NOW HAS LEFT SIDE SHOULDER AND ARM PAIN. Took NTG SL and seemed to have helped. Causes sob. [place] yesterday for the first time and lost consciousness. No bleed takes meds. 9/23/21 no further cp still having some shoulder discomfort both side. liekly related to remote fall 4 years ago. EKG: SR LBBB    Past Medical History:   Diagnosis Date    Anxiety     CAD (coronary artery disease)     Depression     Heart disease     Hyperlipidemia     Hypertension     Hypothyroidism        Past Surgical History:   Procedure Laterality Date    BREAST CYST ASPIRATION Right 1/17/14    U/S guided core bx of the right breast    CORONARY ANGIOPLASTY WITH STENT PLACEMENT      DIAGNOSTIC CARDIAC CATH LAB PROCEDURE      HYSTERECTOMY      PACEMAKER INSERTION         History reviewed. No pertinent family history. Social History     Socioeconomic History    Marital status:       Spouse name: None    Number of children: None    Years of education: None    Highest education level: None   Occupational History    None   Tobacco Use    Smoking status: Never Smoker    Smokeless tobacco: Never Used   Substance and Sexual Activity    Alcohol use: No    Drug use: No    Sexual activity: Never   Other Topics Concern    None   Social History Narrative    None     Social Determinants of Health     Financial Resource Strain:     Difficulty of Paying Living Expenses:    Food Insecurity:     Worried About Running Out of Food in the Last Year:     Ran Out of Food in the Last Year:    Transportation Needs:     Lack of Transportation (Medical):  Lack of Transportation (Non-Medical):    Physical Activity:     Days of Exercise per Week:     Minutes of Exercise per Session:    Stress:     Feeling of Stress :    Social Connections:     Frequency of Communication with Friends and Family:     Frequency of Social Gatherings with Friends and Family:     Attends Adventism Services:     Active Member of Clubs or Organizations:     Attends Club or Organization Meetings:     Marital Status:    Intimate Partner Violence:     Fear of Current or Ex-Partner:     Emotionally Abused:     Physically Abused:     Sexually Abused:         Allergies   Allergen Reactions    Other      Nectarines    Sulfa Antibiotics Rash       Current Outpatient Medications   Medication Sig Dispense Refill    ranolazine (RANEXA) 500 MG extended release tablet Take 1 tablet by mouth 2 times daily 60 tablet 11    amLODIPine (NORVASC) 2.5 MG tablet Take 2.5 mg by mouth daily      venlafaxine (EFFEXOR) 25 MG tablet Take 25 mg by mouth 3 times daily      acetaminophen (TYLENOL) 500 MG tablet Take 500 mg by mouth every 6 hours as needed for Pain      Multiple Vitamins-Minerals (OCUVITE EXTRA PO) Take by mouth      lisinopril (PRINIVIL;ZESTRIL) 20 MG tablet Take 20 mg by mouth daily      KLOR-CON/EF 25 MEQ disintegrating tablet take 1 packet by mouth once daily 90 tablet 3    Cholecalciferol (VITAMIN D) 50 MCG (2000 UT) CAPS capsule Take by mouth      pravastatin (PRAVACHOL) 40 MG tablet Take 1 tablet by mouth nightly 30 tablet 6    clopidogrel (PLAVIX) 75 MG tablet Take 1 tablet by mouth daily 30 tablet 11    levothyroxine (SYNTHROID) 50 MCG tablet Take 1 tablet by mouth Daily 30 tablet 11    chlorthalidone (HYGROTON) 25 MG tablet Take 25 mg by mouth daily       NITROSTAT 0.4 MG SL tablet place 1 tablet under the tongue if needed every 5 minutes for chest pain for 3 doses IF NO RELIEF AFTER 3RD DOSE CALL PRESCRIBER . 25 tablet 11    LORazepam (ATIVAN) 1 MG tablet Take 1 mg by mouth every 6 hours as needed. No current facility-administered medications for this visit. Review of Systems:   Review of Systems   Constitutional: Positive for fatigue. Negative for diaphoresis. HENT: Negative. Eyes: Negative. Respiratory: Negative. Negative for cough, chest tightness, shortness of breath, wheezing and stridor. Cardiovascular: Positive for palpitations. Negative for chest pain and leg swelling. Gastrointestinal: Positive for constipation. Negative for blood in stool and nausea. Genitourinary: Negative. Musculoskeletal: Positive for arthralgias. Skin: Negative. Neurological: Positive for weakness. Negative for dizziness, syncope and light-headedness. Hematological: Negative. Psychiatric/Behavioral: Negative. Physical Examination:    /82   Pulse 77   Resp 16   Ht 5' 4\" (1.626 m)   Wt 123 lb (55.8 kg)   SpO2 99%   BMI 21.11 kg/m²    Physical Exam   Constitutional: She appears healthy. No distress. HENT:   Normal cephalic and Atraumatic   Eyes: Pupils are equal, round, and reactive to light. Neck: Thyroid normal. No JVD present. No neck adenopathy. No thyromegaly present. Cardiovascular: Normal rate, regular rhythm, intact distal pulses and normal pulses. Murmur heard. Pulmonary/Chest: Effort normal and breath sounds normal. She has no wheezes.  She has no rales. She exhibits no tenderness. Abdominal: Soft. Bowel sounds are normal. There is no abdominal tenderness. Musculoskeletal:         General: No tenderness. Normal range of motion. Cervical back: Normal range of motion and neck supple. Neurological: She is alert and oriented to person, place, and time. Skin: Skin is warm. No cyanosis. Nails show no clubbing.        LABS:  CBC:   Lab Results   Component Value Date    WBC 6.1 08/17/2021    RBC 4.15 08/17/2021    HGB 12.6 08/17/2021    HCT 38.7 08/17/2021    MCV 93.3 08/17/2021    MCH 30.5 08/17/2021    MCHC 32.7 08/17/2021    RDW 15.2 08/17/2021     08/17/2021    MPV 9.1 07/18/2014     Lipids:  Lab Results   Component Value Date    CHOL 190 06/20/2020    CHOL 220 (H) 06/11/2020    CHOL 178 06/15/2019     Lab Results   Component Value Date    TRIG 62 06/20/2020    TRIG 68 06/11/2020    TRIG 52 06/15/2019     Lab Results   Component Value Date    HDL 82 (H) 06/20/2020    HDL 77 (H) 06/11/2020    HDL 76 (H) 06/15/2019     Lab Results   Component Value Date    LDLCALC 96 06/20/2020    LDLCALC 129 06/11/2020    LDLCALC 92 06/15/2019     No results found for: LABVLDL, VLDL  Lab Results   Component Value Date    CHOLHDLRATIO 3.2 11/24/2012     CMP:    Lab Results   Component Value Date     08/17/2021    K 3.6 08/17/2021    K 4.4 10/11/2018    CL 95 08/17/2021    CO2 21 08/17/2021    BUN 74 08/17/2021    CREATININE 2.02 08/17/2021    GFRAA 28.1 08/17/2021    LABGLOM 23.2 08/17/2021    GLUCOSE 93 08/17/2021    PROT 8.3 08/17/2021    LABALBU 4.2 08/17/2021    CALCIUM 9.5 08/17/2021    BILITOT 0.4 08/17/2021    ALKPHOS 59 08/17/2021    AST 31 08/17/2021    ALT 15 08/17/2021     BMP:    Lab Results   Component Value Date     08/17/2021    K 3.6 08/17/2021    K 4.4 10/11/2018    CL 95 08/17/2021    CO2 21 08/17/2021    BUN 74 08/17/2021    LABALBU 4.2 08/17/2021    CREATININE 2.02 08/17/2021    CALCIUM 9.5 08/17/2021    GFRAA 28.1 08/17/2021 LABGLOM 23.2 08/17/2021    GLUCOSE 93 08/17/2021     Magnesium:    Lab Results   Component Value Date    MG 2.6 08/17/2021     TSH:  Lab Results   Component Value Date    TSH 1.800 08/17/2021       Patient Active Problem List   Diagnosis    Depression    Anxiety    Hypothyroidism    Heart disease    Osteoarthritis of shoulder    Syncope and collapse    Status post placement of other cardiac pacemaker    Sick sinus syndrome (HCC)    History of coronary artery stent placement    Essential hypertension    GRAHAM (dyspnea on exertion)    Pacemaker    History of PTCA 1    Coronary artery disease involving native coronary artery of native heart with angina pectoris (HCC)    Dyslipidemia       Medications Discontinued During This Encounter   Medication Reason    ranolazine (RANEXA) 500 MG extended release tablet REORDER       Modified Medications    Modified Medication Previous Medication    RANOLAZINE (RANEXA) 500 MG EXTENDED RELEASE TABLET ranolazine (RANEXA) 500 MG extended release tablet       Take 1 tablet by mouth 2 times daily    Take 1 tablet by mouth 2 times daily       Orders Placed This Encounter   Medications    ranolazine (RANEXA) 500 MG extended release tablet     Sig: Take 1 tablet by mouth 2 times daily     Dispense:  60 tablet     Refill:  11       Assessment/Plan:    1. Essential hypertension  BP is stable. Will continue cutrrent BP meds. - low salt diet    2. History of coronary artery stent placement  CP - will need Spect and Echo - continue meds. Will add Ranexa 500 bid for addtion angina support. Continue Plavix but stop ASA  3. Sick sinus syndrome (HCC) - PPM - interrogation report reviewed. Normal fxn and 8 yrs 6 mo on Battery      4. Coronary artery disease involving native coronary artery of native heart with angina pectoris ([facility] [place] [address]   spect is negative. Continue all meds. 5. Dyslipidemia   conitnue statin     6. Syncope- CUS. Take precautions to avoid falls. - stable.  Will

## 2021-12-30 ENCOUNTER — HOSPITAL ENCOUNTER (OUTPATIENT)
Dept: CARDIOLOGY | Age: 86
Discharge: HOME | End: 2021-12-30
Payer: MEDICARE

## 2021-12-30 PROCEDURE — 93296 REM INTERROG EVL PM/IDS: CPT

## 2022-01-03 NOTE — TELEPHONE ENCOUNTER
Please approve or deny this refill request. The order is pended. Thank you.     LOV 9/23/2021    Next Visit Date:  Future Appointments   Date Time Provider Kamran Jimenez   1/17/2022  3:45 PM Kandy Caban MD [address] [place] [address]

## 2022-01-05 RX ORDER — NITROGLYCERIN 0.4 MG/1
TABLET SUBLINGUAL
Qty: 25 TABLET | Refills: 11 | Status: SHIPPED | OUTPATIENT
Start: 2022-01-05

## 2022-03-10 ENCOUNTER — OFFICE VISIT (OUTPATIENT)
Dept: CARDIOLOGY CLINIC | Age: 87
End: 2022-03-10
Payer: MEDICARE

## 2022-03-10 VITALS
HEIGHT: 64 IN | HEART RATE: 68 BPM | SYSTOLIC BLOOD PRESSURE: 138 MMHG | DIASTOLIC BLOOD PRESSURE: 74 MMHG | RESPIRATION RATE: 15 BRPM | WEIGHT: 124.4 LBS | OXYGEN SATURATION: 95 % | BODY MASS INDEX: 21.24 KG/M2

## 2022-03-10 DIAGNOSIS — I25.119 CORONARY ARTERY DISEASE INVOLVING NATIVE CORONARY ARTERY OF NATIVE HEART WITH ANGINA PECTORIS (HCC): Primary | ICD-10-CM

## 2022-03-10 DIAGNOSIS — E78.5 DYSLIPIDEMIA: ICD-10-CM

## 2022-03-10 DIAGNOSIS — Z95.0 PACEMAKER: ICD-10-CM

## 2022-03-10 DIAGNOSIS — I10 ESSENTIAL HYPERTENSION: ICD-10-CM

## 2022-03-10 DIAGNOSIS — I49.5 SICK SINUS SYNDROME (HCC): ICD-10-CM

## 2022-03-10 PROCEDURE — 99214 OFFICE O/P EST MOD 30 MIN: CPT | Performed by: INTERNAL MEDICINE

## 2022-03-10 PROCEDURE — 93000 ELECTROCARDIOGRAM COMPLETE: CPT | Performed by: INTERNAL MEDICINE

## 2022-03-10 ASSESSMENT — ENCOUNTER SYMPTOMS
BLOOD IN STOOL: 0
WHEEZING: 0
NAUSEA: 0
STRIDOR: 0
CONSTIPATION: 1
RESPIRATORY NEGATIVE: 1
CHEST TIGHTNESS: 0
EYES NEGATIVE: 1
COUGH: 0
SHORTNESS OF BREATH: 0

## 2022-03-10 NOTE — PROGRESS NOTES
Subsequent Progress Note  Patient: Annemarie Chery  YOB: 1932  MRN: [id number]    Chief Complaint: cad palp htn   Chief Complaint   Patient presents with    6 Month Follow-Up    Coronary Artery Disease       CV Data:  CAD PCI 2008 8/2017 echo 60-65  10/10/2018 CATH: Prior LAD stent mild to mod ISR, CX- mid 70-80 RCA diffuse-   CX CARLOS placed.  PAULINE dissected and also required stent. 10/018 CUS mild  PPM  3/2019 PVR negative   9/21 CUs mild/mod  9/21 spect - negative  9/21 Echo EF 55 1+ MR RVSP 60 mmHg      Subjective/HPI: constipated and taking laxatives. No cp no jaramillo occ dizzy no recent falls no bleed    1/10/2020 feels well no co no sob eats well no falls no bleed. 8/19/2020 no cp no sob no falls no bleed takes meds eats well walks     11/13/2020 no cp no sob no falls no bleed. No plap eats well. 3/8/21 no cp no sob no falls no bleed takes meds eats well tries to be active    8/17/21 NOW HAS LEFT SIDE SHOULDER AND ARM PAIN. Took NTG SL and seemed to have helped. Causes sob. [place] yesterday for the first time and lost consciousness. No bleed takes meds. 9/23/21 no further cp still having some shoulder discomfort both side. liekly related to remote fall 4 years ago. 3/10/22 doing well no cp no sob no falls no bleed. Eating well. Had some adb issues few months ago but resolved. EKG: SR 71 LBBB    Past Medical History:   Diagnosis Date    Anxiety     CAD (coronary artery disease)     Depression     Heart disease     Hyperlipidemia     Hypertension     Hypothyroidism        Past Surgical History:   Procedure Laterality Date    BREAST CYST ASPIRATION Right 1/17/14    U/S guided core bx of the right breast    CORONARY ANGIOPLASTY WITH STENT PLACEMENT      DIAGNOSTIC CARDIAC CATH LAB PROCEDURE      HYSTERECTOMY      PACEMAKER INSERTION         History reviewed. No pertinent family history. Social History     Socioeconomic History    Marital status:       Spouse name: None    Number of children: None    Years of education: None    Highest education level: None   Occupational History    None   Tobacco Use    Smoking status: Never Smoker    Smokeless tobacco: Never Used   Substance and Sexual Activity    Alcohol use: No    Drug use: No    Sexual activity: Never   Other Topics Concern    None   Social History Narrative    None     Social Determinants of Health     Financial Resource Strain:     Difficulty of Paying Living Expenses: Not on file   Food Insecurity:     Worried About Running Out of Food in the Last Year: Not on file    [place] of Food in the Last Year: Not on file   Transportation Needs:     Lack of Transportation (Medical): Not on file    Lack of Transportation (Non-Medical):  Not on file   Physical Activity:     Days of Exercise per Week: Not on file    Minutes of Exercise per Session: Not on file   Stress:     Feeling of Stress : Not on file   Social Connections:     Frequency of Communication with Friends and Family: Not on file    Frequency of Social Gatherings with Friends and Family: Not on file    Attends Voodoo Services: Not on file    Active Member of [address] [place] [address] or Organizations: Not on file    Attends Club or Organization Meetings: Not on file    Marital Status: Not on file   Intimate Partner Violence:     Fear of Current or Ex-Partner: Not on file    Emotionally Abused: Not on file    Physically Abused: Not on file    Sexually Abused: Not on file   Housing Stability:     Unable to Pay for Housing in the Last Year: Not on file    Number of [place] in the Last Year: Not on file    Unstable Housing in the Last Year: Not on file       Allergies   Allergen Reactions    Other      Nectarines    Sulfa Antibiotics Rash       Current Outpatient Medications   Medication Sig Dispense Refill    nitroGLYCERIN (NITROSTAT) 0.4 MG SL tablet place 1 tablet under the tongue if needed every 5 minutes for chest pain for 3 doses IF NO RELIEF AFTER 3RD DOSE CALL PRESCRIBER . 25 tablet 11    ranolazine (RANEXA) 500 MG extended release tablet Take 1 tablet by mouth 2 times daily 60 tablet 11    amLODIPine (NORVASC) 2.5 MG tablet Take 2.5 mg by mouth daily      venlafaxine (EFFEXOR) 25 MG tablet Take 25 mg by mouth 3 times daily      acetaminophen (TYLENOL) 500 MG tablet Take 500 mg by mouth every 6 hours as needed for Pain      Multiple Vitamins-Minerals (OCUVITE EXTRA PO) Take by mouth      lisinopril (PRINIVIL;ZESTRIL) 20 MG tablet Take 20 mg by mouth daily      KLOR-CON/EF 25 MEQ disintegrating tablet take 1 packet by mouth once daily 90 tablet 3    Cholecalciferol (VITAMIN D) 50 MCG (2000 UT) CAPS capsule Take by mouth      pravastatin (PRAVACHOL) 40 MG tablet Take 1 tablet by mouth nightly 30 tablet 6    clopidogrel (PLAVIX) 75 MG tablet Take 1 tablet by mouth daily 30 tablet 11    levothyroxine (SYNTHROID) 50 MCG tablet Take 1 tablet by mouth Daily 30 tablet 11    chlorthalidone (HYGROTON) 25 MG tablet Take 25 mg by mouth daily       LORazepam (ATIVAN) 1 MG tablet Take 1 mg by mouth every 6 hours as needed. No current facility-administered medications for this visit. Review of Systems:   Review of Systems   Constitutional: Positive for fatigue. Negative for diaphoresis. HENT: Negative. Eyes: Negative. Respiratory: Negative. Negative for cough, chest tightness, shortness of breath, wheezing and stridor. Cardiovascular: Positive for palpitations. Negative for chest pain and leg swelling. Gastrointestinal: Positive for constipation. Negative for blood in stool and nausea. Genitourinary: Negative. Musculoskeletal: Positive for arthralgias. Skin: Negative. Neurological: Positive for weakness. Negative for dizziness, syncope and light-headedness. Hematological: Negative. Psychiatric/Behavioral: Negative.           Physical Examination:    /74 (Site: Left Upper Arm, Position: Sitting, Cuff Size: Small Adult)   Pulse 68   Resp 15   Ht 5' 4\" (1.626 m)   Wt 124 lb 6.4 oz (56.4 kg)   SpO2 95%   BMI 21.35 kg/m²    Physical Exam   Constitutional: She appears healthy. No distress. HENT:   Normal cephalic and Atraumatic   Eyes: Pupils are equal, round, and reactive to light. Neck: Thyroid normal. No JVD present. No neck adenopathy. No thyromegaly present. Cardiovascular: Normal rate, regular rhythm, intact distal pulses and normal pulses. Murmur heard. Pulmonary/Chest: Effort normal and breath sounds normal. She has no wheezes. She has no rales. She exhibits no tenderness. Abdominal: Soft. Bowel sounds are normal. There is no abdominal tenderness. Musculoskeletal:         General: No tenderness. Normal range of motion. Cervical back: Normal range of motion and neck supple. Neurological: She is alert and oriented to person, place, and time. Skin: Skin is warm. No cyanosis. Nails show no clubbing.        LABS:  CBC:   Lab Results   Component Value Date    WBC 5.4 12/10/2021    RBC 3.81 12/10/2021    HGB 12.0 12/10/2021    HCT 36.1 12/10/2021    MCV 94.5 12/10/2021    MCH 31.5 12/10/2021    MCHC 33.3 12/10/2021    RDW 15.3 12/10/2021     12/10/2021    MPV 9.1 07/18/2014     Lipids:  Lab Results   Component Value Date    CHOL 190 06/20/2020    CHOL 220 (H) 06/11/2020    CHOL 178 06/15/2019     Lab Results   Component Value Date    TRIG 62 06/20/2020    TRIG 68 06/11/2020    TRIG 52 06/15/2019     Lab Results   Component Value Date    HDL 82 (H) 06/20/2020    HDL 77 (H) 06/11/2020    HDL 76 (H) 06/15/2019     Lab Results   Component Value Date    LDLCALC 96 06/20/2020    LDLCALC 129 06/11/2020    [address] 06/15/2019     No results found for: LABVLDL, VLDL  Lab Results   Component Value Date    CHOLHDLRATIO 3.2 11/24/2012     CMP:    Lab Results   Component Value Date     12/10/2021    K 3.7 12/10/2021    K 4.4 10/11/2018    CL 98 12/10/2021    CO2 29 12/10/2021    BUN 26 12/10/2021    CREATININE 1.38 12/10/2021    GFRAA 43.5 12/10/2021    LABGLOM 36.0 12/10/2021    GLUCOSE 104 12/10/2021    PROT 7.6 12/10/2021    LABALBU 3.9 12/10/2021    CALCIUM 9.5 12/10/2021    BILITOT 0.4 12/10/2021    ALKPHOS 65 12/10/2021    AST 24 12/10/2021    ALT 12 12/10/2021     BMP:    Lab Results   Component Value Date     12/10/2021    K 3.7 12/10/2021    K 4.4 10/11/2018    CL 98 12/10/2021    CO2 29 12/10/2021    BUN 26 12/10/2021    LABALBU 3.9 12/10/2021    CREATININE 1.38 12/10/2021    CALCIUM 9.5 12/10/2021    GFRAA 43.5 12/10/2021    LABGLOM 36.0 12/10/2021    GLUCOSE 104 12/10/2021     Magnesium:    Lab Results   Component Value Date    MG 2.1 12/10/2021     TSH:  Lab Results   Component Value Date    TSH 1.780 12/10/2021       Patient Active Problem List   Diagnosis    Depression    Anxiety    Hypothyroidism    Heart disease    Osteoarthritis of shoulder    Syncope and collapse    Status post placement of other cardiac pacemaker    Sick sinus syndrome ([address]    History of coronary artery stent placement    Essential hypertension    GRAHAM (dyspnea on exertion)    Pacemaker    History of PTCA 1    Coronary artery disease involving native coronary artery of native heart with angina pectoris ([address]    Dyslipidemia       There are no discontinued medications. Modified Medications    No medications on file       No orders of the defined types were placed in this encounter. Assessment/Plan:    1. Essential hypertension  BP is stable. Will continue cutrrent BP meds. - low salt diet    2. History of coronary artery stent placement  CP - will need Spect and Echo - continue meds. Will add Ranexa 500 bid for addtion angina support. Continue Plavix but stop ASA  3. Sick sinus syndrome (HCC) - PPM - interrogation report reviewed. Normal fxn and 8 yrs 6 mo on Battery      4.  Coronary artery disease involving native coronary artery of native heart with angina pectoris ([address] spect is negative. Continue all meds. 5. Dyslipidemia   conitnue statin - low fat diet. 6. Syncope- CUS. Take precautions to avoid falls. - stable. Will continue surveillance. 7. CKD- f/u Dr. Sharyle Hemp stable. Recent las reviewed. Counseling:  Heart Healthy Lifestyle, Low Salt Diet, Take Precautions to Prevent Falls, Regular Exercise and Walk Daily    Return in about 4 months (around 7/10/2022).       Electronically signed by Christin Carrillo MD on 3/10/2022 at 3:46 PM

## 2022-04-01 ENCOUNTER — HOSPITAL ENCOUNTER (OUTPATIENT)
Dept: CARDIOLOGY | Age: 87
Discharge: HOME | End: 2022-04-01
Payer: MEDICARE

## 2022-04-01 PROCEDURE — 93280 PM DEVICE PROGR EVAL DUAL: CPT

## 2022-07-05 ENCOUNTER — HOSPITAL ENCOUNTER (OUTPATIENT)
Dept: CARDIOLOGY | Age: 87
Discharge: HOME | End: 2022-07-05
Payer: MEDICARE

## 2022-07-05 PROCEDURE — 93296 REM INTERROG EVL PM/IDS: CPT

## 2022-08-25 ENCOUNTER — OFFICE VISIT (OUTPATIENT)
Dept: CARDIOLOGY CLINIC | Age: 87
End: 2022-08-25
Payer: MEDICARE

## 2022-08-25 VITALS
HEIGHT: 64 IN | HEART RATE: 76 BPM | SYSTOLIC BLOOD PRESSURE: 144 MMHG | TEMPERATURE: 96.9 F | BODY MASS INDEX: 21 KG/M2 | OXYGEN SATURATION: 99 % | RESPIRATION RATE: 16 BRPM | WEIGHT: 123 LBS | DIASTOLIC BLOOD PRESSURE: 72 MMHG

## 2022-08-25 DIAGNOSIS — I49.5 SICK SINUS SYNDROME (HCC): ICD-10-CM

## 2022-08-25 DIAGNOSIS — R55 SYNCOPE AND COLLAPSE: ICD-10-CM

## 2022-08-25 DIAGNOSIS — N18.31 STAGE 3A CHRONIC KIDNEY DISEASE (HCC): ICD-10-CM

## 2022-08-25 DIAGNOSIS — Z95.0 PACEMAKER: ICD-10-CM

## 2022-08-25 DIAGNOSIS — R09.89 BILATERAL CAROTID BRUITS: ICD-10-CM

## 2022-08-25 DIAGNOSIS — I10 ESSENTIAL HYPERTENSION: ICD-10-CM

## 2022-08-25 DIAGNOSIS — I25.119 CORONARY ARTERY DISEASE INVOLVING NATIVE CORONARY ARTERY OF NATIVE HEART WITH ANGINA PECTORIS (HCC): Primary | ICD-10-CM

## 2022-08-25 DIAGNOSIS — E78.5 DYSLIPIDEMIA: ICD-10-CM

## 2022-08-25 PROBLEM — N18.30 CHRONIC RENAL DISEASE, STAGE III (HCC): Status: ACTIVE | Noted: 2022-08-25

## 2022-08-25 PROCEDURE — 1123F ACP DISCUSS/DSCN MKR DOCD: CPT | Performed by: INTERNAL MEDICINE

## 2022-08-25 PROCEDURE — 99214 OFFICE O/P EST MOD 30 MIN: CPT | Performed by: INTERNAL MEDICINE

## 2022-08-25 ASSESSMENT — ENCOUNTER SYMPTOMS
RESPIRATORY NEGATIVE: 1
CHEST TIGHTNESS: 0
STRIDOR: 0
BLOOD IN STOOL: 0
CONSTIPATION: 1
NAUSEA: 0
SHORTNESS OF BREATH: 0
EYES NEGATIVE: 1
WHEEZING: 0
COUGH: 0

## 2022-08-25 NOTE — PROGRESS NOTES
Subsequent Progress Note  Patient: Nikhil Hoskins  YOB: 1932  MRN: [id number]    Chief Complaint: cad palp htn   Chief Complaint   Patient presents with    Hypertension    Coronary Artery Disease       CV Data:  CAD PCI 2008 8/2017 echo 60-65  10/10/2018 CATH: Prior LAD stent mild to mod ISR, CX- mid 70-80 RCA diffuse-   CX CARLOS placed. PAULINE dissected and also required stent. 10/018 CUS mild  PPM  3/2019 PVR negative   9/21 CUs mild/mod  9/21 spect - negative  9/21 Echo EF 55 1+ MR RVSP 60 mmHg      Subjective/HPI: constipated and taking laxatives. No cp no jaramillo occ dizzy no recent falls no bleed    1/10/2020 feels well no co no sob eats well no falls no bleed. 8/19/2020 no cp no sob no falls no bleed takes meds eats well walks     11/13/2020 no cp no sob no falls no bleed. No plap eats well. 3/8/21 no cp no sob no falls no bleed takes meds eats well tries to be active    8/17/21 NOW HAS LEFT SIDE SHOULDER AND ARM PAIN. Took NTG SL and seemed to have helped. Causes sob. [place] yesterday for the first time and lost consciousness. No bleed takes meds. 9/23/21 no further cp still having some shoulder discomfort both side. liekly related to remote fall 4 years ago. 3/10/22 doing well no cp no sob no falls no bleed. Eating well. Had some adb issues few months ago but resolved. 8/25/22 doing well no cp no  sob no falls no bleed still active    EKG: SR 71 LBBB    Past Medical History:   Diagnosis Date    Anxiety     CAD (coronary artery disease)     Depression     Heart disease     Hyperlipidemia     Hypertension     Hypothyroidism        Past Surgical History:   Procedure Laterality Date    BREAST CYST ASPIRATION Right 1/17/14    U/S guided core bx of the right breast    CORONARY ANGIOPLASTY WITH STENT PLACEMENT      DIAGNOSTIC CARDIAC CATH LAB PROCEDURE      HYSTERECTOMY (CERVIX STATUS UNKNOWN)      PACEMAKER INSERTION         No family history on file.     Social History     Socioeconomic History    Marital status:      Spouse name: None    Number of children: None    Years of education: None    Highest education level: None   Tobacco Use    Smoking status: Never    Smokeless tobacco: Never   Substance and Sexual Activity    Alcohol use: No    Drug use: No    Sexual activity: Never       Allergies   Allergen Reactions    Other      Nectarines    Sulfa Antibiotics Rash       Current Outpatient Medications   Medication Sig Dispense Refill    nitroGLYCERIN (NITROSTAT) 0.4 MG SL tablet place 1 tablet under the tongue if needed every 5 minutes for chest pain for 3 doses IF NO RELIEF AFTER 3RD DOSE CALL PRESCRIBER . 25 tablet 11    ranolazine (RANEXA) 500 MG extended release tablet Take 1 tablet by mouth 2 times daily 60 tablet 11    amLODIPine (NORVASC) 2.5 MG tablet Take 2.5 mg by mouth daily      venlafaxine (EFFEXOR) 25 MG tablet Take 25 mg by mouth 3 times daily      acetaminophen (TYLENOL) 500 MG tablet Take 500 mg by mouth every 6 hours as needed for Pain      Multiple Vitamins-Minerals (OCUVITE EXTRA PO) Take by mouth      lisinopril (PRINIVIL;ZESTRIL) 20 MG tablet Take 20 mg by mouth daily      KLOR-CON/EF 25 MEQ disintegrating tablet take 1 packet by mouth once daily 90 tablet 3    Cholecalciferol (VITAMIN D) 50 MCG (2000 UT) CAPS capsule Take by mouth      pravastatin (PRAVACHOL) 40 MG tablet Take 1 tablet by mouth nightly 30 tablet 6    clopidogrel (PLAVIX) 75 MG tablet Take 1 tablet by mouth daily 30 tablet 11    levothyroxine (SYNTHROID) 50 MCG tablet Take 1 tablet by mouth Daily 30 tablet 11    chlorthalidone (HYGROTON) 25 MG tablet Take 25 mg by mouth daily       LORazepam (ATIVAN) 1 MG tablet Take 1 mg by mouth every 6 hours as needed. No current facility-administered medications for this visit. Review of Systems:   Review of Systems   Constitutional:  Positive for fatigue. Negative for diaphoresis. HENT: Negative. Eyes: Negative. Respiratory: Negative. Negative for cough, chest tightness, shortness of breath, wheezing and stridor. Cardiovascular:  Positive for palpitations. Negative for chest pain and leg swelling. Gastrointestinal:  Positive for constipation. Negative for blood in stool and nausea. Genitourinary: Negative. Musculoskeletal:  Positive for arthralgias. Skin: Negative. Neurological:  Positive for weakness. Negative for dizziness, syncope and light-headedness. Hematological: Negative. Psychiatric/Behavioral: Negative. Physical Examination:    BP (!) 144/72 (Site: Left Upper Arm, Position: Sitting, Cuff Size: Small Adult)   Pulse 76   Temp 96.9 °F (36.1 °C) (Temporal)   Resp 16   Ht 5' 4\" (1.626 m)   Wt 123 lb (55.8 kg)   SpO2 99%   BMI 21.11 kg/m²    Physical Exam   Constitutional: She appears healthy. No distress. HENT:   Normal cephalic and Atraumatic   Eyes: Pupils are equal, round, and reactive to light. Neck: Thyroid normal. No JVD present. No neck adenopathy. No thyromegaly present. Cardiovascular: Normal rate, regular rhythm, intact distal pulses and normal pulses. Murmur heard. Pulmonary/Chest: Effort normal and breath sounds normal. She has no wheezes. She has no rales. She exhibits no tenderness. Abdominal: Soft. Bowel sounds are normal. There is no abdominal tenderness. Musculoskeletal:         General: No tenderness. Normal range of motion. Cervical back: Normal range of motion and neck supple. Neurological: She is alert and oriented to person, place, and time. Skin: Skin is warm. No cyanosis. Nails show no clubbing.      LABS:  CBC:   Lab Results   Component Value Date/Time    WBC 5.2 08/22/2022 09:55 AM    RBC 4.19 08/22/2022 09:55 AM    HGB 12.8 08/22/2022 09:55 AM    HCT 38.5 08/22/2022 09:55 AM    MCV 91.9 08/22/2022 09:55 AM    MCH 30.7 08/22/2022 09:55 AM    MCHC 33.4 08/22/2022 09:55 AM    RDW 16.4 08/22/2022 09:55 AM     08/22/2022 09:55 AM    MPV 9.1 07/18/2014 07:52 PM     Lipids:  Lab Results   Component Value Date    CHOL 190 06/20/2020    CHOL 220 (H) 06/11/2020    CHOL 178 06/15/2019     Lab Results   Component Value Date    TRIG 62 06/20/2020    TRIG 68 06/11/2020    TRIG 52 06/15/2019     Lab Results   Component Value Date    HDL 82 (H) 06/20/2020    HDL 77 (H) 06/11/2020    HDL 76 (H) 06/15/2019     Lab Results   Component Value Date    LDLCALC 96 06/20/2020    LDLCALC 129 06/11/2020    LDLCALC 92 06/15/2019     No results found for: LABVLDL, VLDL  Lab Results   Component Value Date    CHOLHDLRATIO 3.2 11/24/2012     CMP:    Lab Results   Component Value Date/Time     08/22/2022 09:55 AM    K 3.9 08/22/2022 09:55 AM    K 4.4 10/11/2018 05:42 AM    CL 98 08/22/2022 09:55 AM    CO2 25 08/22/2022 09:55 AM    BUN 22 08/22/2022 09:55 AM    CREATININE 1.30 08/22/2022 09:55 AM    GFRAA 46.5 08/22/2022 09:55 AM    LABGLOM 38.5 08/22/2022 09:55 AM    GLUCOSE 90 08/22/2022 09:55 AM    PROT 8.5 08/22/2022 09:55 AM    LABALBU 4.2 08/22/2022 09:55 AM    CALCIUM 9.4 08/22/2022 09:55 AM    BILITOT 0.4 08/22/2022 09:55 AM    ALKPHOS 79 08/22/2022 09:55 AM    AST 26 08/22/2022 09:55 AM    ALT 14 08/22/2022 09:55 AM     BMP:    Lab Results   Component Value Date/Time     08/22/2022 09:55 AM    K 3.9 08/22/2022 09:55 AM    K 4.4 10/11/2018 05:42 AM    CL 98 08/22/2022 09:55 AM    CO2 25 08/22/2022 09:55 AM    BUN 22 08/22/2022 09:55 AM    LABALBU 4.2 08/22/2022 09:55 AM    CREATININE 1.30 08/22/2022 09:55 AM    CALCIUM 9.4 08/22/2022 09:55 AM    GFRAA 46.5 08/22/2022 09:55 AM    LABGLOM 38.5 08/22/2022 09:55 AM    GLUCOSE 90 08/22/2022 09:55 AM     Magnesium:    Lab Results   Component Value Date/Time    MG 2.1 12/10/2021 11:30 AM     TSH:  Lab Results   Component Value Date    TSH 2.380 05/10/2022       Patient Active Problem List   Diagnosis    Depression    Anxiety    Hypothyroidism    Heart disease    Osteoarthritis of shoulder    Syncope and collapse    Status post placement of other cardiac pacemaker    Sick sinus syndrome ([facility]    History of coronary artery stent placement    Essential hypertension    GRAHAM (dyspnea on exertion)    Pacemaker    History of PTCA 1    Coronary artery disease involving native coronary artery of native heart with angina pectoris ([facility]    Dyslipidemia    Chronic renal disease, stage III ([facility] [[id number]]       There are no discontinued medications. Modified Medications    No medications on file       No orders of the defined types were placed in this encounter. Assessment/Plan:    1. Essential hypertension  BP is stable. Will continue cutrrent BP meds. - low salt diet    2. History of coronary artery stent placement  CP - will need Spect and Echo - continue meds. Will add Ranexa 500 bid for addtion angina support. Pt stopped due to dizzy     Continue Plavix but stop ASA  3. Sick sinus syndrome (HCC) - PPM - interrogation report reviewed. Normal fxn and 7 yr 9 mo on Battery      4. Coronary artery disease involving native coronary artery of native heart with angina pectoris ([facility]   spect is negative. Continue all meds. 5. Dyslipidemia   conitnue statin - low fat diet. F/u labs    6. Syncope- CUS. Take precautions to avoid falls. - stable. Will continue surveillance. 7. CKD- f/u Dr. Kirsten Phillips stable. Recent labs reviewed. Counseling:  Heart Healthy Lifestyle, Low Salt Diet, Take Precautions to Prevent Falls, Regular Exercise and Walk Daily    Return in about 4 months (around 12/25/2022).       Electronically signed by Janette Pak MD on 8/25/2022 at 3:49 PM

## 2022-09-26 ENCOUNTER — HOSPITAL ENCOUNTER (OUTPATIENT)
Dept: ULTRASOUND IMAGING | Age: 87
Discharge: HOME | End: 2022-09-28
Payer: MEDICARE

## 2022-09-26 DIAGNOSIS — R09.89 BILATERAL CAROTID BRUITS: ICD-10-CM

## 2022-09-26 PROCEDURE — 93880 EXTRACRANIAL BILAT STUDY: CPT | Performed by: INTERNAL MEDICINE

## 2022-09-26 PROCEDURE — 93880 EXTRACRANIAL BILAT STUDY: CPT

## 2022-10-04 ENCOUNTER — HOSPITAL ENCOUNTER (OUTPATIENT)
Dept: CARDIOLOGY | Age: 87
Discharge: HOME | End: 2022-10-04
Payer: MEDICARE

## 2022-10-04 PROCEDURE — 93280 PM DEVICE PROGR EVAL DUAL: CPT

## 2022-12-16 ENCOUNTER — OFFICE VISIT (OUTPATIENT)
Dept: CARDIOLOGY CLINIC | Age: 87
End: 2022-12-16
Payer: MEDICARE

## 2022-12-16 VITALS
SYSTOLIC BLOOD PRESSURE: 120 MMHG | WEIGHT: 126.8 LBS | BODY MASS INDEX: 21.77 KG/M2 | OXYGEN SATURATION: 96 % | DIASTOLIC BLOOD PRESSURE: 68 MMHG | HEART RATE: 71 BPM

## 2022-12-16 DIAGNOSIS — I10 ESSENTIAL HYPERTENSION: ICD-10-CM

## 2022-12-16 DIAGNOSIS — N18.31 STAGE 3A CHRONIC KIDNEY DISEASE (HCC): ICD-10-CM

## 2022-12-16 DIAGNOSIS — R55 SYNCOPE AND COLLAPSE: ICD-10-CM

## 2022-12-16 DIAGNOSIS — I25.119 CORONARY ARTERY DISEASE INVOLVING NATIVE CORONARY ARTERY OF NATIVE HEART WITH ANGINA PECTORIS (HCC): Primary | ICD-10-CM

## 2022-12-16 DIAGNOSIS — Z95.0 PACEMAKER: ICD-10-CM

## 2022-12-16 DIAGNOSIS — E78.5 DYSLIPIDEMIA: ICD-10-CM

## 2022-12-16 DIAGNOSIS — R09.89 BILATERAL CAROTID BRUITS: ICD-10-CM

## 2022-12-16 DIAGNOSIS — I49.5 SICK SINUS SYNDROME (HCC): ICD-10-CM

## 2022-12-16 PROCEDURE — 1123F ACP DISCUSS/DSCN MKR DOCD: CPT | Performed by: INTERNAL MEDICINE

## 2022-12-16 PROCEDURE — 99214 OFFICE O/P EST MOD 30 MIN: CPT | Performed by: INTERNAL MEDICINE

## 2022-12-16 RX ORDER — AMLODIPINE BESYLATE 5 MG/1
TABLET ORAL
COMMUNITY
Start: 2022-12-05

## 2022-12-16 ASSESSMENT — ENCOUNTER SYMPTOMS
EYES NEGATIVE: 1
CONSTIPATION: 1
STRIDOR: 0
SHORTNESS OF BREATH: 0
CHEST TIGHTNESS: 0
RESPIRATORY NEGATIVE: 1
NAUSEA: 0
WHEEZING: 0
COUGH: 0
BLOOD IN STOOL: 0

## 2022-12-16 NOTE — PROGRESS NOTES
Subsequent Progress Note  Patient: Pamela Salazar  YOB: 1932  MRN: [id number]    Chief Complaint: cad palp htn   Chief Complaint   Patient presents with    Follow-up     4 month    Coronary Artery Disease       CV Data:  CAD PCI 2008 8/2017 echo 60-65  10/10/2018 CATH: Prior LAD stent mild to mod ISR, CX- mid 70-80 RCA diffuse-   CX CARLOS placed. PAULINE dissected and also required stent. 10/018 CUS mild  PPM  3/2019 PVR negative   9/21 CUs mild/mod  9/21 spect - negative  9/21 Echo EF 55 1+ MR RVSP 60 mmHg  9/22 CUS mild mod      Subjective/HPI: constipated and taking laxatives. No cp no jaramillo occ dizzy no recent falls no bleed    1/10/2020 feels well no co no sob eats well no falls no bleed. 8/19/2020 no cp no sob no falls no bleed takes meds eats well walks     11/13/2020 no cp no sob no falls no bleed. No plap eats well. 3/8/21 no cp no sob no falls no bleed takes meds eats well tries to be active    8/17/21 NOW HAS LEFT SIDE SHOULDER AND ARM PAIN. Took NTG SL and seemed to have helped. Causes sob. Bernadette Roche yesterday for the first time and lost consciousness. No bleed takes meds. 9/23/21 no further cp still having some shoulder discomfort both side. liekly related to remote fall 4 years ago. 3/10/22 doing well no cp no sob no falls no bleed. Eating well. Had some adb issues few months ago but resolved. 8/25/22 doing well no cp no  sob no falls no bleed still active    12/16/22 doing well no cp no sob no falls no bleed takes meds.  No falls     EKG: SR 71 LBBB    Past Medical History:   Diagnosis Date    Anxiety     CAD (coronary artery disease)     Depression     Heart disease     Hyperlipidemia     Hypertension     Hypothyroidism        Past Surgical History:   Procedure Laterality Date    BREAST CYST ASPIRATION Right 1/17/14    U/S guided core bx of the right breast    CORONARY ANGIOPLASTY WITH STENT PLACEMENT      DIAGNOSTIC CARDIAC CATH LAB PROCEDURE      HYSTERECTOMY (CERVIX STATUS UNKNOWN)      PACEMAKER INSERTION         No family history on file. Social History     Socioeconomic History    Marital status:      Spouse name: None    Number of children: None    Years of education: None    Highest education level: None   Tobacco Use    Smoking status: Never    Smokeless tobacco: Never   Substance and Sexual Activity    Alcohol use: No    Drug use: No    Sexual activity: Never       Allergies   Allergen Reactions    Other      Nectarines    Sulfa Antibiotics Rash       Current Outpatient Medications   Medication Sig Dispense Refill    amLODIPine (NORVASC) 5 MG tablet take 1 tablet by mouth once daily 90      nitroGLYCERIN (NITROSTAT) 0.4 MG SL tablet place 1 tablet under the tongue if needed every 5 minutes for chest pain for 3 doses IF NO RELIEF AFTER 3RD DOSE CALL PRESCRIBER . 25 tablet 11    amLODIPine (NORVASC) 2.5 MG tablet Take 2.5 mg by mouth daily      venlafaxine (EFFEXOR) 25 MG tablet Take 25 mg by mouth 3 times daily      acetaminophen (TYLENOL) 500 MG tablet Take 500 mg by mouth every 6 hours as needed for Pain      Multiple Vitamins-Minerals (OCUVITE EXTRA PO) Take by mouth      lisinopril (PRINIVIL;ZESTRIL) 20 MG tablet Take 20 mg by mouth daily      KLOR-CON/EF 25 MEQ disintegrating tablet take 1 packet by mouth once daily 90 tablet 3    Cholecalciferol (VITAMIN D) 50 MCG (2000 UT) CAPS capsule Take by mouth      pravastatin (PRAVACHOL) 40 MG tablet Take 1 tablet by mouth nightly 30 tablet 6    clopidogrel (PLAVIX) 75 MG tablet Take 1 tablet by mouth daily 30 tablet 11    levothyroxine (SYNTHROID) 50 MCG tablet Take 1 tablet by mouth Daily 30 tablet 11    chlorthalidone (HYGROTON) 25 MG tablet Take 25 mg by mouth daily       LORazepam (ATIVAN) 1 MG tablet Take 1 mg by mouth every 6 hours as needed. No current facility-administered medications for this visit. Review of Systems:   Review of Systems   Constitutional:  Positive for fatigue.  Negative for diaphoresis. HENT: Negative. Eyes: Negative. Respiratory: Negative. Negative for cough, chest tightness, shortness of breath, wheezing and stridor. Cardiovascular:  Positive for palpitations. Negative for chest pain and leg swelling. Gastrointestinal:  Positive for constipation. Negative for blood in stool and nausea. Genitourinary: Negative. Musculoskeletal:  Positive for arthralgias. Skin: Negative. Neurological:  Positive for weakness. Negative for dizziness, syncope and light-headedness. Hematological: Negative. Psychiatric/Behavioral: Negative. Physical Examination:    /68 (Site: Right Upper Arm, Position: Sitting, Cuff Size: Medium Adult)   Pulse 71   Wt 126 lb 12.8 oz (57.5 kg)   SpO2 96%   BMI 21.77 kg/m²    Physical Exam   Constitutional: She appears healthy. No distress. HENT:   Normal cephalic and Atraumatic   Eyes: Pupils are equal, round, and reactive to light. Neck: Thyroid normal. No JVD present. No neck adenopathy. No thyromegaly present. Cardiovascular: Normal rate, regular rhythm, intact distal pulses and normal pulses. Murmur heard. Pulmonary/Chest: Effort normal and breath sounds normal. She has no wheezes. She has no rales. She exhibits no tenderness. Abdominal: Soft. Bowel sounds are normal. There is no abdominal tenderness. Musculoskeletal:         General: No tenderness. Normal range of motion. Cervical back: Normal range of motion and neck supple. Neurological: She is alert and oriented to person, place, and time. Skin: Skin is warm. No cyanosis. Nails show no clubbing.      LABS:  CBC:   Lab Results   Component Value Date/Time    WBC 5.2 08/22/2022 09:55 AM    RBC 4.19 08/22/2022 09:55 AM    HGB 12.8 08/22/2022 09:55 AM    HCT 38.5 08/22/2022 09:55 AM    MCV 91.9 08/22/2022 09:55 AM    MCH 30.7 08/22/2022 09:55 AM    MCHC 33.4 08/22/2022 09:55 AM    RDW 16.4 08/22/2022 09:55 AM     08/22/2022 09:55 AM    MPV 9.1 07/18/2014 07:52 PM     Lipids:  Lab Results   Component Value Date    CHOL 190 06/20/2020    CHOL 220 (H) 06/11/2020    CHOL 178 06/15/2019     Lab Results   Component Value Date    TRIG 62 06/20/2020    TRIG 68 06/11/2020    TRIG 52 06/15/2019     Lab Results   Component Value Date    HDL 82 (H) 06/20/2020    HDL 77 (H) 06/11/2020    HDL 76 (H) 06/15/2019     Lab Results   Component Value Date    LDLCALC 96 06/20/2020    LDLCALC 129 06/11/2020    LDLCALC 92 06/15/2019     No results found for: LABVLDL, VLDL  Lab Results   Component Value Date    CHOLHDLRATIO 3.2 11/24/2012     CMP:    Lab Results   Component Value Date/Time     08/22/2022 09:55 AM    K 3.9 08/22/2022 09:55 AM    K 4.4 10/11/2018 05:42 AM    CL 98 08/22/2022 09:55 AM    CO2 25 08/22/2022 09:55 AM    BUN 22 08/22/2022 09:55 AM    CREATININE 1.30 08/22/2022 09:55 AM    GFRAA 46.5 08/22/2022 09:55 AM    LABGLOM 38.5 08/22/2022 09:55 AM    GLUCOSE 90 08/22/2022 09:55 AM    PROT 8.5 08/22/2022 09:55 AM    LABALBU 4.2 08/22/2022 09:55 AM    CALCIUM 9.4 08/22/2022 09:55 AM    BILITOT 0.4 08/22/2022 09:55 AM    ALKPHOS 79 08/22/2022 09:55 AM    AST 26 08/22/2022 09:55 AM    ALT 14 08/22/2022 09:55 AM     BMP:    Lab Results   Component Value Date/Time     08/22/2022 09:55 AM    K 3.9 08/22/2022 09:55 AM    K 4.4 10/11/2018 05:42 AM    CL 98 08/22/2022 09:55 AM    CO2 25 08/22/2022 09:55 AM    BUN 22 08/22/2022 09:55 AM    LABALBU 4.2 08/22/2022 09:55 AM    CREATININE 1.30 08/22/2022 09:55 AM    CALCIUM 9.4 08/22/2022 09:55 AM    GFRAA 46.5 08/22/2022 09:55 AM    LABGLOM 38.5 08/22/2022 09:55 AM    GLUCOSE 90 08/22/2022 09:55 AM     Magnesium:    Lab Results   Component Value Date/Time    MG 2.1 12/10/2021 11:30 AM     TSH:  Lab Results   Component Value Date    TSH 2.380 05/10/2022       Patient Active Problem List   Diagnosis    Depression    Anxiety    Hypothyroidism    Heart disease    Osteoarthritis of shoulder    Syncope and collapse Status post placement of other cardiac pacemaker    Sick sinus syndrome ([facility] [address]    History of coronary artery stent placement    Essential hypertension    GRAHAM (dyspnea on exertion)    Pacemaker    History of PTCA 1    Coronary artery disease involving native coronary artery of native heart with angina pectoris ([address]    Dyslipidemia    Chronic renal disease, stage III ([address] [154001]       There are no discontinued medications. Modified Medications    No medications on file       No orders of the defined types were placed in this encounter. Assessment/Plan:    1. Essential hypertension  BP is stable. Will continue cutrrent BP meds. - low salt diet    2. History of coronary artery stent placement  CP - will need Spect and Echo - continue meds. Continue Plavix but stop ASA - no further CP     3. Sick sinus syndrome (HCC) - PPM - interrogation report reviewed. Normal fxn and 7 yr 7 mo on Battery - reviewed with pt.      4. Coronary artery disease involving native coronary artery of native heart with angina pectoris ([facility] [address]   spect is negative. Continue all meds. 5. Dyslipidemia   conitnue statin - low fat diet. F/u labs    6. Syncope- CUS. Take precautions to avoid falls. - stable. Will continue surveillance. 7. CKD- f/u Dr. Kareen lawrence. Recent labs reviewed. Counseling:  Heart Healthy Lifestyle, Low Salt Diet, Take Precautions to Prevent Falls, Regular Exercise and Walk Daily    Return in about 4 months (around 4/16/2023).       Electronically signed by Funmilayo Del Toro MD on 12/16/2022 at 12:07 PM

## 2023-01-03 ENCOUNTER — HOSPITAL ENCOUNTER (OUTPATIENT)
Dept: CARDIOLOGY | Age: 88
Discharge: HOME | End: 2023-01-03
Payer: MEDICARE

## 2023-01-03 PROCEDURE — 93296 REM INTERROG EVL PM/IDS: CPT

## 2023-04-04 ENCOUNTER — HOSPITAL ENCOUNTER (OUTPATIENT)
Dept: CARDIOLOGY | Age: 88
Discharge: HOME | End: 2023-04-04
Payer: MEDICARE

## 2023-04-04 PROCEDURE — 93280 PM DEVICE PROGR EVAL DUAL: CPT

## 2023-04-19 ENCOUNTER — OFFICE VISIT (OUTPATIENT)
Dept: CARDIOLOGY CLINIC | Age: 88
End: 2023-04-19
Payer: MEDICARE

## 2023-04-19 VITALS
WEIGHT: 129.6 LBS | OXYGEN SATURATION: 89 % | DIASTOLIC BLOOD PRESSURE: 62 MMHG | HEART RATE: 66 BPM | SYSTOLIC BLOOD PRESSURE: 130 MMHG | BODY MASS INDEX: 22.13 KG/M2 | HEIGHT: 64 IN

## 2023-04-19 DIAGNOSIS — I25.119 CORONARY ARTERY DISEASE INVOLVING NATIVE CORONARY ARTERY OF NATIVE HEART WITH ANGINA PECTORIS (HCC): Primary | ICD-10-CM

## 2023-04-19 PROCEDURE — 93000 ELECTROCARDIOGRAM COMPLETE: CPT | Performed by: INTERNAL MEDICINE

## 2023-04-19 PROCEDURE — 1123F ACP DISCUSS/DSCN MKR DOCD: CPT | Performed by: INTERNAL MEDICINE

## 2023-04-19 PROCEDURE — 99214 OFFICE O/P EST MOD 30 MIN: CPT | Performed by: INTERNAL MEDICINE

## 2023-04-19 ASSESSMENT — ENCOUNTER SYMPTOMS
WHEEZING: 0
SHORTNESS OF BREATH: 0
STRIDOR: 0
RESPIRATORY NEGATIVE: 1
CONSTIPATION: 1
EYES NEGATIVE: 1
NAUSEA: 0
BLOOD IN STOOL: 0
COUGH: 0
CHEST TIGHTNESS: 0

## 2023-04-19 NOTE — PROGRESS NOTES
of shoulder    Syncope and collapse    Status post placement of other cardiac pacemaker    Sick sinus syndrome (HCC)    History of coronary artery stent placement    Essential hypertension    GRAHAM (dyspnea on exertion)    Pacemaker    History of PTCA 1    Coronary artery disease involving native coronary artery of native heart with angina pectoris ([facility] [address]    Dyslipidemia    Chronic renal disease, stage III ([facility] [address] [[id number]]       There are no discontinued medications. Modified Medications    No medications on file       No orders of the defined types were placed in this encounter. Assessment/Plan:    1. Essential hypertension  BP is stable. Will continue cutrrent BP meds. - low salt diet    2. History of coronary artery stent placement  CP - will need Spect and Echo - continue meds. Continue Plavix but stop ASA - no further CP     3. Sick sinus syndrome (HCC) - PPM - interrogation report reviewed. Normal fxn and 6.5 mo on Battery - reviewed with pt.      4. Coronary artery disease involving native coronary artery of native heart with angina pectoris (HCC)   spect is negative. Continue all meds. 5. Dyslipidemia   conitnue statin - low fat diet. F/u labs    6. Syncope- CUS. Take precautions to avoid falls. - stable. Will continue surveillance. 7. CKD- f/u Dr. Tabitha Sharpe stable. Recent labs reviewed. Counseling:  Heart Healthy Lifestyle, Low Salt Diet, Take Precautions to Prevent Falls, Regular Exercise and Walk Daily    Return in about 4 months (around 8/19/2023).       Electronically signed by Erna Schwab MD on 4/19/2023 at 1:05 PM

## 2023-07-27 ENCOUNTER — HOSPITAL ENCOUNTER (OUTPATIENT)
Dept: CARDIOLOGY | Age: 88
Discharge: HOME | End: 2023-07-27
Payer: MEDICARE

## 2023-07-27 PROCEDURE — 93296 REM INTERROG EVL PM/IDS: CPT

## 2023-08-22 ENCOUNTER — OFFICE VISIT (OUTPATIENT)
Dept: CARDIOLOGY CLINIC | Age: 88
End: 2023-08-22
Payer: MEDICARE

## 2023-08-22 VITALS
DIASTOLIC BLOOD PRESSURE: 80 MMHG | SYSTOLIC BLOOD PRESSURE: 134 MMHG | BODY MASS INDEX: 24.88 KG/M2 | HEIGHT: 59 IN | OXYGEN SATURATION: 98 % | HEART RATE: 65 BPM | WEIGHT: 123.4 LBS

## 2023-08-22 DIAGNOSIS — N18.31 STAGE 3A CHRONIC KIDNEY DISEASE (HCC): ICD-10-CM

## 2023-08-22 DIAGNOSIS — E78.5 DYSLIPIDEMIA: ICD-10-CM

## 2023-08-22 DIAGNOSIS — I25.119 CORONARY ARTERY DISEASE INVOLVING NATIVE CORONARY ARTERY OF NATIVE HEART WITH ANGINA PECTORIS (HCC): Primary | ICD-10-CM

## 2023-08-22 DIAGNOSIS — R09.89 BILATERAL CAROTID BRUITS: ICD-10-CM

## 2023-08-22 DIAGNOSIS — Z95.0 PACEMAKER: ICD-10-CM

## 2023-08-22 DIAGNOSIS — I10 ESSENTIAL HYPERTENSION: ICD-10-CM

## 2023-08-22 DIAGNOSIS — I49.5 SICK SINUS SYNDROME (HCC): ICD-10-CM

## 2023-08-22 PROCEDURE — 1123F ACP DISCUSS/DSCN MKR DOCD: CPT | Performed by: INTERNAL MEDICINE

## 2023-08-22 PROCEDURE — 99214 OFFICE O/P EST MOD 30 MIN: CPT | Performed by: INTERNAL MEDICINE

## 2023-08-22 ASSESSMENT — ENCOUNTER SYMPTOMS
STRIDOR: 0
CHEST TIGHTNESS: 0
BLOOD IN STOOL: 0
COUGH: 0
SHORTNESS OF BREATH: 0
WHEEZING: 0
RESPIRATORY NEGATIVE: 1
NAUSEA: 0
CONSTIPATION: 1
EYES NEGATIVE: 1

## 2023-08-22 NOTE — PROGRESS NOTES
Subsequent Progress Note    Patient: Krystle Naif  YOB: 1932  MRN: [id number]    Chief Complaint: cad palp htn   Chief Complaint   Patient presents with    Follow-up     4 month    Coronary Artery Disease       CV Data:  CAD PCI 2008 8/2017 echo 60-65  10/10/2018 CATH: Prior LAD stent mild to mod ISR, CX- mid 70-80 RCA diffuse-   CX CARLOS placed. PAULINE dissected and also required stent. 10/018 CUS mild  PPM  3/2019 PVR negative   9/21 CUs mild/mod  9/21 spect - negative  9/21 Echo EF 55 1+ MR RVSP 60 mmHg  9/22 CUS mild mod      Subjective/HPI: constipated and taking laxatives. No cp no jaramillo occ dizzy no recent falls no bleed    1/10/2020 feels well no co no sob eats well no falls no bleed. 8/19/2020 no cp no sob no falls no bleed takes meds eats well walks     11/13/2020 no cp no sob no falls no bleed. No plap eats well. 3/8/21 no cp no sob no falls no bleed takes meds eats well tries to be active    8/17/21 NOW HAS LEFT SIDE SHOULDER AND ARM PAIN. Took NTG SL and seemed to have helped. Causes sob. Hugo Oliver yesterday for the first time and lost consciousness. No bleed takes meds. 9/23/21 no further cp still having some shoulder discomfort both side. liekly related to remote fall 4 years ago. 3/10/22 doing well no cp no sob no falls no bleed. Eating well. Had some adb issues few months ago but resolved. 8/25/22 doing well no cp no  sob no falls no bleed still active    12/16/22 doing well no cp no sob no falls no bleed takes meds. No falls     4/19/23 doing very well gained wt. No cp no sob no bleed feels well. Upset she did not past her driving test.     8/54/09 doing well no cp no sob no falls no bleed    EKG: SR 70 LBBB    Lives alone. Daughter lives next door.        Past Medical History:   Diagnosis Date    Anxiety     CAD (coronary artery disease)     Depression     Heart disease     Hyperlipidemia     Hypertension     Hypothyroidism        Past Surgical History:   Procedure

## 2023-10-26 ENCOUNTER — HOSPITAL ENCOUNTER (OUTPATIENT)
Dept: ULTRASOUND IMAGING | Age: 88
Discharge: HOME | End: 2023-10-28
Attending: INTERNAL MEDICINE
Payer: MEDICARE

## 2023-10-26 DIAGNOSIS — R09.89 BILATERAL CAROTID BRUITS: ICD-10-CM

## 2023-10-26 PROCEDURE — 93880 EXTRACRANIAL BILAT STUDY: CPT

## 2023-10-26 PROCEDURE — 93880 EXTRACRANIAL BILAT STUDY: CPT | Performed by: INTERNAL MEDICINE

## 2023-10-27 LAB
VAS LEFT CCA DIST EDV: 17 CM/S
VAS LEFT CCA DIST PSV: 84 CM/S
VAS LEFT CCA MID EDV: 15.4 CM/S
VAS LEFT CCA MID PSV: 105 CM/S
VAS LEFT CCA PROX EDV: 13 CM/S
VAS LEFT CCA PROX PSV: 101 CM/S
VAS LEFT ECA EDV: 8.66 CM/S
VAS LEFT ECA PSV: 163 CM/S
VAS LEFT ICA DIST EDV: 27 CM/S
VAS LEFT ICA DIST PSV: 80.1 CM/S
VAS LEFT ICA MID EDV: 19.2 CM/S
VAS LEFT ICA MID PSV: 81.2 CM/S
VAS LEFT ICA PROX EDV: 20.5 CM/S
VAS LEFT ICA PROX PSV: 104 CM/S
VAS LEFT VERTEBRAL EDV: 6.74 CM/S
VAS LEFT VERTEBRAL PSV: 49.8 CM/S
VAS RIGHT CCA DIST EDV: 14.8 CM/S
VAS RIGHT CCA DIST PSV: 100 CM/S
VAS RIGHT CCA MID EDV: 11.8 CM/S
VAS RIGHT CCA MID PSV: 89.9 CM/S
VAS RIGHT CCA PROX EDV: 6.9 CM/S
VAS RIGHT CCA PROX PSV: 91.4 CM/S
VAS RIGHT ECA EDV: 4.33 CM/S
VAS RIGHT ECA PSV: 142 CM/S
VAS RIGHT ICA DIST EDV: 31.5 CM/S
VAS RIGHT ICA DIST PSV: 111 CM/S
VAS RIGHT ICA MID EDV: 24.1 CM/S
VAS RIGHT ICA MID PSV: 92.3 CM/S
VAS RIGHT ICA PROX EDV: 18.2 CM/S
VAS RIGHT ICA PROX PSV: 119 CM/S
VAS RIGHT VERTEBRAL EDV: 12.3 CM/S
VAS RIGHT VERTEBRAL PSV: 56.2 CM/S

## 2023-12-07 PROCEDURE — 93296 REM INTERROG EVL PM/IDS: CPT | Performed by: INTERNAL MEDICINE

## 2024-03-07 ENCOUNTER — HOSPITAL ENCOUNTER (OUTPATIENT)
Dept: CARDIOLOGY | Age: 89
Discharge: HOME | End: 2024-03-07
Payer: MEDICARE

## 2024-03-07 PROCEDURE — 93296 REM INTERROG EVL PM/IDS: CPT

## 2024-03-08 PROCEDURE — 93294 REM INTERROG EVL PM/LDLS PM: CPT | Performed by: INTERNAL MEDICINE

## 2024-04-21 ENCOUNTER — HOSPITAL ENCOUNTER (EMERGENCY)
Age: 89
Discharge: HOME | DRG: 378 | End: 2024-04-22
Payer: MEDICARE

## 2024-04-21 DIAGNOSIS — N39.0 URINARY TRACT INFECTION WITH HEMATURIA, SITE UNSPECIFIED: ICD-10-CM

## 2024-04-21 DIAGNOSIS — K92.2 LOWER GI BLEED: Primary | ICD-10-CM

## 2024-04-21 DIAGNOSIS — R31.9 URINARY TRACT INFECTION WITH HEMATURIA, SITE UNSPECIFIED: ICD-10-CM

## 2024-04-21 PROCEDURE — 99284 EMERGENCY DEPT VISIT MOD MDM: CPT

## 2024-04-22 VITALS
DIASTOLIC BLOOD PRESSURE: 70 MMHG | BODY MASS INDEX: 21 KG/M2 | WEIGHT: 123 LBS | HEIGHT: 64 IN | SYSTOLIC BLOOD PRESSURE: 141 MMHG | HEART RATE: 79 BPM | RESPIRATION RATE: 15 BRPM | OXYGEN SATURATION: 98 % | TEMPERATURE: 98.2 F

## 2024-04-22 LAB
ALBUMIN SERPL-MCNC: 3.5 G/DL (ref 3.5–4.6)
ALP SERPL-CCNC: 61 U/L (ref 40–130)
ALT SERPL-CCNC: 10 U/L (ref 0–33)
ANION GAP SERPL CALCULATED.3IONS-SCNC: 13 MEQ/L (ref 9–15)
APTT PPP: 22 SEC (ref 24.4–36.8)
AST SERPL-CCNC: 21 U/L (ref 0–35)
BACTERIA URNS QL MICRO: ABNORMAL /HPF
BASOPHILS # BLD: 0 K/UL (ref 0–0.2)
BASOPHILS NFR BLD: 0.4 %
BILIRUB SERPL-MCNC: <0.2 MG/DL (ref 0.2–0.7)
BILIRUB UR QL STRIP: NEGATIVE
BUN SERPL-MCNC: 35 MG/DL (ref 8–23)
CALCIUM SERPL-MCNC: 9.1 MG/DL (ref 8.5–9.9)
CHLORIDE SERPL-SCNC: 101 MEQ/L (ref 95–107)
CLARITY UR: ABNORMAL
CO2 SERPL-SCNC: 24 MEQ/L (ref 20–31)
COLOR UR: YELLOW
CREAT SERPL-MCNC: 1.84 MG/DL (ref 0.5–0.9)
EOSINOPHIL # BLD: 0.1 K/UL (ref 0–0.7)
EOSINOPHIL NFR BLD: 1.8 %
EPI CELLS #/AREA URNS AUTO: ABNORMAL /HPF (ref 0–5)
ERYTHROCYTE [DISTWIDTH] IN BLOOD BY AUTOMATED COUNT: 15.3 % (ref 11.5–14.5)
GLOBULIN SER CALC-MCNC: 3.5 G/DL (ref 2.3–3.5)
GLUCOSE SERPL-MCNC: 132 MG/DL (ref 70–99)
GLUCOSE UR STRIP-MCNC: NEGATIVE MG/DL
HCT VFR BLD AUTO: 32 % (ref 37–47)
HGB BLD-MCNC: 10.7 G/DL (ref 12–16)
HGB UR QL STRIP: ABNORMAL
HYALINE CASTS #/AREA URNS AUTO: ABNORMAL /HPF (ref 0–5)
INR PPP: 1
KETONES UR STRIP-MCNC: NEGATIVE MG/DL
LACTATE BLDV-SCNC: 1.3 MMOL/L (ref 0.5–2.2)
LEUKOCYTE ESTERASE UR QL STRIP: ABNORMAL
LYMPHOCYTES # BLD: 2.4 K/UL (ref 1–4.8)
LYMPHOCYTES NFR BLD: 31.6 %
MCH RBC QN AUTO: 30.8 PG (ref 27–31.3)
MCHC RBC AUTO-ENTMCNC: 33.4 % (ref 33–37)
MCV RBC AUTO: 92.2 FL (ref 79.4–94.8)
MONOCYTES # BLD: 0.9 K/UL (ref 0.2–0.8)
MONOCYTES NFR BLD: 11.5 %
NEUTROPHILS # BLD: 4.2 K/UL (ref 1.4–6.5)
NEUTS SEG NFR BLD: 54.3 %
NITRITE UR QL STRIP: NEGATIVE
PH UR STRIP: 5.5 [PH] (ref 5–9)
PLATELET # BLD AUTO: 189 K/UL (ref 130–400)
POTASSIUM SERPL-SCNC: 3.7 MEQ/L (ref 3.4–4.9)
PROT SERPL-MCNC: 7 G/DL (ref 6.3–8)
PROT UR STRIP-MCNC: >=300 MG/DL
PROTHROMBIN TIME: 13.3 SEC (ref 12.3–14.9)
RBC # BLD AUTO: 3.47 M/UL (ref 4.2–5.4)
RBC #/AREA URNS AUTO: ABNORMAL /HPF (ref 0–5)
SODIUM SERPL-SCNC: 138 MEQ/L (ref 135–144)
SP GR UR STRIP: 1.01 (ref 1–1.03)
URINE REFLEX TO CULTURE: YES
UROBILINOGEN UR STRIP-ACNC: 0.2 E.U./DL
WBC # BLD AUTO: 7.7 K/UL (ref 4.8–10.8)
WBC #/AREA URNS AUTO: ABNORMAL /HPF (ref 0–5)

## 2024-04-22 PROCEDURE — 85025 COMPLETE CBC W/AUTO DIFF WBC: CPT

## 2024-04-22 PROCEDURE — 36415 COLL VENOUS BLD VENIPUNCTURE: CPT

## 2024-04-22 PROCEDURE — 6370000000 HC RX 637 (ALT 250 FOR IP): Performed by: STUDENT IN AN ORGANIZED HEALTH CARE EDUCATION/TRAINING PROGRAM

## 2024-04-22 PROCEDURE — 87086 URINE CULTURE/COLONY COUNT: CPT

## 2024-04-22 PROCEDURE — 85730 THROMBOPLASTIN TIME PARTIAL: CPT

## 2024-04-22 PROCEDURE — 81001 URINALYSIS AUTO W/SCOPE: CPT

## 2024-04-22 PROCEDURE — 80053 COMPREHEN METABOLIC PANEL: CPT

## 2024-04-22 PROCEDURE — 83605 ASSAY OF LACTIC ACID: CPT

## 2024-04-22 PROCEDURE — 85610 PROTHROMBIN TIME: CPT

## 2024-04-22 RX ORDER — CEPHALEXIN 500 MG/1
500 CAPSULE ORAL 2 TIMES DAILY
Qty: 14 CAPSULE | Refills: 0 | Status: SHIPPED | OUTPATIENT
Start: 2024-04-22 | End: 2024-04-23 | Stop reason: ALTCHOICE

## 2024-04-22 RX ORDER — CEPHALEXIN 500 MG/1
500 CAPSULE ORAL 2 TIMES DAILY
Qty: 14 CAPSULE | Refills: 0 | Status: SHIPPED | OUTPATIENT
Start: 2024-04-22 | End: 2024-04-22

## 2024-04-22 RX ORDER — CEPHALEXIN 500 MG/1
500 CAPSULE ORAL ONCE
Status: COMPLETED | OUTPATIENT
Start: 2024-04-22 | End: 2024-04-22

## 2024-04-22 RX ADMIN — CEPHALEXIN 500 MG: 500 CAPSULE ORAL at 04:40

## 2024-04-22 ASSESSMENT — ENCOUNTER SYMPTOMS
COUGH: 0
CONSTIPATION: 0
NAUSEA: 0
SHORTNESS OF BREATH: 0
DIARRHEA: 0
ABDOMINAL PAIN: 0
VOMITING: 0
BLOOD IN STOOL: 1

## 2024-04-22 ASSESSMENT — LIFESTYLE VARIABLES
HOW OFTEN DO YOU HAVE A DRINK CONTAINING ALCOHOL: NEVER
HOW MANY STANDARD DRINKS CONTAINING ALCOHOL DO YOU HAVE ON A TYPICAL DAY: PATIENT DOES NOT DRINK

## 2024-04-22 ASSESSMENT — PAIN - FUNCTIONAL ASSESSMENT: PAIN_FUNCTIONAL_ASSESSMENT: NONE - DENIES PAIN

## 2024-04-22 NOTE — DISCHARGE INSTRUCTIONS
Take antibiotics as prescribed and to completion for urinary tract infection    Hold your Plavix today until you can follow-up with your regular doctor, please call your regular doctor today for a follow-up appointment in the next 1 to 2 days    Call the GI doctor to make a follow-up appointment regarding your rectal bleeding      For pain use acetaminophen (Tylenol) unless prescribed medications that have acetaminophen in it.  You can take over the counter acetaminophen tablets (1 - 2 tablets of the 500-mg strength every 6 hours).    PLEASE RETURN TO THE EMERGENCY DEPARTMENT IMMEDIATELY for worsening symptoms, increase in the amount of blood coming from your rectum (butt), feeling light-headed / dizzy, heart racing, abdominal pain, or if you develop any concerning symptoms such as: high fever not relieved by acetaminophen (Tylenol) and/or ibuprofen (Motrin / Advil), chills, shortness of breath, chest pain, feeling of your heart fluttering or racing, persistent nausea and/or vomiting, vomiting up blood, blood in your stool, loss of consciousness, numbness, weakness or tingling in the arms or legs or change in color of the extremities, changes in mental status, persistent headache, blurry vision loss of bladder / bowel control, unable to follow up with your physician, or other any other care or concern.

## 2024-04-22 NOTE — ED TRIAGE NOTES
Bloody stools x2 that started tonight at 2000. Take Plavix. Pt states never happened before. First was bright red and second a little darker

## 2024-04-22 NOTE — ED PROVIDER NOTES
increase in the amount of blood coming from rectum, dizziness, heart racing, abdominal pain, high fever not relieved by Tylenol) and/or Motrin, chills, shortness of breath, chest pain, persistent nausea and/or vomiting, hematemesis, loss of consciousness, numbness, weakness or tingling in the arms or legs or change in color of the extremities, changes in mental status, persistent headache, blurry vision loss of bladder/bowel control, unable to follow up with her PCP, or other any other care/concern. Patient is afebrile, nontoxic, not tachycardic, and hemodynamically stable at time of discharge. Patient expresses understanding and agreement with this plan.     REASSESSMENT         CRITICAL CARE TIME   Total Critical Care time was 0 minutes, excluding separately reportable procedures.      CONSULTS:  None    PROCEDURES:  Unless otherwise noted below, none     Procedures        FINAL IMPRESSION      1. Lower GI bleed    2. Urinary tract infection with hematuria, site unspecified          DISPOSITION/PLAN   DISPOSITION Decision To Discharge 04/22/2024 04:20:44 AM      PATIENT REFERRED TO:  Dillon Rai MD  [address] [facility] [id number] [phone]    Call today      Sarbjit Chapin, DO  [address] [facility] [id number] [phone]    Call today        DISCHARGE MEDICATIONS:  Discharge Medication List as of 4/22/2024  4:20 AM        START taking these medications    Details   cephALEXin (KEFLEX) 500 MG capsule Take 1 capsule by mouth 2 times daily for 7 days, Disp-14 capsule, R-0Print           Controlled Substances Monitoring:          No data to display                (Please note that portions of this note were completed with a voice recognition program.  Efforts were made to edit the dictations but occasionally words are mis-transcribed.)    Sandi Alba PA-C (electronically signed)  Attending Emergency Physician  Supervising Physician Sandi Patton PA-C  04/24/24 0672

## 2024-04-23 ENCOUNTER — HOSPITAL ENCOUNTER (INPATIENT)
Age: 89
LOS: 2 days | Discharge: HOME | DRG: 378 | End: 2024-04-25
Attending: EMERGENCY MEDICINE | Admitting: INTERNAL MEDICINE
Payer: MEDICARE

## 2024-04-23 ENCOUNTER — OFFICE VISIT (OUTPATIENT)
Dept: CARDIOLOGY CLINIC | Age: 89
End: 2024-04-23
Payer: MEDICARE

## 2024-04-23 VITALS
DIASTOLIC BLOOD PRESSURE: 62 MMHG | HEIGHT: 64 IN | OXYGEN SATURATION: 97 % | SYSTOLIC BLOOD PRESSURE: 136 MMHG | WEIGHT: 121.6 LBS | BODY MASS INDEX: 20.76 KG/M2 | HEART RATE: 78 BPM

## 2024-04-23 DIAGNOSIS — I10 ESSENTIAL HYPERTENSION: ICD-10-CM

## 2024-04-23 DIAGNOSIS — R09.89 BILATERAL CAROTID BRUITS: ICD-10-CM

## 2024-04-23 DIAGNOSIS — Z95.0 PACEMAKER: ICD-10-CM

## 2024-04-23 DIAGNOSIS — E78.5 DYSLIPIDEMIA: ICD-10-CM

## 2024-04-23 DIAGNOSIS — I49.5 SICK SINUS SYNDROME (HCC): ICD-10-CM

## 2024-04-23 DIAGNOSIS — N18.31 STAGE 3A CHRONIC KIDNEY DISEASE (HCC): ICD-10-CM

## 2024-04-23 DIAGNOSIS — K92.2 GASTROINTESTINAL HEMORRHAGE, UNSPECIFIED GASTROINTESTINAL HEMORRHAGE TYPE: Primary | ICD-10-CM

## 2024-04-23 DIAGNOSIS — I25.119 CORONARY ARTERY DISEASE INVOLVING NATIVE CORONARY ARTERY OF NATIVE HEART WITH ANGINA PECTORIS (HCC): Primary | ICD-10-CM

## 2024-04-23 DIAGNOSIS — D62 ANEMIA DUE TO ACUTE BLOOD LOSS: ICD-10-CM

## 2024-04-23 LAB
ABO + RH BLD: NORMAL
ALBUMIN SERPL-MCNC: 3.7 G/DL (ref 3.5–4.6)
ALP SERPL-CCNC: 54 U/L (ref 40–130)
ALT SERPL-CCNC: 11 U/L (ref 0–33)
ANION GAP SERPL CALCULATED.3IONS-SCNC: 14 MEQ/L (ref 9–15)
AST SERPL-CCNC: 24 U/L (ref 0–35)
BACTERIA UR CULT: NORMAL
BASOPHILS # BLD: 0.1 K/UL (ref 0–0.2)
BASOPHILS NFR BLD: 0.7 %
BILIRUB SERPL-MCNC: 0.3 MG/DL (ref 0.2–0.7)
BLD GP AB SCN SERPL QL: NORMAL
BUN SERPL-MCNC: 44 MG/DL (ref 8–23)
CALCIUM SERPL-MCNC: 8.8 MG/DL (ref 8.5–9.9)
CHLORIDE SERPL-SCNC: 102 MEQ/L (ref 95–107)
CO2 SERPL-SCNC: 22 MEQ/L (ref 20–31)
CREAT SERPL-MCNC: 2.15 MG/DL (ref 0.5–0.9)
EOSINOPHIL # BLD: 0.1 K/UL (ref 0–0.7)
EOSINOPHIL NFR BLD: 1 %
ERYTHROCYTE [DISTWIDTH] IN BLOOD BY AUTOMATED COUNT: 15.3 % (ref 11.5–14.5)
GLOBULIN SER CALC-MCNC: 3.1 G/DL (ref 2.3–3.5)
GLUCOSE SERPL-MCNC: 108 MG/DL (ref 70–99)
HCT VFR BLD AUTO: 23 % (ref 37–47)
HCT VFR BLD AUTO: 25.4 % (ref 37–47)
HGB BLD-MCNC: 7.7 G/DL (ref 12–16)
HGB BLD-MCNC: 8.4 G/DL (ref 12–16)
INR PPP: 1.1
LYMPHOCYTES # BLD: 2.8 K/UL (ref 1–4.8)
LYMPHOCYTES NFR BLD: 39 %
MCH RBC QN AUTO: 30.7 PG (ref 27–31.3)
MCHC RBC AUTO-ENTMCNC: 33.5 % (ref 33–37)
MCV RBC AUTO: 91.6 FL (ref 79.4–94.8)
MONOCYTES # BLD: 0.7 K/UL (ref 0.2–0.8)
MONOCYTES NFR BLD: 9.5 %
NEUTROPHILS # BLD: 3.6 K/UL (ref 1.4–6.5)
NEUTS SEG NFR BLD: 49.7 %
PLATELET # BLD AUTO: 170 K/UL (ref 130–400)
POTASSIUM SERPL-SCNC: 3.9 MEQ/L (ref 3.4–4.9)
PROT SERPL-MCNC: 6.8 G/DL (ref 6.3–8)
PROTHROMBIN TIME: 13.9 SEC (ref 12.3–14.9)
RBC # BLD AUTO: 2.51 M/UL (ref 4.2–5.4)
SODIUM SERPL-SCNC: 138 MEQ/L (ref 135–144)
WBC # BLD AUTO: 7.2 K/UL (ref 4.8–10.8)

## 2024-04-23 PROCEDURE — 99215 OFFICE O/P EST HI 40 MIN: CPT | Performed by: INTERNAL MEDICINE

## 2024-04-23 PROCEDURE — 99285 EMERGENCY DEPT VISIT HI MDM: CPT

## 2024-04-23 PROCEDURE — 85014 HEMATOCRIT: CPT

## 2024-04-23 PROCEDURE — 93005 ELECTROCARDIOGRAM TRACING: CPT | Performed by: INTERNAL MEDICINE

## 2024-04-23 PROCEDURE — 36415 COLL VENOUS BLD VENIPUNCTURE: CPT

## 2024-04-23 PROCEDURE — 83540 ASSAY OF IRON: CPT

## 2024-04-23 PROCEDURE — 1123F ACP DISCUSS/DSCN MKR DOCD: CPT | Performed by: INTERNAL MEDICINE

## 2024-04-23 PROCEDURE — P9016 RBC LEUKOCYTES REDUCED: HCPCS

## 2024-04-23 PROCEDURE — 85610 PROTHROMBIN TIME: CPT

## 2024-04-23 PROCEDURE — 82746 ASSAY OF FOLIC ACID SERUM: CPT

## 2024-04-23 PROCEDURE — 86923 COMPATIBILITY TEST ELECTRIC: CPT

## 2024-04-23 PROCEDURE — 6370000000 HC RX 637 (ALT 250 FOR IP): Performed by: EMERGENCY MEDICINE

## 2024-04-23 PROCEDURE — 86901 BLOOD TYPING SEROLOGIC RH(D): CPT

## 2024-04-23 PROCEDURE — 83550 IRON BINDING TEST: CPT

## 2024-04-23 PROCEDURE — C9113 INJ PANTOPRAZOLE SODIUM, VIA: HCPCS | Performed by: EMERGENCY MEDICINE

## 2024-04-23 PROCEDURE — 86850 RBC ANTIBODY SCREEN: CPT

## 2024-04-23 PROCEDURE — 96374 THER/PROPH/DIAG INJ IV PUSH: CPT

## 2024-04-23 PROCEDURE — 86900 BLOOD TYPING SEROLOGIC ABO: CPT

## 2024-04-23 PROCEDURE — 1210000000 HC MED SURG R&B

## 2024-04-23 PROCEDURE — 6370000000 HC RX 637 (ALT 250 FOR IP): Performed by: INTERNAL MEDICINE

## 2024-04-23 PROCEDURE — 6360000002 HC RX W HCPCS: Performed by: EMERGENCY MEDICINE

## 2024-04-23 PROCEDURE — 2580000003 HC RX 258: Performed by: INTERNAL MEDICINE

## 2024-04-23 PROCEDURE — 2580000003 HC RX 258: Performed by: EMERGENCY MEDICINE

## 2024-04-23 PROCEDURE — 85025 COMPLETE CBC W/AUTO DIFF WBC: CPT

## 2024-04-23 PROCEDURE — 6360000002 HC RX W HCPCS: Performed by: INTERNAL MEDICINE

## 2024-04-23 PROCEDURE — 85018 HEMOGLOBIN: CPT

## 2024-04-23 PROCEDURE — 80053 COMPREHEN METABOLIC PANEL: CPT

## 2024-04-23 PROCEDURE — 82728 ASSAY OF FERRITIN: CPT

## 2024-04-23 PROCEDURE — 93005 ELECTROCARDIOGRAM TRACING: CPT | Performed by: EMERGENCY MEDICINE

## 2024-04-23 PROCEDURE — 82607 VITAMIN B-12: CPT

## 2024-04-23 RX ORDER — SERTRALINE HYDROCHLORIDE 100 MG/1
100 TABLET, FILM COATED ORAL DAILY
COMMUNITY

## 2024-04-23 RX ORDER — ONDANSETRON 2 MG/ML
4 INJECTION INTRAMUSCULAR; INTRAVENOUS EVERY 6 HOURS PRN
Status: DISCONTINUED | OUTPATIENT
Start: 2024-04-23 | End: 2024-04-25 | Stop reason: HOSPADM

## 2024-04-23 RX ORDER — CHLORTHALIDONE 25 MG/1
25 TABLET ORAL DAILY
Status: DISCONTINUED | OUTPATIENT
Start: 2024-04-23 | End: 2024-04-25 | Stop reason: HOSPADM

## 2024-04-23 RX ORDER — ACETAMINOPHEN 650 MG/1
650 SUPPOSITORY RECTAL EVERY 6 HOURS PRN
Status: DISCONTINUED | OUTPATIENT
Start: 2024-04-23 | End: 2024-04-25 | Stop reason: HOSPADM

## 2024-04-23 RX ORDER — LABETALOL HYDROCHLORIDE 5 MG/ML
10 INJECTION, SOLUTION INTRAVENOUS EVERY 4 HOURS PRN
Status: DISCONTINUED | OUTPATIENT
Start: 2024-04-23 | End: 2024-04-25 | Stop reason: HOSPADM

## 2024-04-23 RX ORDER — ACETAMINOPHEN 160 MG
2000 TABLET,DISINTEGRATING ORAL DAILY
COMMUNITY

## 2024-04-23 RX ORDER — LEVOTHYROXINE SODIUM 0.05 MG/1
50 TABLET ORAL DAILY
Status: DISCONTINUED | OUTPATIENT
Start: 2024-04-24 | End: 2024-04-25 | Stop reason: HOSPADM

## 2024-04-23 RX ORDER — CARVEDILOL 25 MG/1
25 TABLET ORAL 2 TIMES DAILY WITH MEALS
COMMUNITY

## 2024-04-23 RX ORDER — CEPHALEXIN 500 MG/1
500 CAPSULE ORAL 2 TIMES DAILY
Status: ON HOLD | COMMUNITY
Start: 2024-04-22 | End: 2024-04-25 | Stop reason: HOSPADM

## 2024-04-23 RX ORDER — METHYLPREDNISOLONE 4 MG/1
4 TABLET ORAL DAILY
COMMUNITY

## 2024-04-23 RX ORDER — PRAVASTATIN SODIUM 40 MG
40 TABLET ORAL DAILY
COMMUNITY

## 2024-04-23 RX ORDER — CLOPIDOGREL BISULFATE 75 MG/1
75 TABLET ORAL DAILY
Status: ON HOLD | COMMUNITY
End: 2024-04-25 | Stop reason: HOSPADM

## 2024-04-23 RX ORDER — SODIUM CHLORIDE 0.9 % (FLUSH) 0.9 %
5-40 SYRINGE (ML) INJECTION EVERY 12 HOURS SCHEDULED
Status: DISCONTINUED | OUTPATIENT
Start: 2024-04-23 | End: 2024-04-25 | Stop reason: HOSPADM

## 2024-04-23 RX ORDER — SODIUM CHLORIDE, SODIUM LACTATE, POTASSIUM CHLORIDE, CALCIUM CHLORIDE 600; 310; 30; 20 MG/100ML; MG/100ML; MG/100ML; MG/100ML
INJECTION, SOLUTION INTRAVENOUS CONTINUOUS
Status: DISCONTINUED | OUTPATIENT
Start: 2024-04-23 | End: 2024-04-24

## 2024-04-23 RX ORDER — POLYETHYLENE GLYCOL 3350 17 G/17G
17 POWDER, FOR SOLUTION ORAL DAILY PRN
Status: DISCONTINUED | OUTPATIENT
Start: 2024-04-23 | End: 2024-04-25 | Stop reason: HOSPADM

## 2024-04-23 RX ORDER — LISINOPRIL 20 MG/1
20 TABLET ORAL DAILY
Status: DISCONTINUED | OUTPATIENT
Start: 2024-04-23 | End: 2024-04-25 | Stop reason: HOSPADM

## 2024-04-23 RX ORDER — SODIUM CHLORIDE 9 MG/ML
INJECTION, SOLUTION INTRAVENOUS PRN
Status: DISCONTINUED | OUTPATIENT
Start: 2024-04-23 | End: 2024-04-24

## 2024-04-23 RX ORDER — CARVEDILOL 25 MG/1
25 TABLET ORAL 2 TIMES DAILY WITH MEALS
Status: DISCONTINUED | OUTPATIENT
Start: 2024-04-23 | End: 2024-04-25 | Stop reason: HOSPADM

## 2024-04-23 RX ORDER — SERTRALINE HYDROCHLORIDE 100 MG/1
100 TABLET, FILM COATED ORAL DAILY
Status: DISCONTINUED | OUTPATIENT
Start: 2024-04-23 | End: 2024-04-25 | Stop reason: HOSPADM

## 2024-04-23 RX ORDER — AMLODIPINE BESYLATE 5 MG/1
5 TABLET ORAL DAILY
Status: DISCONTINUED | OUTPATIENT
Start: 2024-04-23 | End: 2024-04-25 | Stop reason: HOSPADM

## 2024-04-23 RX ORDER — ACETAMINOPHEN 325 MG/1
650 TABLET ORAL EVERY 6 HOURS PRN
Status: DISCONTINUED | OUTPATIENT
Start: 2024-04-23 | End: 2024-04-25 | Stop reason: HOSPADM

## 2024-04-23 RX ORDER — LISINOPRIL 20 MG/1
20 TABLET ORAL DAILY
COMMUNITY

## 2024-04-23 RX ORDER — ONDANSETRON 4 MG/1
4 TABLET, ORALLY DISINTEGRATING ORAL EVERY 8 HOURS PRN
Status: DISCONTINUED | OUTPATIENT
Start: 2024-04-23 | End: 2024-04-25 | Stop reason: HOSPADM

## 2024-04-23 RX ORDER — CHLORTHALIDONE 25 MG/1
25 TABLET ORAL DAILY
COMMUNITY

## 2024-04-23 RX ORDER — RANOLAZINE 500 MG/1
500 TABLET, EXTENDED RELEASE ORAL 2 TIMES DAILY
Status: DISCONTINUED | OUTPATIENT
Start: 2024-04-23 | End: 2024-04-25 | Stop reason: HOSPADM

## 2024-04-23 RX ORDER — SODIUM CHLORIDE 0.9 % (FLUSH) 0.9 %
5-40 SYRINGE (ML) INJECTION PRN
Status: DISCONTINUED | OUTPATIENT
Start: 2024-04-23 | End: 2024-04-25 | Stop reason: HOSPADM

## 2024-04-23 RX ORDER — PEG-3350, SODIUM SULFATE, SODIUM CHLORIDE, POTASSIUM CHLORIDE, SODIUM ASCORBATE AND ASCORBIC ACID 7.5-2.691G
100 KIT ORAL ONCE
Status: COMPLETED | OUTPATIENT
Start: 2024-04-23 | End: 2024-04-23

## 2024-04-23 RX ORDER — RANOLAZINE 500 MG/1
500 TABLET, EXTENDED RELEASE ORAL 2 TIMES DAILY
COMMUNITY

## 2024-04-23 RX ORDER — PRAVASTATIN SODIUM 40 MG
40 TABLET ORAL DAILY
Status: DISCONTINUED | OUTPATIENT
Start: 2024-04-23 | End: 2024-04-25 | Stop reason: HOSPADM

## 2024-04-23 RX ADMIN — CHLORTHALIDONE 25 MG: 25 TABLET ORAL at 21:36

## 2024-04-23 RX ADMIN — PANTOPRAZOLE SODIUM 80 MG: 40 INJECTION, POWDER, FOR SOLUTION INTRAVENOUS at 15:50

## 2024-04-23 RX ADMIN — PRAVASTATIN SODIUM 40 MG: 40 TABLET ORAL at 21:36

## 2024-04-23 RX ADMIN — SERTRALINE 100 MG: 100 TABLET, FILM COATED ORAL at 21:37

## 2024-04-23 RX ADMIN — LISINOPRIL 20 MG: 20 TABLET ORAL at 21:37

## 2024-04-23 RX ADMIN — LABETALOL HYDROCHLORIDE 10 MG: 5 INJECTION, SOLUTION INTRAVENOUS at 18:39

## 2024-04-23 RX ADMIN — PEG-3350, SODIUM SULFATE, SODIUM CHLORIDE, POTASSIUM CHLORIDE, SODIUM ASCORBATE AND ASCORBIC ACID 100 G: KIT at 16:25

## 2024-04-23 RX ADMIN — CARVEDILOL 25 MG: 25 TABLET, FILM COATED ORAL at 21:37

## 2024-04-23 RX ADMIN — AMLODIPINE BESYLATE 5 MG: 5 TABLET ORAL at 21:37

## 2024-04-23 RX ADMIN — RANOLAZINE 500 MG: 500 TABLET, FILM COATED, EXTENDED RELEASE ORAL at 21:36

## 2024-04-23 RX ADMIN — Medication 10 ML: at 21:41

## 2024-04-23 RX ADMIN — SODIUM CHLORIDE, POTASSIUM CHLORIDE, SODIUM LACTATE AND CALCIUM CHLORIDE: 600; 310; 30; 20 INJECTION, SOLUTION INTRAVENOUS at 18:00

## 2024-04-23 ASSESSMENT — ENCOUNTER SYMPTOMS
SORE THROAT: 0
VOMITING: 0
COUGH: 0
CHEST TIGHTNESS: 0
CHEST TIGHTNESS: 0
STRIDOR: 0
WHEEZING: 0
BLOOD IN STOOL: 0
SHORTNESS OF BREATH: 0
NAUSEA: 0
ABDOMINAL PAIN: 0
NAUSEA: 0
BLOOD IN STOOL: 1
RESPIRATORY NEGATIVE: 1
EYE PAIN: 0
SHORTNESS OF BREATH: 0
EYES NEGATIVE: 1
CONSTIPATION: 1

## 2024-04-23 ASSESSMENT — LIFESTYLE VARIABLES
HOW MANY STANDARD DRINKS CONTAINING ALCOHOL DO YOU HAVE ON A TYPICAL DAY: PATIENT DOES NOT DRINK
HOW OFTEN DO YOU HAVE A DRINK CONTAINING ALCOHOL: NEVER

## 2024-04-23 ASSESSMENT — PAIN - FUNCTIONAL ASSESSMENT: PAIN_FUNCTIONAL_ASSESSMENT: NONE - DENIES PAIN

## 2024-04-23 NOTE — ED NOTES
Dr. Purdy called back at [phone]  
Dr. Rai called back at [phone]  
GI paged at [phone]  
Hosp paged at [phone]  
Report called to 4 W RN.   
Verified patient name, date of birth, with the patient ID band with the patient and the monitor room tech on the phone. Verified rythm and rate with monitor tech.  
tablet Take 1 tablet by mouth Daily 10/13/17   Ryanne Guaman MD   clopidogrel (PLAVIX) 75 MG tablet Take 1 tablet by mouth daily 10/13/17 4/23/24  Ryanne Guaman MD   chlorthalidone (HYGROTON) 25 MG tablet Take 1 tablet by mouth daily 5/24/17 4/23/24  ProviderDolly MD   LORazepam (ATIVAN) 1 MG tablet Take 1 tablet by mouth every 6 hours as needed.  4/23/24  Provider, MD Dolly       ALLERGIES   She is allergic to other and sulfa antibiotics.    Patient is no longer taking:   Duplicate Norvasc dose  Chlorthalidone  Kcl  Lisinopril  Ativan  Pravastatin  Venlafaxine    Of note, patient was told to stop Plavix related to bleeding. Reported last dose on 4/22 per nursing note. Patient was unable to be interviewed prior to leaving ED. Timing of last doses unable to be updated.     Thanks,     Virgen Aguirre, PharmD 4/23/2024 5:13 PM  Clinical Pharmacy Specialist, Emergency Medicine  [phone]    Clinical Pharmacy Admission Med Reconciliation/ Review Tracking    Total # of Interventions: 7   Discontinued meds 7  Interventions requiring provider assessment:   NA    Time Spent (min): 30

## 2024-04-23 NOTE — H&P
Hospital Medicine  History and Physical    Patient:  Yael Ervin  MRN: [id number]    CHIEF COMPLAINT:    Chief Complaint   Patient presents with    Rectal Bleeding     Bleeding rectally x 3 days        History Obtained From:  Patient, EMR  Primary Care Physician: Sarbjit Chapin DO    HISTORY OF PRESENT ILLNESS:   The patient is a 89 y.o. female with PMH of CAD s/p PCI to LAD in 2007 and LCX in 2018 complicated by PAULINE dissection s/p stent, SSS s/p PPM in 2007, LBBB, HTN, CKD3b who presented with the above CC. Patient has been experiencing rectal bleeding for the last few days, takes Plavix at home, denies nausea, vomiting or abdominal pain, was hypertensive with SBP in the 180s on arrival, Hb - 7.7, case was discussed with GI, IV Protonix given, Moviprep ordered, admitted for further management.    Past Medical History:      Diagnosis Date    Anxiety     CAD (coronary artery disease)     Depression     Heart disease     Hyperlipidemia     Hypertension     Hypothyroidism        Past Surgical History:      Procedure Laterality Date    BREAST CYST ASPIRATION Right 1/17/14    U/S guided core bx of the right breast    CORONARY ANGIOPLASTY WITH STENT PLACEMENT      DIAGNOSTIC CARDIAC CATH LAB PROCEDURE      HYSTERECTOMY (CERVIX STATUS UNKNOWN)      PACEMAKER INSERTION         Medications Prior to Admission:    Prior to Admission medications    Medication Sig Start Date End Date Taking? Authorizing Provider   cephALEXin (KEFLEX) 500 MG capsule Take 1 capsule by mouth 2 times daily for 7 days 4/22/24 4/29/24  Sandi Alba PA-C   amLODIPine (NORVASC) 5 MG tablet take 1 tablet by mouth once daily 90 12/5/22   Provider, MD Dolly   nitroGLYCERIN (NITROSTAT) 0.4 MG SL tablet place 1 tablet under the tongue if needed every 5 minutes for chest pain for 3 doses IF NO RELIEF AFTER 3RD DOSE CALL PRESCRIBER . 1/5/22   Khoi Haro MD   amLODIPine (NORVASC) 2.5 MG tablet Take 1 tablet by mouth daily    Provider,

## 2024-04-23 NOTE — PROGRESS NOTES
Pt's /73 (103), HR 88. PRN Labetalol given per MAR. Dr. Purdy and bedside nurse JOSUÉ Anguiano made aware. Per Dr. Purdy, OK to pause IVF until midnight. Family at bedside.    Electronically signed by JORDIN CASTRO RN on 4/23/24 at 6:47 PM EDT

## 2024-04-23 NOTE — ED PROVIDER NOTES
following components:       Result Value    RBC 2.51 (*)     Hemoglobin 7.7 (*)     Hematocrit 23.0 (*)     RDW 15.3 (*)     All other components within normal limits   COMPREHENSIVE METABOLIC PANEL - Abnormal; Notable for the following components:    Glucose 108 (*)     BUN 44 (*)     Creatinine 2.15 (*)     Est, Glom Filt Rate 21.1 (*)     All other components within normal limits   HEMOGLOBIN AND HEMATOCRIT - Abnormal; Notable for the following components:    Hemoglobin 8.4 (*)     Hematocrit 25.4 (*)     All other components within normal limits   PROTIME-INR   IRON AND TIBC   FERRITIN   VITAMIN B12 & FOLATE   HEMOGLOBIN AND HEMATOCRIT   CBC WITH AUTO DIFFERENTIAL   RENAL FUNCTION PANEL   MAGNESIUM   TYPE AND SCREEN       All other labs were within normal range or not returned as of this dictation.    EMERGENCY DEPARTMENT COURSE and DIFFERENTIAL DIAGNOSIS/MDM:   Vitals:    Vitals:    04/23/24 1545 04/23/24 1630 04/23/24 1717 04/23/24 1830   BP: (!) 180/84 (!) 163/73 (!) 172/88 (!) 193/73   Pulse: 75 73 (!) 104 88   Resp: 17 16 18 18   Temp:   97.3 °F (36.3 °C)    TempSrc:   Oral    SpO2: 100% 99% 100% 100%   Weight:       Height:           Patient presents with GI bleeding.  Was seen yesterday and had a hemoglobin of 10.7.  Today it is 7.7.  She is on Plavix but not aspirin and no anticoagulants.  Vital signs are stable.  No abdominal pain.  Patient to be admitted.  IV Protonix started.  Obviously will hold Plavix.  Consult gastroenterology.  Admit to the hospitalist.  Patient has been typed and screened at this point but no blood products have been ordered at this point30    Medical Decision Making  Amount and/or Complexity of Data Reviewed  Labs: ordered.  ECG/medicine tests: ordered.    Risk  Prescription drug management.  Decision regarding hospitalization.          REASSESSMENT          CRITICAL CARE TIME   Total Critical Care time was 30 minutes, excluding separately reportable procedures.  There was a

## 2024-04-23 NOTE — PROGRESS NOTES
Subsequent Progress Note    Patient: Yael Ervin  YOB: 1932  MRN: [id number]    Chief Complaint: cad palp htn   Chief Complaint   Patient presents with    Follow-up     4 month    Coronary Artery Disease       CV Data:  CAD PCI 2008 8/2017 echo 60-65  10/10/2018 CATH: Prior LAD stent mild to mod ISR, CX- mid 70-80 RCA diffuse-   CX CARLOS placed.  PAULINE dissected and also required stent.  10/018 CUS mild  PPM  3/2019 PVR negative   9/21 CUs mild/mod  9/21 spect - negative  9/21 Echo EF 55 1+ MR RVSP 60 mmHg  9/22 CUS mild mod  10/23 CUS mild       Subjective/HPI: constipated and taking laxatives. No cp no jaramillo occ dizzy no recent falls no bleed    1/10/2020 feels well no co no sob eats well no falls no bleed.      8/19/2020 no cp no sob no falls no bleed takes meds eats well walks     11/13/2020 no cp no sob no falls no bleed. No plap eats well.     3/8/21 no cp no sob no falls no bleed takes meds eats well tries to be active    8/17/21 NOW HAS LEFT SIDE SHOULDER AND ARM PAIN. Took NTG SL and seemed to have helped. Causes sob.  Fell yesterday for the first time and lost consciousness. No bleed takes meds.     9/23/21 no further cp still having some shoulder discomfort both side. liekly related to remote fall 4 years ago.     3/10/22 doing well no cp no sob no falls no bleed. Eating well.  Had some adb issues few months ago but resolved.     8/25/22 doing well no cp no  sob no falls no bleed still active    12/16/22 doing well no cp no sob no falls no bleed takes meds. No falls     4/19/23 doing very well gained wt. No cp no sob no bleed feels well.  Upset she did not past her driving test.     8/22/23 doing well no cp no sob no falls no bleed    12/22/23 doing well no cp no sob no falls no bleed eats well. Takes meds.     4/23/24 NO CP NO SOB NO falls.  Had rectal bleed ( Maroon and black Stool). They did not stop Plavix and she is still on it. She had BRB last PM. She did not eat to day as she did not want

## 2024-04-23 NOTE — ED TRIAGE NOTES
Patient arrived via private car from Dr. Haro due to rectal bleeding at this time. Patient states rectal bleeding since Sunday, none today. Patient states she gets dizzy at times, denies any pain, last dose of Plavix yesterday

## 2024-04-24 ENCOUNTER — ANESTHESIA EVENT (OUTPATIENT)
Dept: ENDOSCOPY | Age: 89
DRG: 378 | End: 2024-04-24
Payer: MEDICARE

## 2024-04-24 ENCOUNTER — ANESTHESIA (OUTPATIENT)
Dept: ENDOSCOPY | Age: 89
DRG: 378 | End: 2024-04-24
Payer: MEDICARE

## 2024-04-24 ENCOUNTER — PREP FOR PROCEDURE (OUTPATIENT)
Dept: GASTROENTEROLOGY | Age: 89
End: 2024-04-24

## 2024-04-24 LAB
ALBUMIN SERPL-MCNC: 3.2 G/DL (ref 3.5–4.6)
ANION GAP SERPL CALCULATED.3IONS-SCNC: 12 MEQ/L (ref 9–15)
BASOPHILS # BLD: 0 K/UL (ref 0–0.2)
BASOPHILS NFR BLD: 0.4 %
BLOOD BANK DISPENSE STATUS: NORMAL
BLOOD BANK PRODUCT CODE: NORMAL
BPU ID: NORMAL
BUN SERPL-MCNC: 41 MG/DL (ref 8–23)
CALCIUM SERPL-MCNC: 8.4 MG/DL (ref 8.5–9.9)
CHLORIDE SERPL-SCNC: 111 MEQ/L (ref 95–107)
CO2 SERPL-SCNC: 22 MEQ/L (ref 20–31)
CREAT SERPL-MCNC: 1.89 MG/DL (ref 0.5–0.9)
DESCRIPTION BLOOD BANK: NORMAL
EOSINOPHIL # BLD: 0 K/UL (ref 0–0.7)
EOSINOPHIL NFR BLD: 0.7 %
ERYTHROCYTE [DISTWIDTH] IN BLOOD BY AUTOMATED COUNT: 15.4 % (ref 11.5–14.5)
FERRITIN: 207 NG/ML (ref 13–150)
FOLATE: 15.8 NG/ML (ref 4.8–24.2)
GLUCOSE SERPL-MCNC: 112 MG/DL (ref 70–99)
HCT VFR BLD AUTO: 19 % (ref 37–47)
HCT VFR BLD AUTO: 21.4 % (ref 37–47)
HCT VFR BLD AUTO: 27.2 % (ref 37–47)
HGB BLD-MCNC: 6.5 G/DL (ref 12–16)
HGB BLD-MCNC: 7.4 G/DL (ref 12–16)
HGB BLD-MCNC: 9.2 G/DL (ref 12–16)
IRON % SATURATION: 29 % (ref 20–55)
IRON: 71 UG/DL (ref 37–145)
LYMPHOCYTES # BLD: 1.5 K/UL (ref 1–4.8)
LYMPHOCYTES NFR BLD: 25.5 %
MAGNESIUM SERPL-MCNC: 2.5 MG/DL (ref 1.7–2.4)
MCH RBC QN AUTO: 30.8 PG (ref 27–31.3)
MCHC RBC AUTO-ENTMCNC: 34.2 % (ref 33–37)
MCV RBC AUTO: 90 FL (ref 79.4–94.8)
MONOCYTES # BLD: 0.5 K/UL (ref 0.2–0.8)
MONOCYTES NFR BLD: 9.3 %
NEUTROPHILS # BLD: 3.6 K/UL (ref 1.4–6.5)
NEUTS SEG NFR BLD: 63.7 %
PHOSPHATE SERPL-MCNC: 3.8 MG/DL (ref 2.3–4.8)
PLATELET # BLD AUTO: 135 K/UL (ref 130–400)
POTASSIUM SERPL-SCNC: 3.9 MEQ/L (ref 3.4–4.9)
RBC # BLD AUTO: 2.11 M/UL (ref 4.2–5.4)
SODIUM SERPL-SCNC: 145 MEQ/L (ref 135–144)
TOTAL IRON BINDING CAPACITY: 241 UG/DL (ref 250–450)
UNSATURATED IRON BINDING CAPACITY: 170 UG/DL (ref 112–347)
VITAMIN B-12: 751 PG/ML (ref 232–1245)
WBC # BLD AUTO: 5.7 K/UL (ref 4.8–10.8)

## 2024-04-24 PROCEDURE — 3609027000 HC COLONOSCOPY: Performed by: INTERNAL MEDICINE

## 2024-04-24 PROCEDURE — 2709999900 HC NON-CHARGEABLE SUPPLY: Performed by: INTERNAL MEDICINE

## 2024-04-24 PROCEDURE — C9113 INJ PANTOPRAZOLE SODIUM, VIA: HCPCS | Performed by: INTERNAL MEDICINE

## 2024-04-24 PROCEDURE — 7100000010 HC PHASE II RECOVERY - FIRST 15 MIN: Performed by: INTERNAL MEDICINE

## 2024-04-24 PROCEDURE — 3700000001 HC ADD 15 MINUTES (ANESTHESIA): Performed by: INTERNAL MEDICINE

## 2024-04-24 PROCEDURE — 30233N1 TRANSFUSION OF NONAUTOLOGOUS RED BLOOD CELLS INTO PERIPHERAL VEIN, PERCUTANEOUS APPROACH: ICD-10-PCS | Performed by: INTERNAL MEDICINE

## 2024-04-24 PROCEDURE — 6370000000 HC RX 637 (ALT 250 FOR IP): Performed by: INTERNAL MEDICINE

## 2024-04-24 PROCEDURE — 36415 COLL VENOUS BLD VENIPUNCTURE: CPT

## 2024-04-24 PROCEDURE — 6360000002 HC RX W HCPCS: Performed by: NURSE ANESTHETIST, CERTIFIED REGISTERED

## 2024-04-24 PROCEDURE — 3700000000 HC ANESTHESIA ATTENDED CARE: Performed by: INTERNAL MEDICINE

## 2024-04-24 PROCEDURE — 36430 TRANSFUSION BLD/BLD COMPNT: CPT

## 2024-04-24 PROCEDURE — 1210000000 HC MED SURG R&B

## 2024-04-24 PROCEDURE — 2580000003 HC RX 258: Performed by: INTERNAL MEDICINE

## 2024-04-24 PROCEDURE — 80069 RENAL FUNCTION PANEL: CPT

## 2024-04-24 PROCEDURE — 83735 ASSAY OF MAGNESIUM: CPT

## 2024-04-24 PROCEDURE — 6360000002 HC RX W HCPCS: Performed by: INTERNAL MEDICINE

## 2024-04-24 PROCEDURE — 85018 HEMOGLOBIN: CPT

## 2024-04-24 PROCEDURE — 85014 HEMATOCRIT: CPT

## 2024-04-24 PROCEDURE — 85025 COMPLETE CBC W/AUTO DIFF WBC: CPT

## 2024-04-24 PROCEDURE — 0DJD8ZZ INSPECTION OF LOWER INTESTINAL TRACT, VIA NATURAL OR ARTIFICIAL OPENING ENDOSCOPIC: ICD-10-PCS | Performed by: INTERNAL MEDICINE

## 2024-04-24 PROCEDURE — A4216 STERILE WATER/SALINE, 10 ML: HCPCS | Performed by: INTERNAL MEDICINE

## 2024-04-24 RX ORDER — SODIUM CHLORIDE 9 MG/ML
INJECTION, SOLUTION INTRAVENOUS PRN
Status: CANCELLED | OUTPATIENT
Start: 2024-04-24

## 2024-04-24 RX ORDER — SODIUM CHLORIDE 0.9 % (FLUSH) 0.9 %
5-40 SYRINGE (ML) INJECTION EVERY 12 HOURS SCHEDULED
Status: CANCELLED | OUTPATIENT
Start: 2024-04-24

## 2024-04-24 RX ORDER — PROPOFOL 10 MG/ML
INJECTION, EMULSION INTRAVENOUS PRN
Status: DISCONTINUED | OUTPATIENT
Start: 2024-04-24 | End: 2024-04-24 | Stop reason: SDUPTHER

## 2024-04-24 RX ORDER — SODIUM CHLORIDE 9 MG/ML
INJECTION, SOLUTION INTRAVENOUS
Status: COMPLETED
Start: 2024-04-24 | End: 2024-04-24

## 2024-04-24 RX ORDER — SODIUM CHLORIDE 9 MG/ML
INJECTION, SOLUTION INTRAVENOUS PRN
Status: COMPLETED | OUTPATIENT
Start: 2024-04-24 | End: 2024-04-24

## 2024-04-24 RX ORDER — SODIUM CHLORIDE 0.9 % (FLUSH) 0.9 %
5-40 SYRINGE (ML) INJECTION PRN
Status: DISCONTINUED | OUTPATIENT
Start: 2024-04-24 | End: 2024-04-24 | Stop reason: HOSPADM

## 2024-04-24 RX ORDER — SODIUM CHLORIDE 9 MG/ML
INJECTION, SOLUTION INTRAVENOUS CONTINUOUS
Status: DISCONTINUED | OUTPATIENT
Start: 2024-04-24 | End: 2024-04-24 | Stop reason: HOSPADM

## 2024-04-24 RX ORDER — SODIUM CHLORIDE 9 MG/ML
INJECTION, SOLUTION INTRAVENOUS PRN
Status: DISCONTINUED | OUTPATIENT
Start: 2024-04-24 | End: 2024-04-24 | Stop reason: HOSPADM

## 2024-04-24 RX ORDER — MAGNESIUM HYDROXIDE 1200 MG/15ML
LIQUID ORAL PRN
Status: DISCONTINUED | OUTPATIENT
Start: 2024-04-24 | End: 2024-04-24 | Stop reason: ALTCHOICE

## 2024-04-24 RX ORDER — SIMETHICONE 40MG/0.6ML
SUSPENSION, DROPS(FINAL DOSAGE FORM)(ML) ORAL PRN
Status: DISCONTINUED | OUTPATIENT
Start: 2024-04-24 | End: 2024-04-24 | Stop reason: ALTCHOICE

## 2024-04-24 RX ORDER — SODIUM CHLORIDE 0.9 % (FLUSH) 0.9 %
5-40 SYRINGE (ML) INJECTION EVERY 12 HOURS SCHEDULED
Status: DISCONTINUED | OUTPATIENT
Start: 2024-04-24 | End: 2024-04-24 | Stop reason: HOSPADM

## 2024-04-24 RX ORDER — SODIUM CHLORIDE 0.9 % (FLUSH) 0.9 %
5-40 SYRINGE (ML) INJECTION PRN
Status: CANCELLED | OUTPATIENT
Start: 2024-04-24

## 2024-04-24 RX ORDER — SODIUM CHLORIDE 9 MG/ML
INJECTION, SOLUTION INTRAVENOUS CONTINUOUS
Status: CANCELLED | OUTPATIENT
Start: 2024-04-24

## 2024-04-24 RX ADMIN — SODIUM CHLORIDE: 9 INJECTION, SOLUTION INTRAVENOUS at 12:45

## 2024-04-24 RX ADMIN — PANTOPRAZOLE SODIUM 40 MG: 40 INJECTION, POWDER, FOR SOLUTION INTRAVENOUS at 07:59

## 2024-04-24 RX ADMIN — SODIUM CHLORIDE, POTASSIUM CHLORIDE, SODIUM LACTATE AND CALCIUM CHLORIDE: 600; 310; 30; 20 INJECTION, SOLUTION INTRAVENOUS at 08:01

## 2024-04-24 RX ADMIN — PRAVASTATIN SODIUM 40 MG: 40 TABLET ORAL at 20:00

## 2024-04-24 RX ADMIN — PROPOFOL 50 MG: 10 INJECTION, EMULSION INTRAVENOUS at 13:19

## 2024-04-24 RX ADMIN — PROPOFOL 50 MG: 10 INJECTION, EMULSION INTRAVENOUS at 12:51

## 2024-04-24 RX ADMIN — PROPOFOL 50 MG: 10 INJECTION, EMULSION INTRAVENOUS at 12:54

## 2024-04-24 RX ADMIN — SODIUM CHLORIDE: 9 INJECTION, SOLUTION INTRAVENOUS at 13:18

## 2024-04-24 RX ADMIN — PROPOFOL 50 MG: 10 INJECTION, EMULSION INTRAVENOUS at 13:12

## 2024-04-24 RX ADMIN — Medication 10 ML: at 20:33

## 2024-04-24 RX ADMIN — RANOLAZINE 500 MG: 500 TABLET, FILM COATED, EXTENDED RELEASE ORAL at 20:00

## 2024-04-24 RX ADMIN — CARVEDILOL 25 MG: 25 TABLET, FILM COATED ORAL at 15:22

## 2024-04-24 RX ADMIN — PROPOFOL 50 MG: 10 INJECTION, EMULSION INTRAVENOUS at 12:59

## 2024-04-24 ASSESSMENT — PAIN - FUNCTIONAL ASSESSMENT: PAIN_FUNCTIONAL_ASSESSMENT: 0-10

## 2024-04-24 NOTE — CONSULTS
[facility]                   [address] [place] [address]                              CONSULTATION      PATIENT NAME: RENATA LISA                 : 1932  MED REC NO: [id number]                        ROOM: [id number]  ACCOUNT NO: [id number]                       ADMIT DATE: 2024  PROVIDER: Sarbjit Chapin DO    RENAL CONSULT      HISTORY OF PRESENT ILLNESS:  89-year-old being admitted to the hospital for rectal bleeding.  She recently saw Dr. Haro in the office and mentioned the issue for the past 2-3 days of blood in the stool.  He directed her to the ER, and she was admitted to the hospital.  She has known CKD 4, which has been stable over the past year.  On admission to the hospital, her vital signs were stable with slightly high elevation initially.  The patient's serum creatinine of 1.8 with a GFR of 25 mL/min.  Her hemoglobin was 6.5 g today, and she will receive a unit of blood.  The patient has known coronary stent placements.  She also has a pacemaker in place.  At this time, the patient has no abdominal pain.  No nausea or vomiting.  No weight loss.    PAST MEDICAL HISTORY:  CKD 4, hypertension, hyperlipidemia, hypothyroidism, organic heart disease - coronary artery disease.    PAST SURGICAL HISTORY:  Right breast biopsy, coronary artery stent placements, hysterectomy, pacemaker insertion.    ALLERGIES:  SULFA.      MEDICATIONS:  At time of her admission; Keflex, Norvasc, nitroglycerin, Effexor, Klor-Con, vitamin D, Synthroid, Hygroton, and Ativan.    HABITS:  No smoking.  No alcohol.  No opioids.    REVIEW OF SYSTEMS:  The patient is asymptomatic.    PHYSICAL EXAMINATION:  VITAL SIGNS:  5 feet 4 inches, 121 pounds.  Blood pressure at this time 120/60, on admission 187/80, heart rate 60, respirations 16, afebrile.  HEENT:  Normocephalic.  Pupils are reactive to light.  Extraocular muscles are intact.  NECK:  Supple.  No JVD or adenopathy.   CHEST:  Wall shows

## 2024-04-24 NOTE — ANESTHESIA PRE PROCEDURE
\"HCGQUANT\"     ABGs: No results found for: \"PHART\", \"PO2ART\", \"KXC9BNQ\", \"QDO3WEQ\", \"BEART\", \"Q8TZIRDY\"     Type & Screen (If Applicable):  No results found for: \"LABABO\", \"LABRH\"    Drug/Infectious Status (If Applicable):  No results found for: \"HIV\", \"HEPCAB\"    COVID-19 Screening (If Applicable): No results found for: \"COVID19\"        Anesthesia Evaluation  Patient summary reviewed and Nursing notes reviewed   no history of anesthetic complications:   Airway: Mallampati: II          Dental:          Pulmonary:Negative Pulmonary ROS                              Cardiovascular:    (+) hypertension:, pacemaker:, CAD:, hyperlipidemia                  Neuro/Psych:   Negative Neuro/Psych ROS              GI/Hepatic/Renal:   (+) renal disease:, bowel prep          Endo/Other:    (+) hypothyroidism::..                 Abdominal:             Vascular: negative vascular ROS.         Other Findings:             Anesthesia Plan      MAC     ASA 3       Induction: intravenous.      Anesthetic plan and risks discussed with patient.      Plan discussed with attending.                    Nena Carolina, APRN - CRNA   4/24/2024

## 2024-04-24 NOTE — PROGRESS NOTES
Hospitalist Progress Note      PCP: Sarbjit Chapin DO    Date of Admission: 4/23/2024    Chief Complaint:  no acute events, afebrile, stable HD, plan for colonoscopy today per GI    Medications:  Reviewed    Infusion Medications    sodium chloride      sodium chloride      sodium chloride      lactated ringers IV soln 100 mL/hr at 04/24/24 0801     Scheduled Medications    sodium chloride flush  5-40 mL IntraVENous 2 times per day    sodium chloride flush  5-40 mL IntraVENous 2 times per day    pantoprazole (PROTONIX) 40 mg in sodium chloride (PF) 0.9 % 10 mL injection  40 mg IntraVENous Daily    amLODIPine  5 mg Oral Daily    carvedilol  25 mg Oral BID WC    sertraline  100 mg Oral Daily    ranolazine  500 mg Oral BID    pravastatin  40 mg Oral Daily    lisinopril  20 mg Oral Daily    levothyroxine  50 mcg Oral Daily    chlorthalidone  25 mg Oral Daily     PRN Meds: sodium chloride flush, sodium chloride, sodium chloride flush, sodium chloride, ondansetron **OR** ondansetron, polyethylene glycol, acetaminophen **OR** acetaminophen, labetalol      Intake/Output Summary (Last 24 hours) at 4/24/2024 1259  Last data filed at 4/24/2024 0801  Gross per 24 hour   Intake 50 ml   Output --   Net 50 ml       Exam:    BP (!) 198/81   Pulse 64   Temp 98.6 °F (37 °C)   Resp 18   Ht 1.626 m (5' 4\")   Wt 54.9 kg (121 lb)   SpO2 98%   BMI 20.77 kg/m²     General appearance: alert, cooperative  Lungs: clear to auscultation bilaterally  Heart: S1/S2,RRR  Abdomen: soft, active BS  Extremities: no edema      Labs:   Recent Labs     04/22/24  0030 04/23/24  1415 04/23/24  1742 04/24/24  0012 04/24/24  0535   WBC 7.7 7.2  --   --  5.7   HGB 10.7* 7.7* 8.4* 7.4* 6.5*   HCT 32.0* 23.0* 25.4* 21.4* 19.0*    170  --   --  135     Recent Labs     04/22/24  0045 04/23/24  1451 04/24/24  0535    138 145*   K 3.7 3.9 3.9    102 111*   CO2 24 22 22   BUN 35* 44* 41*   CREATININE 1.84* 2.15* 1.89*   CALCIUM 9.1 8.8

## 2024-04-24 NOTE — PROGRESS NOTES
Patient assessment and vitals complete and per flowsheets. Blood transfusion given. Patient tolerated well and had no noted reactions. Patient down for colonoscopy, aware of plan of care.

## 2024-04-24 NOTE — CONSENT
Informed Consent for Blood Component Transfusion Note    I have discussed with the patient the rationale for blood component transfusion; its benefits in treating or preventing fatigue, organ damage, or death; and its risk which includes mild transfusion reactions, rare risk of blood borne infection, or more serious but rare reactions. I have discussed the alternatives to transfusion, including the risk and consequences of not receiving transfusion. The patient had an opportunity to ask questions and had agreed to proceed with transfusion of blood components.    Electronically signed by HAILE CASE MD on 4/24/24 at 9:40 AM EDT

## 2024-04-24 NOTE — CONSULTS
Renal consult dictated  CKD 4   anemia CIB  OHD   Hypertension     Pln iron levels stable  BP fine presently  will get unit RBC  and GI colonooscpy

## 2024-04-24 NOTE — CARE COORDINATION
Case Management Assessment  Initial Evaluation    Date/Time of Evaluation: 4/24/2024 8:31 AM  Assessment Completed by: Shereen Husain RN    If patient is discharged prior to next notation, then this note serves as note for discharge by case management.    Patient Name: Yael Ervin                   YOB: 1932  Diagnosis: Anemia due to acute blood loss [D62]  GI bleed [K92.2]  Gastrointestinal hemorrhage, unspecified gastrointestinal hemorrhage type [K92.2]                   Date / Time: 4/23/2024  1:33 PM    Patient Admission Status: Inpatient   Readmission Risk (Low < 19, Mod (19-27), High > 27): No data recorded  Current PCP: Sarbjit Chapin, DO  PCP verified by CM? Yes    Chart Reviewed: Yes      History Provided by: Patient  Patient Orientation: Alert and Oriented    Patient Cognition: Alert    Hospitalization in the last 30 days (Readmission):  No    If yes, Readmission Assessment in CM Navigator will be completed.    Advance Directives:      Code Status: Full Code   Patient's Primary Decision Maker is: Legal Next of Kin      Discharge Planning:    Patient lives with: Alone Type of Home: House  Primary Care Giver: Self  Patient Support Systems include: Children   Current Financial resources:    Current community resources:    Current services prior to admission: None            Current DME:              Type of Home Care services:  None    ADLS  Prior functional level: Independent in ADLs/IADLs  Current functional level: Independent in ADLs/IADLs    PT AM-PAC:   /24  OT AM-PAC:   /24    Family can provide assistance at DC: Yes  Would you like Case Management to discuss the discharge plan with any other family members/significant others, and if so, who? Yes (DAUGHTERS)  Plans to Return to Present Housing: Yes  Other Identified Issues/Barriers to RETURNING to current housing: NA  Potential Assistance needed at discharge: N/A            Potential DME:    Patient expects to discharge to:

## 2024-04-24 NOTE — ACP (ADVANCE CARE PLANNING)
Advance Care Planning   Healthcare Decision Maker:  SOFÍA [phone]  SON[place] [phone]    Click here to complete Healthcare Decision Makers including selection of the Healthcare Decision Maker Relationship (ie \"Primary\").

## 2024-04-24 NOTE — CONSULTS
Inpatient consult to GI  Consult performed by: Dillon Rai MD  Consult ordered by: Bridget Sparks, DO      Patient Name: Yael Ervin  Admit Date: 2024  1:33 PM  MR #: [id number]  : 1932    Attending Physician: Tia Purdy MD    History of Presenting Illness:      Yael Ervin is a 89 y.o. female on hospital day 1, admitted with rectal bleeding. Patient with  has a past medical history of Anxiety, CAD (coronary artery disease), Depression, Heart disease, Hyperlipidemia, Hypertension, and Hypothyroidism.    Reason for GI consult: rectal bleeding    History Obtained From:  patient, electronic medical record    Patient has been having dark red bloody stools for the last 3 days.  Patient reports 2 to 3 times daily.  She was seen in ER  and discharged home.  Patient returned to ER from Dr. Haro's office with continued bloody stools.  She was noted to have a 3 gram drop in hemoglobin.  She received 1 unit of PRBC today with hemoglobin of 6.5.  She denies any abdominal pain, nausea nor vomiting.  She is on Plavix.  Patient denies any previous history of GI bleeding.  Patient was given Movi prep last night, she tolerated well.     Previous endoscopic evaluation:  Patient reports over 10 years for last colonoscopy    History:      Past Medical History:   Diagnosis Date    Anxiety     CAD (coronary artery disease)     Depression     Heart disease     Hyperlipidemia     Hypertension     Hypothyroidism      Past Surgical History:   Procedure Laterality Date    BREAST CYST ASPIRATION Right 14    U/S guided core bx of the right breast    CORONARY ANGIOPLASTY WITH STENT PLACEMENT      DIAGNOSTIC CARDIAC CATH LAB PROCEDURE      HYSTERECTOMY (CERVIX STATUS UNKNOWN)      PACEMAKER INSERTION       Family History  History reviewed. No pertinent family history.  [] Unable to obtain due to ventilated and/ or neurologic status  Social History     Socioeconomic History    Marital status:      Spouse name:

## 2024-04-24 NOTE — PLAN OF CARE
Problem: Discharge Planning  Goal: Discharge to home or other facility with appropriate resources  Outcome: Progressing     Problem: Safety - Adult  Goal: Free from fall injury  Outcome: Progressing     Problem: ABCDS Injury Assessment  Goal: Absence of physical injury  Outcome: Progressing     Problem: Risk for Elopement  Goal: Patient will not exit the unit/facility without proper excort  Outcome: Progressing

## 2024-04-24 NOTE — ANESTHESIA POSTPROCEDURE EVALUATION
Department of Anesthesiology  Postprocedure Note    Patient: Yael Ervin  MRN: [id number]  YOB: 1932  Date of evaluation: 4/24/2024    Procedure Summary       Date: 04/24/24 Room / Location: [facility] OR 03 / [facility]    Anesthesia Start: 1247 Anesthesia Stop: 1323    Procedure: COLONOSCOPY DIAGNOSTIC Diagnosis:       Rectal bleeding      (Rectal bleeding [K62.5])    Surgeons: Dillon Rai MD Responsible Provider: Nena Carolina APRN - CRNA    Anesthesia Type: MAC ASA Status: 3            Anesthesia Type: No value filed.    Yvette Phase I: Yvette Score: 10    Yvette Phase II:      Anesthesia Post Evaluation    Patient location during evaluation: PACU  Patient participation: complete - patient cannot participate  Level of consciousness: awake  Pain score: 0  Airway patency: patent  Nausea & Vomiting: no vomiting and no nausea  Cardiovascular status: blood pressure returned to baseline  Respiratory status: acceptable  Hydration status: stable  Pain management: adequate        No notable events documented.

## 2024-04-25 VITALS
TEMPERATURE: 97.9 F | HEIGHT: 64 IN | HEART RATE: 60 BPM | DIASTOLIC BLOOD PRESSURE: 64 MMHG | WEIGHT: 120.1 LBS | OXYGEN SATURATION: 100 % | BODY MASS INDEX: 20.51 KG/M2 | SYSTOLIC BLOOD PRESSURE: 136 MMHG | RESPIRATION RATE: 17 BRPM

## 2024-04-25 LAB
ALBUMIN SERPL-MCNC: 3.4 G/DL (ref 3.5–4.6)
ANION GAP SERPL CALCULATED.3IONS-SCNC: 11 MEQ/L (ref 9–15)
BASOPHILS # BLD: 0 K/UL (ref 0–0.2)
BASOPHILS NFR BLD: 0.6 %
BUN SERPL-MCNC: 29 MG/DL (ref 8–23)
CALCIUM SERPL-MCNC: 8.4 MG/DL (ref 8.5–9.9)
CHLORIDE SERPL-SCNC: 108 MEQ/L (ref 95–107)
CO2 SERPL-SCNC: 24 MEQ/L (ref 20–31)
CREAT SERPL-MCNC: 1.88 MG/DL (ref 0.5–0.9)
EKG ATRIAL RATE: 77 BPM
EKG ATRIAL RATE: 81 BPM
EKG P AXIS: 53 DEGREES
EKG P AXIS: 59 DEGREES
EKG P-R INTERVAL: 198 MS
EKG P-R INTERVAL: 200 MS
EKG Q-T INTERVAL: 442 MS
EKG Q-T INTERVAL: 446 MS
EKG QRS DURATION: 164 MS
EKG QRS DURATION: 166 MS
EKG QTC CALCULATION (BAZETT): 504 MS
EKG QTC CALCULATION (BAZETT): 513 MS
EKG R AXIS: -47 DEGREES
EKG R AXIS: -50 DEGREES
EKG T AXIS: 121 DEGREES
EKG T AXIS: 128 DEGREES
EKG VENTRICULAR RATE: 77 BPM
EKG VENTRICULAR RATE: 81 BPM
EOSINOPHIL # BLD: 0.1 K/UL (ref 0–0.7)
EOSINOPHIL NFR BLD: 1.7 %
ERYTHROCYTE [DISTWIDTH] IN BLOOD BY AUTOMATED COUNT: 16 % (ref 11.5–14.5)
GLUCOSE SERPL-MCNC: 99 MG/DL (ref 70–99)
HCT VFR BLD AUTO: 24.7 % (ref 37–47)
HCT VFR BLD AUTO: 27.1 % (ref 37–47)
HGB BLD-MCNC: 8.3 G/DL (ref 12–16)
HGB BLD-MCNC: 9.3 G/DL (ref 12–16)
LYMPHOCYTES # BLD: 1.8 K/UL (ref 1–4.8)
LYMPHOCYTES NFR BLD: 27.7 %
MAGNESIUM SERPL-MCNC: 2.2 MG/DL (ref 1.7–2.4)
MCH RBC QN AUTO: 30.2 PG (ref 27–31.3)
MCHC RBC AUTO-ENTMCNC: 34.3 % (ref 33–37)
MCV RBC AUTO: 88 FL (ref 79.4–94.8)
MONOCYTES # BLD: 0.6 K/UL (ref 0.2–0.8)
MONOCYTES NFR BLD: 9.6 %
NEUTROPHILS # BLD: 3.9 K/UL (ref 1.4–6.5)
NEUTS SEG NFR BLD: 60.1 %
PHOSPHATE SERPL-MCNC: 3.1 MG/DL (ref 2.3–4.8)
PLATELET # BLD AUTO: 136 K/UL (ref 130–400)
POTASSIUM SERPL-SCNC: 4.1 MEQ/L (ref 3.4–4.9)
RBC # BLD AUTO: 3.08 M/UL (ref 4.2–5.4)
SODIUM SERPL-SCNC: 143 MEQ/L (ref 135–144)
WBC # BLD AUTO: 6.5 K/UL (ref 4.8–10.8)

## 2024-04-25 PROCEDURE — 85018 HEMOGLOBIN: CPT

## 2024-04-25 PROCEDURE — 2580000003 HC RX 258: Performed by: INTERNAL MEDICINE

## 2024-04-25 PROCEDURE — 83735 ASSAY OF MAGNESIUM: CPT

## 2024-04-25 PROCEDURE — 85025 COMPLETE CBC W/AUTO DIFF WBC: CPT

## 2024-04-25 PROCEDURE — 80069 RENAL FUNCTION PANEL: CPT

## 2024-04-25 PROCEDURE — C9113 INJ PANTOPRAZOLE SODIUM, VIA: HCPCS | Performed by: INTERNAL MEDICINE

## 2024-04-25 PROCEDURE — 6370000000 HC RX 637 (ALT 250 FOR IP): Performed by: INTERNAL MEDICINE

## 2024-04-25 PROCEDURE — A4216 STERILE WATER/SALINE, 10 ML: HCPCS | Performed by: INTERNAL MEDICINE

## 2024-04-25 PROCEDURE — 6360000002 HC RX W HCPCS: Performed by: INTERNAL MEDICINE

## 2024-04-25 PROCEDURE — 85014 HEMATOCRIT: CPT

## 2024-04-25 PROCEDURE — 36415 COLL VENOUS BLD VENIPUNCTURE: CPT

## 2024-04-25 RX ADMIN — PANTOPRAZOLE SODIUM 40 MG: 40 INJECTION, POWDER, FOR SOLUTION INTRAVENOUS at 08:26

## 2024-04-25 RX ADMIN — CARVEDILOL 25 MG: 25 TABLET, FILM COATED ORAL at 08:26

## 2024-04-25 RX ADMIN — Medication 10 ML: at 08:26

## 2024-04-25 RX ADMIN — LISINOPRIL 20 MG: 20 TABLET ORAL at 08:26

## 2024-04-25 RX ADMIN — CHLORTHALIDONE 25 MG: 25 TABLET ORAL at 08:26

## 2024-04-25 RX ADMIN — SERTRALINE 100 MG: 100 TABLET, FILM COATED ORAL at 08:26

## 2024-04-25 RX ADMIN — AMLODIPINE BESYLATE 5 MG: 5 TABLET ORAL at 08:26

## 2024-04-25 RX ADMIN — RANOLAZINE 500 MG: 500 TABLET, FILM COATED, EXTENDED RELEASE ORAL at 08:26

## 2024-04-25 RX ADMIN — LEVOTHYROXINE SODIUM 50 MCG: 0.05 TABLET ORAL at 05:21

## 2024-04-25 ASSESSMENT — PAIN SCALES - GENERAL: PAINLEVEL_OUTOF10: 0

## 2024-04-25 NOTE — PROGRESS NOTES
Hospitalist Progress Note      PCP: Sarbjit Chapin DO    Date of Admission: 4/23/2024    Chief Complaint:  no acute events, afebrile, stable HD, s/p colonoscopy yesterday per GI    Medications:  Reviewed    Infusion Medications       Scheduled Medications    sodium chloride flush  5-40 mL IntraVENous 2 times per day    pantoprazole (PROTONIX) 40 mg in sodium chloride (PF) 0.9 % 10 mL injection  40 mg IntraVENous Daily    amLODIPine  5 mg Oral Daily    carvedilol  25 mg Oral BID WC    sertraline  100 mg Oral Daily    ranolazine  500 mg Oral BID    pravastatin  40 mg Oral Daily    lisinopril  20 mg Oral Daily    levothyroxine  50 mcg Oral Daily    chlorthalidone  25 mg Oral Daily     PRN Meds: sodium chloride flush, ondansetron **OR** ondansetron, polyethylene glycol, acetaminophen **OR** acetaminophen, labetalol      Intake/Output Summary (Last 24 hours) at 4/25/2024 1225  Last data filed at 4/25/2024 1106  Gross per 24 hour   Intake 4466.81 ml   Output 0 ml   Net 4466.81 ml         Exam:    BP (!) 152/78   Pulse 60   Temp 97.9 °F (36.6 °C) (Oral)   Resp 16   Ht 1.626 m (5' 4\")   Wt 54.5 kg (120 lb 1.6 oz)   SpO2 100%   BMI 20.62 kg/m²     General appearance: alert, cooperative  Lungs: clear to auscultation bilaterally  Heart: S1/S2,RRR  Abdomen: soft, active BS  Extremities: no edema      Labs:   Recent Labs     04/23/24  1415 04/23/24  1742 04/24/24  0535 04/24/24  1703 04/25/24  0000 04/25/24  0529   WBC 7.2  --  5.7  --   --  6.5   HGB 7.7*   < > 6.5* 9.2* 8.3* 9.3*   HCT 23.0*   < > 19.0* 27.2* 24.7* 27.1*     --  135  --   --  136    < > = values in this interval not displayed.       Recent Labs     04/23/24  1451 04/24/24  0535 04/25/24  0529    145* 143   K 3.9 3.9 4.1    111* 108*   CO2 22 22 24   BUN 44* 41* 29*   CREATININE 2.15* 1.89* 1.88*   CALCIUM 8.8 8.4* 8.4*   PHOS  --  3.8 3.1       Recent Labs     04/23/24  1451   AST 24   ALT 11   BILITOT 0.3   ALKPHOS 54       Recent

## 2024-04-25 NOTE — PROGRESS NOTES
Patient assessment and vitals complete and per flowsheet. No needs, no signs of bleeding noted and pt hopeful for discharge. Ok from Dr. Chapin and Meaghan NP to discharge patient, Dr. Purdy aware. No needs at this time.

## 2024-04-25 NOTE — PROGRESS NOTES
Nephrology Progress Note    Assessment:  CKD-4  Hypothyroid  Anemia stable colon-negative  OHDx SSS pacemaker         Plan: ok to discharge  H&H 1 week    Patient Active Problem List:     Depression     Anxiety     Hypothyroidism     Heart disease     Osteoarthritis of shoulder     Syncope and collapse     Status post placement of other cardiac pacemaker     Sick sinus syndrome (HCC)     History of coronary artery stent placement     Essential hypertension     GRAHAM (dyspnea on exertion)     Pacemaker     History of PTCA 1     Coronary artery disease involving native coronary artery of native heart with angina pectoris ([facility])     Dyslipidemia     Chronic renal disease, stage III ([facility]) [473800]     GI bleed      Subjective:  Admit Date: 4/23/2024    Interval History: eating feels great    Medications:  Scheduled Meds:   sodium chloride flush  5-40 mL IntraVENous 2 times per day    pantoprazole (PROTONIX) 40 mg in sodium chloride (PF) 0.9 % 10 mL injection  40 mg IntraVENous Daily    amLODIPine  5 mg Oral Daily    carvedilol  25 mg Oral BID WC    sertraline  100 mg Oral Daily    ranolazine  500 mg Oral BID    pravastatin  40 mg Oral Daily    lisinopril  20 mg Oral Daily    levothyroxine  50 mcg Oral Daily    chlorthalidone  25 mg Oral Daily     Continuous Infusions:    CBC:   Recent Labs     04/24/24  0535 04/24/24  1703 04/25/24  0000 04/25/24  0529   WBC 5.7  --   --  6.5   HGB 6.5*   < > 8.3* 9.3*     --   --  136    < > = values in this interval not displayed.     CMP:    Recent Labs     04/23/24  1451 04/24/24  0535 04/25/24  0529    145* 143   K 3.9 3.9 4.1    111* 108*   CO2 22 22 24   BUN 44* 41* 29*   CREATININE 2.15* 1.89* 1.88*   GLUCOSE 108* 112* 99   CALCIUM 8.8 8.4* 8.4*   LABGLOM 21.1* 24.7* 24.8*     Troponin: No results for input(s): \"TROPONINI\" in the last 72 hours.  BNP: No results for input(s): \"BNP\" in the last 72 hours.  INR:   Recent Labs     04/23/24  1415   INR 1.1     Lipids:

## 2024-04-25 NOTE — CARE COORDINATION
CM entered room to discuss dc planning.  Pt is agreeable to going home.  Pt denies home going needs.  Pt has 3 dtrs that are her caregivers and they lives close by.  Pt exercises at [facility] x3 weekly.  Will continue to monitor.

## 2024-04-25 NOTE — PLAN OF CARE
Problem: Discharge Planning  Goal: Discharge to home or other facility with appropriate resources  Outcome: Progressing     Problem: Safety - Adult  Goal: Free from fall injury  Outcome: Progressing     Problem: ABCDS Injury Assessment  Goal: Absence of physical injury  Outcome: Progressing     Problem: Risk for Elopement  Goal: Patient will not exit the unit/facility without proper excort  Outcome: Progressing     Problem: Skin/Tissue Integrity  Goal: Absence of new skin breakdown  Description: 1.  Monitor for areas of redness and/or skin breakdown  2.  Assess vascular access sites hourly  3.  Every 4-6 hours minimum:  Change oxygen saturation probe site  4.  Every 4-6 hours:  If on nasal continuous positive airway pressure, respiratory therapy assess nares and determine need for appliance change or resting period.  Outcome: Progressing     Problem: Pain  Goal: Verbalizes/displays adequate comfort level or baseline comfort level  Outcome: Progressing

## 2024-04-25 NOTE — DISCHARGE SUMMARY
March 25, 2019      Don Samson  [address]  [facility] [address]        To Whom It May Concern,      Patient was seen in office for acute symptoms today.  He can return to work tomorrow, 3/26/2019 with no restrictions.      Sincerely,        Alysia Gomez PA-C          
tablet  Commonly known as: NORVASC  What changed: Another medication with the same name was removed. Continue taking this medication, and follow the directions you see here.     chlorthalidone 25 MG tablet  Commonly known as: HYGROTON  What changed: Another medication with the same name was removed. Continue taking this medication, and follow the directions you see here.     lisinopril 20 MG tablet  Commonly known as: PRINIVIL;ZESTRIL  What changed: Another medication with the same name was removed. Continue taking this medication, and follow the directions you see here.     POTASSIUM BICARBONATE PO  What changed: Another medication with the same name was removed. Continue taking this medication, and follow the directions you see here.     pravastatin 40 MG tablet  Commonly known as: PRAVACHOL  What changed: Another medication with the same name was removed. Continue taking this medication, and follow the directions you see here.            CONTINUE taking these medications      acetaminophen 500 MG tablet  Commonly known as: TYLENOL     carvedilol 25 MG tablet  Commonly known as: COREG     levothyroxine 50 MCG tablet  Commonly known as: SYNTHROID  Take 1 tablet by mouth Daily     methylPREDNISolone 4 MG tablet  Commonly known as: MEDROL     nitroGLYCERIN 0.4 MG SL tablet  Commonly known as: Nitrostat  place 1 tablet under the tongue if needed every 5 minutes for chest pain for 3 doses IF NO RELIEF AFTER 3RD DOSE CALL PRESCRIBER .     OCUVITE EXTRA PO     ranolazine 500 MG extended release tablet  Commonly known as: RANEXA     sertraline 100 MG tablet  Commonly known as: ZOLOFT     Vitamin D3 50 MCG (2000 UT) Caps capsule  Generic drug: vitamin D            STOP taking these medications      cephALEXin 500 MG capsule  Commonly known as: KEFLEX     clopidogrel 75 MG tablet  Commonly known as: PLAVIX     LORazepam 1 MG tablet  Commonly known as: ATIVAN     venlafaxine 25 MG tablet  Commonly known as: EFFEXOR

## 2024-04-25 NOTE — PROGRESS NOTES
Shift assessment complete. Patient A&Ox4. Medications were given per MAR. She denies of any pain or further requests at this time. Call light within reach.    Electronically signed by Sherry Pike RN on 4/25/2024 at 12:42 AM

## 2024-04-25 NOTE — PROGRESS NOTES
Discharge instructions provided to patient and daughter. Verbalized understanding of follow up appointments, diet, wound care, medications and reasons to return to ED/call physician. All questions answered. Copy of discharge instructions provided. Assisted into wheelchair and taken to personal car. Denies further needs.       IV removed without complication. Pt tolerated well. Catheter intact, dressing applied. No drainage noted.         Patient and daughter verbalized understanding that plavix is to be held for 2 weeks.

## 2024-04-25 NOTE — PROGRESS NOTES
Gastroenterology Progress Note    Yael Ervin is a 89 y.o. female patient.  Hospitalization Day:2    Chief C/O: rectal bleeding    SUBJECTIVE: Patient tolerating regular diet without difficulty.  Patient s/p colonoscopy with no evidence of active or fresh bleeding.  Patient denies any bowel nausea, vomiting nor abdominal pain.     Previous Endoscopic Evaluation:   Colonoscopy 2024 Selene Ramos  Sharp angulation at the rectosigmoid junction, unable to advance the pediatric colonoscopy.  The scope was withdrawn and replaced with a upper  endoscope.  It was advanced past the rectosigmoid area with some difficulty.Multiple large fecaliths noted in the sigmoid colon and the rectum.  The scope could not be advanced past the hepatic flexure due to the short   length of the upper endoscope.No evidence for active bleeding, stigmata of recent bleed or fresh blood within the colon. Diverticulosis in the recto-sigmoid colon and in the sigmoid colon. 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 The examination was otherwise normal. No specimens collected.    ROS:  Gastrointestinal ROS: no abdominal pain, change in bowel habits, or black or bloody stools    Physical    VITALS:  BP (!) 152/78   Pulse 60   Temp 97.9 °F (36.6 °C) (Oral)   Resp 16   Ht 1.626 m (5' 4\")   Wt 54.5 kg (120 lb 1.6 oz)   SpO2 100%   BMI 20.62 kg/m²   TEMPERATURE:  Current - Temp: 97.9 °F (36.6 °C); Max - Temp  Av.1 °F (36.7 °C)  Min: 97.9 °F (36.6 °C)  Max: 98.6 °F (37 °C)    General - No acute distress  Eyes - no Icterus, no pallor  Cardiovascular - RRR, no murmur  Lungs - Clear to auscultation bilaterally  Abdomen - non-distended,  non-tender, no organomegaly, Bowel sounds normal  Extremities - no edema  Skin - warm and dry  Neuro: no asterixis     Data    Data Review:    Recent Labs     24  1415 24  1742 24  0535 24  1703 24  0000 24  0529   WBC 7.2  --  5.7  --   --  6.5   HGB 7.7*   < > 6.5* 9.2* 8.3*

## 2024-04-26 ENCOUNTER — CARE COORDINATION (OUTPATIENT)
Dept: CARE COORDINATION | Age: 89
End: 2024-04-26

## 2024-04-26 NOTE — CARE COORDINATION
Care Transitions Note    Initial Call - Call within 2 business days of discharge: Yes     Patient Current Location:  Home: [address] [place] [id number]    Care Transition Nurse contacted the patient by telephone to perform post hospital discharge assessment, verified name and  as identifiers. Provided introduction to self, and explanation of the Care Transition Nurse role.     Patient: Yael Ervin    Patient : 1932   MRN: <[id number]>    Reason for Admission: GIB, s/p 1 unit PRBC, colonoscopy   Discharge Date: 24  RURS: Readmission Risk Score: 19.1      Last Discharge Facility       Date Complaint Diagnosis Description Type Department Provider    24 Rectal Bleeding Gastrointestinal hemorrhage, unspecified gastrointestinal hemorrhage type ... ED to Hosp-Admission (Discharged) (ADMITTED) Tia Bella MD; Jorge Sparks...        Care Summary Note:     Spoke with Yael for Non [facility] PCP care transition call post hospital discharge. She reports feeling \"just fine\" today and reports she just finished washing up. Yael stated her daughters live next door and one stayed with her last night.   She reports that she has been eating and drinking without concern since discharge. She had a BM today that was \"normal\".   She stopped the Plavix as ordered.   Yael reports being very active and plans to do some things around her home today and hopes to plant flowers this weekend.  She declined assistance with scheduling her HFU appointments.   Yael denies any needs, questions, or concerns at this time.   Will sign off as she follows with a [facility] PCP.      Care Transition Nurse reviewed discharge instructions, medical action plan, and red flags with patient. The patient was given an opportunity to ask questions; no further questions or concerns at this time.  Were discharge instructions available to patient? Yes.   Reviewed appropriate site of care based on symptoms and resources

## 2024-05-07 ENCOUNTER — APPOINTMENT (OUTPATIENT)
Dept: CT IMAGING | Age: 89
End: 2024-05-07
Payer: OTHER MISCELLANEOUS

## 2024-05-07 ENCOUNTER — APPOINTMENT (OUTPATIENT)
Dept: GENERAL RADIOLOGY | Age: 89
End: 2024-05-07
Payer: OTHER MISCELLANEOUS

## 2024-05-07 ENCOUNTER — HOSPITAL ENCOUNTER (EMERGENCY)
Age: 89
Discharge: HOME | End: 2024-05-07
Attending: EMERGENCY MEDICINE
Payer: OTHER MISCELLANEOUS

## 2024-05-07 VITALS
RESPIRATION RATE: 19 BRPM | HEART RATE: 60 BPM | WEIGHT: 116 LBS | SYSTOLIC BLOOD PRESSURE: 177 MMHG | TEMPERATURE: 98.4 F | BODY MASS INDEX: 19.81 KG/M2 | HEIGHT: 64 IN | DIASTOLIC BLOOD PRESSURE: 84 MMHG | OXYGEN SATURATION: 100 %

## 2024-05-07 DIAGNOSIS — I10 HYPERTENSION, UNSPECIFIED TYPE: ICD-10-CM
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
## 2024-05-07 DIAGNOSIS — S09.90XA CLOSED HEAD INJURY, INITIAL ENCOUNTER: ICD-10-CM

## 2024-05-07 DIAGNOSIS — N18.9 CHRONIC KIDNEY DISEASE, UNSPECIFIED CKD STAGE: Primary | ICD-10-CM

## 2024-05-07 LAB
ABO + RH BLD: NORMAL
ALBUMIN SERPL-MCNC: 3.8 G/DL (ref 3.5–4.6)
ALP SERPL-CCNC: 61 U/L (ref 40–130)
ALT SERPL-CCNC: 13 U/L (ref 0–33)
ANION GAP SERPL CALCULATED.3IONS-SCNC: 12 MEQ/L (ref 9–15)
APTT PPP: 20.4 SEC (ref 24.4–36.8)
AST SERPL-CCNC: 27 U/L (ref 0–35)
BILIRUB SERPL-MCNC: 0.4 MG/DL (ref 0.2–0.7)
BLD GP AB SCN SERPL QL: NORMAL
BUN SERPL-MCNC: 28 MG/DL (ref 8–23)
CALCIUM SERPL-MCNC: 8.9 MG/DL (ref 8.5–9.9)
CHLORIDE SERPL-SCNC: 99 MEQ/L (ref 95–107)
CO2 SERPL-SCNC: 25 MEQ/L (ref 20–31)
CREAT SERPL-MCNC: 1.72 MG/DL (ref 0.5–0.9)
ERYTHROCYTE [DISTWIDTH] IN BLOOD BY AUTOMATED COUNT: 17.2 % (ref 11.5–14.5)
ETHANOL PERCENT: NORMAL G/DL
ETHANOLAMINE SERPL-MCNC: <10 MG/DL (ref 0–0.08)
GLOBULIN SER CALC-MCNC: 3.2 G/DL (ref 2.3–3.5)
GLUCOSE SERPL-MCNC: 112 MG/DL (ref 70–99)
HCT VFR BLD AUTO: 28.5 % (ref 37–47)
HGB BLD-MCNC: 9.5 G/DL (ref 12–16)
INR PPP: 1
LACTATE BLDV-SCNC: 1.1 MMOL/L (ref 0.5–2.2)
MCH RBC QN AUTO: 30.7 PG (ref 27–31.3)
MCHC RBC AUTO-ENTMCNC: 33.3 % (ref 33–37)
MCV RBC AUTO: 92.2 FL (ref 79.4–94.8)
PERFORMED ON: ABNORMAL
PLATELET # BLD AUTO: 199 K/UL (ref 130–400)
POC CREATININE WHOLE BLOOD: 1.9
POC CREATININE: 1.9 MG/DL (ref 0.6–1.2)
POC SAMPLE TYPE: ABNORMAL
POTASSIUM SERPL-SCNC: 4 MEQ/L (ref 3.4–4.9)
PROT SERPL-MCNC: 7 G/DL (ref 6.3–8)
PROTHROMBIN TIME: 13.3 SEC (ref 12.3–14.9)
RBC # BLD AUTO: 3.09 M/UL (ref 4.2–5.4)
REASON FOR REJECTION: NORMAL
REJECTED TEST: NORMAL
SODIUM SERPL-SCNC: 136 MEQ/L (ref 135–144)
WBC # BLD AUTO: 6.7 K/UL (ref 4.8–10.8)

## 2024-05-07 PROCEDURE — 72125 CT NECK SPINE W/O DYE: CPT

## 2024-05-07 PROCEDURE — 93005 ELECTROCARDIOGRAM TRACING: CPT | Performed by: PHYSICIAN ASSISTANT

## 2024-05-07 PROCEDURE — 73130 X-RAY EXAM OF HAND: CPT

## 2024-05-07 PROCEDURE — 6830039000 HC L3 TRAUMA ALERT

## 2024-05-07 PROCEDURE — 72128 CT CHEST SPINE W/O DYE: CPT

## 2024-05-07 PROCEDURE — 71260 CT THORAX DX C+: CPT

## 2024-05-07 PROCEDURE — 73030 X-RAY EXAM OF SHOULDER: CPT

## 2024-05-07 PROCEDURE — 83605 ASSAY OF LACTIC ACID: CPT

## 2024-05-07 PROCEDURE — 72131 CT LUMBAR SPINE W/O DYE: CPT

## 2024-05-07 PROCEDURE — 86900 BLOOD TYPING SEROLOGIC ABO: CPT

## 2024-05-07 PROCEDURE — 74177 CT ABD & PELVIS W/CONTRAST: CPT

## 2024-05-07 PROCEDURE — 86901 BLOOD TYPING SEROLOGIC RH(D): CPT

## 2024-05-07 PROCEDURE — 86850 RBC ANTIBODY SCREEN: CPT

## 2024-05-07 PROCEDURE — 99285 EMERGENCY DEPT VISIT HI MDM: CPT

## 2024-05-07 PROCEDURE — 85730 THROMBOPLASTIN TIME PARTIAL: CPT

## 2024-05-07 PROCEDURE — 82077 ASSAY SPEC XCP UR&BREATH IA: CPT

## 2024-05-07 PROCEDURE — 85610 PROTHROMBIN TIME: CPT

## 2024-05-07 PROCEDURE — 6360000004 HC RX CONTRAST MEDICATION: Performed by: EMERGENCY MEDICINE

## 2024-05-07 PROCEDURE — 2580000003 HC RX 258: Performed by: EMERGENCY MEDICINE

## 2024-05-07 PROCEDURE — 73560 X-RAY EXAM OF KNEE 1 OR 2: CPT

## 2024-05-07 PROCEDURE — 36415 COLL VENOUS BLD VENIPUNCTURE: CPT

## 2024-05-07 PROCEDURE — 85027 COMPLETE CBC AUTOMATED: CPT

## 2024-05-07 PROCEDURE — 73110 X-RAY EXAM OF WRIST: CPT

## 2024-05-07 PROCEDURE — 70450 CT HEAD/BRAIN W/O DYE: CPT

## 2024-05-07 PROCEDURE — 80053 COMPREHEN METABOLIC PANEL: CPT

## 2024-05-07 RX ORDER — 0.9 % SODIUM CHLORIDE 0.9 %
500 INTRAVENOUS SOLUTION INTRAVENOUS ONCE
Status: COMPLETED | OUTPATIENT
Start: 2024-05-07 | End: 2024-05-07

## 2024-05-07 RX ORDER — LIDOCAINE 50 MG/G
1 PATCH TOPICAL DAILY
Qty: 30 PATCH | Refills: 0 | Status: SHIPPED | OUTPATIENT
Start: 2024-05-07

## 2024-05-07 RX ADMIN — SODIUM CHLORIDE 500 ML: 9 INJECTION, SOLUTION INTRAVENOUS at 12:36

## 2024-05-07 RX ADMIN — IOPAMIDOL 75 ML: 755 INJECTION, SOLUTION INTRAVENOUS at 13:30

## 2024-05-07 ASSESSMENT — PAIN DESCRIPTION - LOCATION: LOCATION: HEAD

## 2024-05-07 ASSESSMENT — PAIN SCALES - GENERAL: PAINLEVEL_OUTOF10: 6

## 2024-05-07 ASSESSMENT — PAIN DESCRIPTION - DESCRIPTORS: DESCRIPTORS: DISCOMFORT

## 2024-05-07 ASSESSMENT — PAIN DESCRIPTION - ORIENTATION: ORIENTATION: MID;UPPER

## 2024-05-07 ASSESSMENT — PAIN - FUNCTIONAL ASSESSMENT: PAIN_FUNCTIONAL_ASSESSMENT: 0-10

## 2024-05-07 ASSESSMENT — ENCOUNTER SYMPTOMS
SHORTNESS OF BREATH: 0
VOMITING: 0

## 2024-05-07 NOTE — FLOWSHEET NOTE
Dr Mack made aware of Creat 1.72 and GFR 27.6 level - override per Dr Mack to continue with contrast for CT exams
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
## 2024-05-07 NOTE — DISCHARGE INSTRUCTIONS
Return for worsening symptoms.  Over-the-counter Tylenol.  Follow-up with your doctor.  Thank you.

## 2024-05-08 LAB
EKG ATRIAL RATE: 61 BPM
EKG P AXIS: 60 DEGREES
EKG P-R INTERVAL: 204 MS
EKG Q-T INTERVAL: 450 MS
EKG QRS DURATION: 166 MS
EKG QTC CALCULATION (BAZETT): 453 MS
EKG R AXIS: -47 DEGREES
EKG T AXIS: 118 DEGREES
EKG VENTRICULAR RATE: 61 BPM

## 2024-09-16 ENCOUNTER — OFFICE VISIT (OUTPATIENT)
Dept: CARDIOLOGY CLINIC | Age: 89
End: 2024-09-16
Payer: MEDICARE

## 2024-09-16 ENCOUNTER — OFFICE VISIT (OUTPATIENT)
Dept: FAMILY MEDICINE CLINIC | Age: 89
End: 2024-09-16

## 2024-09-16 ENCOUNTER — HOSPITAL ENCOUNTER (OUTPATIENT)
Dept: CARDIOLOGY | Age: 89
Discharge: HOME | End: 2024-09-16
Payer: MEDICARE

## 2024-09-16 VITALS — OXYGEN SATURATION: 95 % | HEART RATE: 79 BPM | SYSTOLIC BLOOD PRESSURE: 100 MMHG | DIASTOLIC BLOOD PRESSURE: 62 MMHG

## 2024-09-16 VITALS
BODY MASS INDEX: 19.5 KG/M2 | TEMPERATURE: 102 F | HEART RATE: 83 BPM | HEIGHT: 64 IN | OXYGEN SATURATION: 94 % | DIASTOLIC BLOOD PRESSURE: 72 MMHG | SYSTOLIC BLOOD PRESSURE: 124 MMHG | RESPIRATION RATE: 16 BRPM | WEIGHT: 114.2 LBS

## 2024-09-16 DIAGNOSIS — Z95.0 PACEMAKER: ICD-10-CM

## 2024-09-16 DIAGNOSIS — J34.89 NASAL DRAINAGE: ICD-10-CM

## 2024-09-16 DIAGNOSIS — I49.5 SICK SINUS SYNDROME (HCC): ICD-10-CM

## 2024-09-16 DIAGNOSIS — Z95.0 PACEMAKER: Primary | ICD-10-CM

## 2024-09-16 DIAGNOSIS — R50.9 FEVER, UNSPECIFIED FEVER CAUSE: ICD-10-CM

## 2024-09-16 DIAGNOSIS — R05.1 ACUTE COUGH: ICD-10-CM

## 2024-09-16 DIAGNOSIS — I25.119 CORONARY ARTERY DISEASE INVOLVING NATIVE CORONARY ARTERY OF NATIVE HEART WITH ANGINA PECTORIS (HCC): Primary | ICD-10-CM

## 2024-09-16 DIAGNOSIS — R53.83 FATIGUE, UNSPECIFIED TYPE: ICD-10-CM

## 2024-09-16 DIAGNOSIS — U07.1 COVID-19: Primary | ICD-10-CM

## 2024-09-16 DIAGNOSIS — E78.5 DYSLIPIDEMIA: ICD-10-CM

## 2024-09-16 DIAGNOSIS — I10 ESSENTIAL HYPERTENSION: ICD-10-CM

## 2024-09-16 LAB
INFLUENZA A ANTIBODY: NORMAL
INFLUENZA B ANTIBODY: NORMAL
Lab: ABNORMAL
PERFORMING INSTRUMENT: ABNORMAL
QC PASS/FAIL: ABNORMAL
SARS-COV-2, POC: DETECTED

## 2024-09-16 PROCEDURE — 99214 OFFICE O/P EST MOD 30 MIN: CPT | Performed by: INTERNAL MEDICINE

## 2024-09-16 PROCEDURE — 1123F ACP DISCUSS/DSCN MKR DOCD: CPT | Performed by: INTERNAL MEDICINE

## 2024-09-16 PROCEDURE — 93280 PM DEVICE PROGR EVAL DUAL: CPT

## 2024-09-16 RX ORDER — FUROSEMIDE 20 MG
20 TABLET ORAL
COMMUNITY
Start: 2024-07-22

## 2024-09-16 SDOH — ECONOMIC STABILITY: FOOD INSECURITY: WITHIN THE PAST 12 MONTHS, THE FOOD YOU BOUGHT JUST DIDN'T LAST AND YOU DIDN'T HAVE MONEY TO GET MORE.: NEVER TRUE

## 2024-09-16 SDOH — ECONOMIC STABILITY: INCOME INSECURITY: HOW HARD IS IT FOR YOU TO PAY FOR THE VERY BASICS LIKE FOOD, HOUSING, MEDICAL CARE, AND HEATING?: NOT HARD AT ALL

## 2024-09-16 SDOH — ECONOMIC STABILITY: FOOD INSECURITY: WITHIN THE PAST 12 MONTHS, YOU WORRIED THAT YOUR FOOD WOULD RUN OUT BEFORE YOU GOT MONEY TO BUY MORE.: NEVER TRUE

## 2024-09-16 ASSESSMENT — PATIENT HEALTH QUESTIONNAIRE - PHQ9
2. FEELING DOWN, DEPRESSED OR HOPELESS: SEVERAL DAYS
SUM OF ALL RESPONSES TO PHQ QUESTIONS 1-9: 2
9. THOUGHTS THAT YOU WOULD BE BETTER OFF DEAD, OR OF HURTING YOURSELF: NOT AT ALL
1. LITTLE INTEREST OR PLEASURE IN DOING THINGS: NOT AT ALL
6. FEELING BAD ABOUT YOURSELF - OR THAT YOU ARE A FAILURE OR HAVE LET YOURSELF OR YOUR FAMILY DOWN: NOT AT ALL
10. IF YOU CHECKED OFF ANY PROBLEMS, HOW DIFFICULT HAVE THESE PROBLEMS MADE IT FOR YOU TO DO YOUR WORK, TAKE CARE OF THINGS AT HOME, OR GET ALONG WITH OTHER PEOPLE: NOT DIFFICULT AT ALL
4. FEELING TIRED OR HAVING LITTLE ENERGY: NOT AT ALL
7. TROUBLE CONCENTRATING ON THINGS, SUCH AS READING THE NEWSPAPER OR WATCHING TELEVISION: NOT AT ALL
3. TROUBLE FALLING OR STAYING ASLEEP: SEVERAL DAYS
SUM OF ALL RESPONSES TO PHQ QUESTIONS 1-9: 2
SUM OF ALL RESPONSES TO PHQ9 QUESTIONS 1 & 2: 1
SUM OF ALL RESPONSES TO PHQ QUESTIONS 1-9: 2
5. POOR APPETITE OR OVEREATING: NOT AT ALL
SUM OF ALL RESPONSES TO PHQ QUESTIONS 1-9: 2
8. MOVING OR SPEAKING SO SLOWLY THAT OTHER PEOPLE COULD HAVE NOTICED. OR THE OPPOSITE, BEING SO FIGETY OR RESTLESS THAT YOU HAVE BEEN MOVING AROUND A LOT MORE THAN USUAL: NOT AT ALL

## 2024-09-16 ASSESSMENT — ENCOUNTER SYMPTOMS
CONSTIPATION: 1
DIARRHEA: 0
NAUSEA: 0
RHINORRHEA: 1
NAUSEA: 0
COUGH: 1
STRIDOR: 0
ABDOMINAL PAIN: 0
SHORTNESS OF BREATH: 0
WHEEZING: 0
CHEST TIGHTNESS: 0
COUGH: 0
EYES NEGATIVE: 1
SORE THROAT: 0
VOMITING: 0
BLOOD IN STOOL: 0
RESPIRATORY NEGATIVE: 1

## 2025-01-03 ENCOUNTER — OFFICE VISIT (OUTPATIENT)
Dept: CARDIOLOGY CLINIC | Age: 89
End: 2025-01-03
Payer: MEDICARE

## 2025-01-03 VITALS
WEIGHT: 118 LBS | SYSTOLIC BLOOD PRESSURE: 102 MMHG | DIASTOLIC BLOOD PRESSURE: 60 MMHG | BODY MASS INDEX: 20.25 KG/M2 | OXYGEN SATURATION: 98 % | HEART RATE: 82 BPM

## 2025-01-03 DIAGNOSIS — I25.119 CORONARY ARTERY DISEASE INVOLVING NATIVE CORONARY ARTERY OF NATIVE HEART WITH ANGINA PECTORIS (HCC): Primary | ICD-10-CM

## 2025-01-03 PROCEDURE — 93000 ELECTROCARDIOGRAM COMPLETE: CPT | Performed by: INTERNAL MEDICINE

## 2025-01-03 PROCEDURE — 99214 OFFICE O/P EST MOD 30 MIN: CPT | Performed by: INTERNAL MEDICINE

## 2025-01-03 PROCEDURE — 1123F ACP DISCUSS/DSCN MKR DOCD: CPT | Performed by: INTERNAL MEDICINE

## 2025-01-03 PROCEDURE — 1159F MED LIST DOCD IN RCRD: CPT | Performed by: INTERNAL MEDICINE

## 2025-01-03 RX ORDER — NITROGLYCERIN 0.4 MG/1
TABLET SUBLINGUAL
Qty: 25 TABLET | Refills: 11 | Status: SHIPPED | OUTPATIENT
Start: 2025-01-03

## 2025-01-03 RX ORDER — CLOPIDOGREL BISULFATE 75 MG/1
75 TABLET ORAL DAILY
Qty: 30 TABLET | Refills: 3
Start: 2025-01-03

## 2025-01-03 ASSESSMENT — ENCOUNTER SYMPTOMS
RESPIRATORY NEGATIVE: 1
CONSTIPATION: 1
NAUSEA: 0
EYES NEGATIVE: 1
WHEEZING: 0
STRIDOR: 0
CHEST TIGHTNESS: 0
COUGH: 0
SHORTNESS OF BREATH: 0
BLOOD IN STOOL: 0

## 2025-01-03 NOTE — PROGRESS NOTES
Subsequent Progress Note    Patient: Yael Ervin  YOB: 1932  MRN: [id number]    Chief Complaint: cad palp htn   Chief Complaint   Patient presents with    Congestive Heart Failure     4 month follow up       CV Data:  CAD PCI 2008 8/2017 echo 60-65  10/10/2018 CATH: Prior LAD stent mild to mod ISR, CX- mid 70-80 RCA diffuse-   CX CARLOS placed.  PAULINE dissected and also required stent.  10/018 CUS mild  PPM  3/2019 PVR negative   9/21 CUs mild/mod  9/21 spect - negative  9/21 Echo EF 55 1+ MR RVSP 60 mmHg  9/22 CUS mild mod  10/23 CUS mild       Subjective/HPI: constipated and taking laxatives. No cp no jaramillo occ dizzy no recent falls no bleed    1/10/2020 feels well no co no sob eats well no falls no bleed.      8/19/2020 no cp no sob no falls no bleed takes meds eats well walks     11/13/2020 no cp no sob no falls no bleed. No plap eats well.     3/8/21 no cp no sob no falls no bleed takes meds eats well tries to be active    8/17/21 NOW HAS LEFT SIDE SHOULDER AND ARM PAIN. Took NTG SL and seemed to have helped. Causes sob.  Fell yesterday for the first time and lost consciousness. No bleed takes meds.     9/23/21 no further cp still having some shoulder discomfort both side. liekly related to remote fall 4 years ago.     3/10/22 doing well no cp no sob no falls no bleed. Eating well.  Had some adb issues few months ago but resolved.     8/25/22 doing well no cp no  sob no falls no bleed still active    12/16/22 doing well no cp no sob no falls no bleed takes meds. No falls     4/19/23 doing very well gained wt. No cp no sob no bleed feels well.  Upset she did not past her driving test.     8/22/23 doing well no cp no sob no falls no bleed    12/22/23 doing well no cp no sob no falls no bleed eats well. Takes meds.     4/23/24 NO CP NO SOB NO falls.  Had rectal bleed ( Maroon and black Stool). They did not stop Plavix and she is still on it. She had BRB last PM. She did not eat to day as she did not want to

## 2025-03-31 ENCOUNTER — HOSPITAL ENCOUNTER (OUTPATIENT)
Dept: CARDIOLOGY | Age: 89
Discharge: HOME | End: 2025-03-31
Payer: MEDICARE

## 2025-03-31 PROCEDURE — 93280 PM DEVICE PROGR EVAL DUAL: CPT

## 2025-06-03 ENCOUNTER — OFFICE VISIT (OUTPATIENT)
Age: 89
End: 2025-06-03
Payer: MEDICARE

## 2025-06-03 VITALS
RESPIRATION RATE: 15 BRPM | DIASTOLIC BLOOD PRESSURE: 68 MMHG | WEIGHT: 126.2 LBS | SYSTOLIC BLOOD PRESSURE: 128 MMHG | HEART RATE: 69 BPM | BODY MASS INDEX: 21.66 KG/M2 | OXYGEN SATURATION: 99 %

## 2025-06-03 DIAGNOSIS — I34.0 NONRHEUMATIC MITRAL VALVE REGURGITATION: Primary | ICD-10-CM

## 2025-06-03 PROCEDURE — 1123F ACP DISCUSS/DSCN MKR DOCD: CPT | Performed by: INTERNAL MEDICINE

## 2025-06-03 PROCEDURE — 1126F AMNT PAIN NOTED NONE PRSNT: CPT | Performed by: INTERNAL MEDICINE

## 2025-06-03 PROCEDURE — 99214 OFFICE O/P EST MOD 30 MIN: CPT | Performed by: INTERNAL MEDICINE

## 2025-06-03 PROCEDURE — 1159F MED LIST DOCD IN RCRD: CPT | Performed by: INTERNAL MEDICINE

## 2025-06-03 ASSESSMENT — ENCOUNTER SYMPTOMS
CONSTIPATION: 1
WHEEZING: 0
COUGH: 0
STRIDOR: 0
RESPIRATORY NEGATIVE: 1
BLOOD IN STOOL: 0
NAUSEA: 0
CHEST TIGHTNESS: 0
EYES NEGATIVE: 1
SHORTNESS OF BREATH: 0

## 2025-06-03 NOTE — PROGRESS NOTES
day as she did not want to have anymore blood.     9/16/24 she is coughing some. Here w daughter she is tried. Denies CP nor SOB. She is tired.     1/3/25 doing well no cp no sob maria isabel falls no bleed no edema. Eats well.     6/3/25 doing fine still active does not drive. No cp no sob has 4 daughters that help out.     EKG: A Paced 60    Lives alone. Daughter lives next door.       Past Medical History:   Diagnosis Date    Anxiety     CAD (coronary artery disease)     Depression     Heart disease     Hyperlipidemia     Hypertension     Hypothyroidism        Past Surgical History:   Procedure Laterality Date    BREAST CYST ASPIRATION Right 1/17/14    U/S guided core bx of the right breast    COLONOSCOPY N/A 4/24/2024    COLONOSCOPY DIAGNOSTIC performed by Dillon Rai MD at [facility]    CORONARY ANGIOPLASTY WITH STENT PLACEMENT      DIAGNOSTIC CARDIAC CATH LAB PROCEDURE      HYSTERECTOMY (CERVIX STATUS UNKNOWN)      PACEMAKER INSERTION         No family history on file.    Social History     Socioeconomic History    Marital status:    Tobacco Use    Smoking status: Never    Smokeless tobacco: Never   Substance and Sexual Activity    Alcohol use: No    Drug use: No    Sexual activity: Never     Social Drivers of Health     Financial Resource Strain: Low Risk  (9/16/2024)    Overall Financial Resource Strain (CARDIA)     Difficulty of Paying Living Expenses: Not hard at all   Food Insecurity: No Food Insecurity (9/16/2024)    Hunger Vital Sign     Worried About Running Out of Food in the Last Year: Never true     Ran Out of Food in the Last Year: Never true   Transportation Needs: Unknown (9/16/2024)    PRAPARE - Transportation     Lack of Transportation (Non-Medical): No   Housing Stability: Unknown (9/16/2024)    Housing Stability Vital Sign     Homeless in the Last Year: No       Allergies   Allergen Reactions    Other/Food      Nectarines    Sulfa Antibiotics Rash       Current Outpatient Medications

## 2025-07-08 ENCOUNTER — HOSPITAL ENCOUNTER (OUTPATIENT)
Dept: CARDIOLOGY | Age: 89
Discharge: HOME | End: 2025-07-08
Payer: MEDICARE

## 2025-07-08 DIAGNOSIS — Z95.0 PACEMAKER: Primary | ICD-10-CM

## 2025-07-08 DIAGNOSIS — I49.5 SICK SINUS SYNDROME (HCC): ICD-10-CM

## 2025-07-08 PROCEDURE — 93296 REM INTERROG EVL PM/IDS: CPT

## 2025-07-28 ENCOUNTER — HOSPITAL ENCOUNTER (OUTPATIENT)
Age: 89
Discharge: HOME | End: 2025-07-30
Attending: INTERNAL MEDICINE
Payer: MEDICARE

## 2025-07-28 DIAGNOSIS — I34.0 NONRHEUMATIC MITRAL VALVE REGURGITATION: ICD-10-CM

## 2025-07-28 LAB
ECHO AO ROOT DIAM: 3 CM
ECHO AV AREA PEAK VELOCITY: 2 CM2
ECHO AV AREA VTI: 2.1 CM2
ECHO AV MEAN GRADIENT: 3 MMHG
ECHO AV MEAN VELOCITY: 0.8 M/S
ECHO AV PEAK GRADIENT: 6 MMHG
ECHO AV PEAK VELOCITY: 1.3 M/S
ECHO AV VELOCITY RATIO: 0.62
ECHO AV VTI: 32 CM
ECHO EST RA PRESSURE: 3 MMHG
ECHO LA DIAMETER: 3.4 CM
ECHO LA TO AORTIC ROOT RATIO: 1.13
ECHO LA VOL A-L A2C: 37 ML (ref 22–52)
ECHO LA VOL A-L A4C: 69 ML (ref 22–52)
ECHO LA VOL MOD A2C: 36 ML (ref 22–52)
ECHO LA VOL MOD A4C: 66 ML (ref 22–52)
ECHO LA VOLUME AREA LENGTH: 51 ML
ECHO LV E' LATERAL VELOCITY: 5.86 CM/S
ECHO LV E' SEPTAL VELOCITY: 3.75 CM/S
ECHO LV EDV A2C: 56 ML
ECHO LV EDV A4C: 45 ML
ECHO LV EDV BP: 51 ML (ref 56–104)
ECHO LV EJECTION FRACTION 3D: 60 %
ECHO LV EJECTION FRACTION A2C: 66 %
ECHO LV EJECTION FRACTION A4C: 45 %
ECHO LV EJECTION FRACTION BIPLANE: 55 % (ref 55–100)
ECHO LV ESV A2C: 19 ML
ECHO LV ESV A4C: 25 ML
ECHO LV ESV BP: 23 ML (ref 19–49)
ECHO LV FRACTIONAL SHORTENING: 30 % (ref 28–44)
ECHO LV INTERNAL DIMENSION DIASTOLIC: 3 CM (ref 3.9–5.3)
ECHO LV INTERNAL DIMENSION SYSTOLIC: 2.1 CM
ECHO LV IVSD: 1.1 CM (ref 0.6–0.9)
ECHO LV IVSS: 1.2 CM
ECHO LV MASS 2D: 132.2 G (ref 67–162)
ECHO LV POSTERIOR WALL DIASTOLIC: 1.6 CM (ref 0.6–0.9)
ECHO LV POSTERIOR WALL SYSTOLIC: 1.7 CM
ECHO LV RELATIVE WALL THICKNESS RATIO: 1.07
ECHO LVOT AREA: 3.1 CM2
ECHO LVOT AV VTI INDEX: 0.64
ECHO LVOT DIAM: 2 CM
ECHO LVOT MEAN GRADIENT: 1 MMHG
ECHO LVOT PEAK GRADIENT: 2 MMHG
ECHO LVOT PEAK VELOCITY: 0.8 M/S
ECHO LVOT SV: 64.7 ML
ECHO LVOT VTI: 20.6 CM
ECHO MV A VELOCITY: 0.78 M/S
ECHO MV AREA VTI: 2.1 CM2
ECHO MV E DECELERATION TIME (DT): 188 MS
ECHO MV E VELOCITY: 0.53 M/S
ECHO MV E/A RATIO: 0.68
ECHO MV E/E' LATERAL: 9.04
ECHO MV E/E' RATIO (AVERAGED): 11.59
ECHO MV E/E' SEPTAL: 14.13
ECHO MV LVOT VTI INDEX: 1.5
ECHO MV MAX VELOCITY: 1.2 M/S
ECHO MV MEAN GRADIENT: 2 MMHG
ECHO MV MEAN VELOCITY: 0.6 M/S
ECHO MV PEAK GRADIENT: 6 MMHG
ECHO MV REGURGITANT PEAK GRADIENT: 112 MMHG
ECHO MV REGURGITANT PEAK VELOCITY: 5.3 M/S
ECHO MV VTI: 30.9 CM
ECHO PULMONARY ARTERY END DIASTOLIC PRESSURE: 4 MMHG
ECHO PV MAX VELOCITY: 1 M/S
ECHO PV PEAK GRADIENT: 4 MMHG
ECHO PV REGURGITANT MAX VELOCITY: 1 M/S
ECHO RIGHT VENTRICULAR SYSTOLIC PRESSURE (RVSP): 45 MMHG
ECHO RV INTERNAL DIMENSION: 3.1 CM
ECHO RV TAPSE: 2.2 CM (ref 1.7–?)
ECHO TV REGURGITANT MAX VELOCITY: 3.25 M/S
ECHO TV REGURGITANT PEAK GRADIENT: 42 MMHG

## 2025-07-28 PROCEDURE — 93306 TTE W/DOPPLER COMPLETE: CPT | Performed by: INTERNAL MEDICINE

## 2025-07-28 PROCEDURE — 93306 TTE W/DOPPLER COMPLETE: CPT

## (undated) DEVICE — SINGLE PORT MANIFOLD: Brand: NEPTUNE 2

## (undated) DEVICE — BRUSH ENDO CLN L90.5IN SHTH DIA1.7MM BRIST DIA5-7MM 2-6MM

## (undated) DEVICE — TUBING, SUCTION, 1/4" X 10', STRAIGHT: Brand: MEDLINE

## (undated) DEVICE — Device: Brand: ENDO SMARTCAP

## (undated) DEVICE — TUBE SET 96 MM 64 MM H2O PERISTALTIC STD AUX CHANNEL